# Patient Record
Sex: FEMALE | Race: WHITE | NOT HISPANIC OR LATINO | Employment: OTHER | URBAN - METROPOLITAN AREA
[De-identification: names, ages, dates, MRNs, and addresses within clinical notes are randomized per-mention and may not be internally consistent; named-entity substitution may affect disease eponyms.]

---

## 2017-01-10 ENCOUNTER — HOSPITAL ENCOUNTER (OUTPATIENT)
Dept: RADIOLOGY | Facility: HOSPITAL | Age: 57
Discharge: HOME | End: 2017-01-10
Attending: FAMILY MEDICINE
Payer: COMMERCIAL

## 2017-01-10 ENCOUNTER — OFFICE VISIT (OUTPATIENT)
Dept: FAMILY MEDICINE | Facility: CLINIC | Age: 57
End: 2017-01-10
Payer: COMMERCIAL

## 2017-01-10 VITALS
OXYGEN SATURATION: 97 % | HEART RATE: 79 BPM | RESPIRATION RATE: 18 BRPM | WEIGHT: 247.81 LBS | SYSTOLIC BLOOD PRESSURE: 126 MMHG | DIASTOLIC BLOOD PRESSURE: 78 MMHG | BODY MASS INDEX: 35.48 KG/M2 | HEIGHT: 70 IN

## 2017-01-10 DIAGNOSIS — F32.0 MILD MAJOR DEPRESSION: ICD-10-CM

## 2017-01-10 DIAGNOSIS — Z12.39 SCREENING FOR BREAST CANCER: ICD-10-CM

## 2017-01-10 DIAGNOSIS — M15.9 GENERALIZED OSTEOARTHRITIS: ICD-10-CM

## 2017-01-10 DIAGNOSIS — I10 ESSENTIAL HYPERTENSION: Primary | ICD-10-CM

## 2017-01-10 DIAGNOSIS — E03.9 HYPOTHYROIDISM, UNSPECIFIED TYPE: ICD-10-CM

## 2017-01-10 DIAGNOSIS — Z12.31 VISIT FOR SCREENING MAMMOGRAM: ICD-10-CM

## 2017-01-10 PROCEDURE — 99214 OFFICE O/P EST MOD 30 MIN: CPT | Mod: S$GLB,,, | Performed by: FAMILY MEDICINE

## 2017-01-10 PROCEDURE — 3074F SYST BP LT 130 MM HG: CPT | Mod: S$GLB,,, | Performed by: FAMILY MEDICINE

## 2017-01-10 PROCEDURE — 1159F MED LIST DOCD IN RCRD: CPT | Mod: S$GLB,,, | Performed by: FAMILY MEDICINE

## 2017-01-10 PROCEDURE — 99999 PR PBB SHADOW E&M-EST. PATIENT-LVL III: CPT | Mod: PBBFAC,,, | Performed by: FAMILY MEDICINE

## 2017-01-10 PROCEDURE — 77067 SCR MAMMO BI INCL CAD: CPT | Mod: TC

## 2017-01-10 PROCEDURE — 77067 SCR MAMMO BI INCL CAD: CPT | Mod: 26,,, | Performed by: RADIOLOGY

## 2017-01-10 PROCEDURE — 77063 BREAST TOMOSYNTHESIS BI: CPT | Mod: 26,,, | Performed by: RADIOLOGY

## 2017-01-10 PROCEDURE — 3078F DIAST BP <80 MM HG: CPT | Mod: S$GLB,,, | Performed by: FAMILY MEDICINE

## 2017-01-10 RX ORDER — CELECOXIB 100 MG/1
100 CAPSULE ORAL 2 TIMES DAILY PRN
Qty: 180 CAPSULE | Refills: 1 | Status: SHIPPED | OUTPATIENT
Start: 2017-01-10 | End: 2017-10-23

## 2017-01-10 RX ORDER — ESCITALOPRAM OXALATE 10 MG/1
10 TABLET ORAL DAILY
Qty: 90 TABLET | Refills: 1 | Status: SHIPPED | OUTPATIENT
Start: 2017-01-10 | End: 2017-07-05 | Stop reason: SDUPTHER

## 2017-01-10 NOTE — PROGRESS NOTES
Subjective:       Patient ID: Christin Caruso is a 56 y.o. female.    Chief Complaint: Follow-up (6 month f/u)    HPI Comments: Here for f/u chronic medical issues.  Did not get labs over 6 months ago as planned but would like to get updated.  Would like to go back on lexapro as cybalta made her cry.  Feeling better on old lexapro.    Hypertension   This is a chronic problem. The current episode started more than 1 year ago. The problem is controlled. Pertinent negatives include no chest pain, palpitations or shortness of breath. Past treatments include lifestyle changes. The current treatment provides moderate improvement. There are no compliance problems.      Review of Systems   Constitutional: Negative for chills and fever.   Respiratory: Negative for cough, chest tightness and shortness of breath.    Cardiovascular: Negative for chest pain, palpitations and leg swelling.   Gastrointestinal: Negative for abdominal distention and abdominal pain.   Endocrine: Negative for cold intolerance and heat intolerance.   Skin: Negative for rash and wound.   Psychiatric/Behavioral: Negative for dysphoric mood. The patient is not nervous/anxious.        Objective:      Physical Exam   Constitutional: She is oriented to person, place, and time. She appears well-developed and well-nourished.   HENT:   Head: Normocephalic and atraumatic.   Cardiovascular: Normal rate, regular rhythm and normal heart sounds.    Pulmonary/Chest: Effort normal and breath sounds normal.   Abdominal: Soft. Bowel sounds are normal.   Neurological: She is alert and oriented to person, place, and time.   Skin: Skin is warm and dry.   Psychiatric: She has a normal mood and affect.   Nursing note and vitals reviewed.      Assessment:       1. Essential hypertension    2. Hypothyroidism, unspecified type    3. Mild major depression    4. Generalized osteoarthritis        Plan:       Essential hypertension  -     CBC auto differential; Future; Expected date:  1/10/17  -     Comprehensive metabolic panel; Future; Expected date: 1/10/17  -     Lipid panel; Future; Expected date: 1/10/17  -     TSH; Future; Expected date: 1/10/17  -     Vitamin D; Future; Expected date: 1/10/17    Hypothyroidism, unspecified type  -     CBC auto differential; Future; Expected date: 1/10/17  -     Comprehensive metabolic panel; Future; Expected date: 1/10/17  -     Lipid panel; Future; Expected date: 1/10/17  -     TSH; Future; Expected date: 1/10/17  -     Vitamin D; Future; Expected date: 1/10/17    Mild major depression  -     escitalopram oxalate (LEXAPRO) 10 MG tablet; Take 1 tablet (10 mg total) by mouth once daily.  Dispense: 90 tablet; Refill: 1  -     CBC auto differential; Future; Expected date: 1/10/17  -     Comprehensive metabolic panel; Future; Expected date: 1/10/17  -     Lipid panel; Future; Expected date: 1/10/17  -     TSH; Future; Expected date: 1/10/17  -     Vitamin D; Future; Expected date: 1/10/17    Generalized osteoarthritis  -     CBC auto differential; Future; Expected date: 1/10/17  -     Comprehensive metabolic panel; Future; Expected date: 1/10/17  -     Lipid panel; Future; Expected date: 1/10/17  -     TSH; Future; Expected date: 1/10/17  -     Vitamin D; Future; Expected date: 1/10/17    Other orders  -     celecoxib (CELEBREX) 100 MG capsule; Take 1 capsule (100 mg total) by mouth 2 (two) times daily as needed for Pain.  Dispense: 180 capsule; Refill: 1      Home bp log.  Update labs.    Return in about 6 months (around 7/10/2017), or if symptoms worsen or fail to improve.

## 2017-04-13 RX ORDER — PROGESTERONE 200 MG/1
CAPSULE ORAL
Qty: 45 CAPSULE | Refills: 0 | Status: SHIPPED | OUTPATIENT
Start: 2017-04-13 | End: 2018-09-25

## 2017-05-26 RX ORDER — LEVOTHYROXINE SODIUM 137 UG/1
TABLET ORAL
Qty: 90 TABLET | Refills: 0 | Status: SHIPPED | OUTPATIENT
Start: 2017-05-26 | End: 2017-08-26 | Stop reason: SDUPTHER

## 2017-07-05 DIAGNOSIS — F32.0 MILD MAJOR DEPRESSION: ICD-10-CM

## 2017-07-05 RX ORDER — ESCITALOPRAM OXALATE 10 MG/1
10 TABLET ORAL DAILY
Qty: 90 TABLET | Refills: 0 | Status: SHIPPED | OUTPATIENT
Start: 2017-07-05 | End: 2017-10-06 | Stop reason: SDUPTHER

## 2017-08-29 RX ORDER — LEVOTHYROXINE SODIUM 137 UG/1
TABLET ORAL
Qty: 90 TABLET | Refills: 0 | Status: SHIPPED | OUTPATIENT
Start: 2017-08-29 | End: 2017-12-14 | Stop reason: SDUPTHER

## 2017-09-11 ENCOUNTER — TELEPHONE (OUTPATIENT)
Dept: FAMILY MEDICINE | Facility: CLINIC | Age: 57
End: 2017-09-11

## 2017-10-06 DIAGNOSIS — F32.0 MILD MAJOR DEPRESSION: ICD-10-CM

## 2017-10-06 RX ORDER — ESCITALOPRAM OXALATE 10 MG/1
10 TABLET ORAL DAILY
Qty: 90 TABLET | Refills: 0 | Status: SHIPPED | OUTPATIENT
Start: 2017-10-06 | End: 2017-11-17

## 2017-10-06 NOTE — TELEPHONE ENCOUNTER
Refill Authorization Note     is requesting a refill authorization.    Brief assessment and rational for refill: APPROVE: prr  Name of medication: ESCITALOPRAM 10 MG TABLET  How patient will take medication: t1t po daily   Amount/Quantity of medication ordered: 90d   Medication reconciliation completed: No        Refills Authorized: Yes  If authorized number of refills: 0        Medication Therapy Plan: Not commented on recently.  Will approve for 90d   Comments:

## 2017-10-23 ENCOUNTER — TELEPHONE (OUTPATIENT)
Dept: FAMILY MEDICINE | Facility: CLINIC | Age: 57
End: 2017-10-23

## 2017-10-23 ENCOUNTER — OFFICE VISIT (OUTPATIENT)
Dept: FAMILY MEDICINE | Facility: CLINIC | Age: 57
End: 2017-10-23
Payer: COMMERCIAL

## 2017-10-23 VITALS
HEIGHT: 70 IN | TEMPERATURE: 98 F | OXYGEN SATURATION: 97 % | WEIGHT: 249.31 LBS | DIASTOLIC BLOOD PRESSURE: 82 MMHG | SYSTOLIC BLOOD PRESSURE: 126 MMHG | RESPIRATION RATE: 18 BRPM | HEART RATE: 75 BPM | BODY MASS INDEX: 35.69 KG/M2

## 2017-10-23 DIAGNOSIS — M54.50 LUMBAR PAIN WITH RADIATION DOWN RIGHT LEG: ICD-10-CM

## 2017-10-23 DIAGNOSIS — M47.896 OTHER OSTEOARTHRITIS OF SPINE, LUMBAR REGION: Primary | ICD-10-CM

## 2017-10-23 DIAGNOSIS — M19.041 OSTEOARTHRITIS OF FINGER OF RIGHT HAND: ICD-10-CM

## 2017-10-23 DIAGNOSIS — M79.604 LUMBAR PAIN WITH RADIATION DOWN RIGHT LEG: ICD-10-CM

## 2017-10-23 PROBLEM — M19.042 OSTEOARTHRITIS OF FINGERS OF HANDS, BILATERAL: Status: ACTIVE | Noted: 2017-10-23

## 2017-10-23 PROCEDURE — 99999 PR PBB SHADOW E&M-EST. PATIENT-LVL IV: CPT | Mod: PBBFAC,,, | Performed by: NURSE PRACTITIONER

## 2017-10-23 PROCEDURE — 99213 OFFICE O/P EST LOW 20 MIN: CPT | Mod: S$GLB,,, | Performed by: NURSE PRACTITIONER

## 2017-10-23 RX ORDER — CELECOXIB 100 MG/1
100 CAPSULE ORAL 2 TIMES DAILY
Qty: 60 CAPSULE | Refills: 3 | Status: SHIPPED | OUTPATIENT
Start: 2017-10-23 | End: 2017-10-27

## 2017-10-23 RX ORDER — DOXYCYCLINE 100 MG/1
1 CAPSULE ORAL DAILY
COMMUNITY
Start: 2017-10-16 | End: 2017-12-07 | Stop reason: SDUPTHER

## 2017-10-23 NOTE — TELEPHONE ENCOUNTER
----- Message from Shelli Dennis sent at 10/23/2017 10:21 AM CDT -----  Patient stated [facility] on [address] needs prior authorization for medication: Celebrex/also stated they need to know patient has taken medication:Naproxen/please call to authorize and call patient back at [phone] to confirm.

## 2017-10-23 NOTE — PROGRESS NOTES
"Subjective:       Patient ID: Christin Caruso is a 57 y.o. female.    Chief Complaint: Back Pain  She was last seen in primary care by Dr. Betancourt on 01/10/2017. This is her first time seeing me in the clinic.  HPI   She is here today with complaint of chronic back pain greater than 1 year. Low back pain radiates down right leg to her knee. She is taking Naproxen about once weekly and it "use to give relief but no longer does over the last week". Denies history of back injury or fall.  Vitals:    10/23/17 0935   BP: 126/82   Pulse: 75   Resp: 18   Temp: 98.1 °F (36.7 °C)     Lab Results   Component Value Date    TSH 1.066 01/10/2017     CMP  Sodium   Date Value Ref Range Status   01/10/2017 141 136 - 145 mmol/L Final     Potassium   Date Value Ref Range Status   01/10/2017 4.8 3.5 - 5.1 mmol/L Final     Chloride   Date Value Ref Range Status   01/10/2017 104 95 - 110 mmol/L Final     CO2   Date Value Ref Range Status   01/10/2017 30 (H) 23 - 29 mmol/L Final     Glucose   Date Value Ref Range Status   01/10/2017 102 70 - 110 mg/dL Final     BUN, Bld   Date Value Ref Range Status   01/10/2017 18 6 - 20 mg/dL Final     Creatinine   Date Value Ref Range Status   01/10/2017 0.8 0.5 - 1.4 mg/dL Final     Calcium   Date Value Ref Range Status   01/10/2017 9.4 8.7 - 10.5 mg/dL Final     Total Protein   Date Value Ref Range Status   01/10/2017 7.1 6.0 - 8.4 g/dL Final     Albumin   Date Value Ref Range Status   01/10/2017 4.0 3.5 - 5.2 g/dL Final     Total Bilirubin   Date Value Ref Range Status   01/10/2017 1.0 0.1 - 1.0 mg/dL Final     Comment:     For infants and newborns, interpretation of results should be based  on gestational age, weight and in agreement with clinical  observations.  Premature Infant recommended reference ranges:  Up to 24 hours.............<8.0 mg/dL  Up to 48 hours............<12.0 mg/dL  3-5 days..................<15.0 mg/dL  6-29 days.................<15.0 mg/dL       Alkaline Phosphatase   Date " Value Ref Range Status   01/10/2017 85 55 - 135 U/L Final     AST   Date Value Ref Range Status   01/10/2017 26 10 - 40 U/L Final     ALT   Date Value Ref Range Status   01/10/2017 27 10 - 44 U/L Final     Anion Gap   Date Value Ref Range Status   01/10/2017 7 (L) 8 - 16 mmol/L Final     eGFR if    Date Value Ref Range Status   01/10/2017 >60.0 >60 mL/min/1.73 m^2 Final     eGFR if non    Date Value Ref Range Status   01/10/2017 >60.0 >60 mL/min/1.73 m^2 Final     Comment:     Calculation used to obtain the estimated glomerular filtration  rate (eGFR) is the CKD-EPI equation. Since race is unknown   in our information system, the eGFR values for   -American and Non--American patients are given   for each creatinine result.       Review of Systems    Her last lumbar xray was on 1/18/2016 which revealed facet arthropathy.  She states she had physical therapy on her back about 2 years ago with some significant improvement.   She states she has gain 15-20 pounds since that time. Lying down in bed worsens the pain. She has had a rheumatology work up with Dr. LYNNE Brown prior.  Objective:      Physical Exam   Constitutional: She is oriented to person, place, and time. Vital signs are normal. She appears well-developed and well-nourished. She is active and cooperative.   HENT:   Head: Normocephalic and atraumatic.   Right Ear: Hearing, tympanic membrane, external ear and ear canal normal.   Left Ear: Hearing, tympanic membrane, external ear and ear canal normal.   Nose: Nose normal.   Mouth/Throat: Uvula is midline, oropharynx is clear and moist and mucous membranes are normal.   Eyes: Lids are normal.   Neck: Trachea normal, normal range of motion, full passive range of motion without pain and phonation normal. Neck supple.   Cardiovascular: Normal rate, regular rhythm and normal heart sounds.    Pulmonary/Chest: Effort normal and breath sounds normal.   Abdominal: Soft. There  is no splenomegaly or hepatomegaly. There is no tenderness.   Musculoskeletal: She exhibits tenderness and deformity.        Hands:  Lymphadenopathy:        Head (right side): No submental, no submandibular, no tonsillar, no preauricular, no posterior auricular and no occipital adenopathy present.        Head (left side): No submental, no submandibular, no tonsillar, no preauricular, no posterior auricular and no occipital adenopathy present.     She has no cervical adenopathy.   Neurological: She is alert and oriented to person, place, and time.   Skin: Skin is warm, dry and intact.   Psychiatric: She has a normal mood and affect. Her speech is normal and behavior is normal. Judgment and thought content normal. Cognition and memory are normal.   Nursing note and vitals reviewed.      Assessment & Plan:       Other osteoarthritis of spine, lumbar region  -     celecoxib (CELEBREX) 100 MG capsule; Take 1 capsule (100 mg total) by mouth 2 (two) times daily. Take with food  Dispense: 60 capsule; Refill: 3  -     Ambulatory Referral to Physical/Occupational Therapy    Osteoarthritis of finger of right hand  -     celecoxib (CELEBREX) 100 MG capsule; Take 1 capsule (100 mg total) by mouth 2 (two) times daily. Take with food  Dispense: 60 capsule; Refill: 3  -     Ambulatory Referral to Physical/Occupational Therapy    Lumbar pain with radiation down right leg        Will consider further imagine studies as needed if not improving after physical therapy consult  Return in about 4 weeks (around 11/20/2017), or if symptoms worsen or fail to improve.

## 2017-10-24 NOTE — TELEPHONE ENCOUNTER
----- Message from Gregoria Deshpande sent at 10/24/2017  9:58 AM CDT -----  Contact: pt  Pt states sent a message on yesterday,urgent,a prescription was sent to pharmacy needs a prior authorization,needs to be handled today,in severe pain can't walk,needs prescription filled,please. Rx-(CELEBREX)...[phone]         .  [facility]/pharmacy #[id number] - [place] - [address]  [address] [place] [address]  Phone: [phone] Fax: [phone]

## 2017-10-27 ENCOUNTER — TELEPHONE (OUTPATIENT)
Dept: FAMILY MEDICINE | Facility: CLINIC | Age: 57
End: 2017-10-27

## 2017-10-27 RX ORDER — MELOXICAM 7.5 MG/1
7.5 TABLET ORAL DAILY PRN
Qty: 30 TABLET | Refills: 1 | Status: SHIPPED | OUTPATIENT
Start: 2017-10-27 | End: 2018-04-05

## 2017-10-27 NOTE — TELEPHONE ENCOUNTER
Left message notifying patient that Celebrex had been replaced my Meloxicam as Celebrex is not covered by Insurance. Mobic was sent to [facility], and patient should call pharmacy to .

## 2017-10-27 NOTE — TELEPHONE ENCOUNTER
----- Message from Mirna Luong sent at 10/27/2017  9:44 AM CDT -----  Contact: patient  Patient calling in regards to following up on a prescription for Celebrex that she needs to get approved. She is wanting to speak with the Nurse. Please advise.  Call back   Thanks!    [facility]/pharmacy #[id number] - [place] - [address]  [address]  [place] [address]  Phone: [phone] Fax: [phone]

## 2017-11-03 ENCOUNTER — TELEPHONE (OUTPATIENT)
Dept: FAMILY MEDICINE | Facility: CLINIC | Age: 57
End: 2017-11-03

## 2017-11-03 NOTE — TELEPHONE ENCOUNTER
----- Message from Jonathan Solitario sent at 11/1/2017  2:30 PM CDT -----  Contact: same  Patient called in and requested a message be sent regarding a recommendation that was made for patient to go to physical therapy.   Patient stated that the company [employer] [facility] is out of business and will need another referral.  Patient call back number is [phone]

## 2017-11-03 NOTE — TELEPHONE ENCOUNTER
Spoke with pt advised her to contact insurance and find out who they cover and let us know and we will be glad to send referral

## 2017-11-06 ENCOUNTER — TELEPHONE (OUTPATIENT)
Dept: FAMILY MEDICINE | Facility: CLINIC | Age: 57
End: 2017-11-06

## 2017-11-06 DIAGNOSIS — M54.5 LOW BACK PAIN, UNSPECIFIED BACK PAIN LATERALITY, UNSPECIFIED CHRONICITY, WITH SCIATICA PRESENCE UNSPECIFIED: Primary | ICD-10-CM

## 2017-11-06 NOTE — TELEPHONE ENCOUNTER
----- Message from Sofi Duarte sent at 11/6/2017  9:39 AM CST -----  Contact: self  Patient [phone] is calling/the physical therapist - [facility] - is out of business and patient/patient is wanting to go to [facility] - [phone] - for her hands and back/please call patient

## 2017-11-07 ENCOUNTER — TELEPHONE (OUTPATIENT)
Dept: FAMILY MEDICINE | Facility: CLINIC | Age: 57
End: 2017-11-07

## 2017-11-07 NOTE — TELEPHONE ENCOUNTER
----- Message from Tasha Whitman sent at 11/7/2017  7:58 AM CST -----  Contact: self  Patient is calling back regarding her physical therapy. She is upset due to not getting a return call. Patient states the Physical Therapy [facility], [facility] will not make her an appointment until the prescription for her hands and back is faxed to them. Please call [employer] for the fax number at  [phone]. Please call patient at [phone] when prescription is sent. Thanks!

## 2017-11-07 NOTE — TELEPHONE ENCOUNTER
----- Message from RT Yanique sent at 11/7/2017  8:37 AM CST -----  Contact: pt    pt , requesting to provide fax number to [facility] which is , please re fax PT order soon to the number provided, thanks.

## 2017-11-16 ENCOUNTER — TELEPHONE (OUTPATIENT)
Dept: INTERNAL MEDICINE | Facility: CLINIC | Age: 57
End: 2017-11-16

## 2017-11-16 DIAGNOSIS — M62.08 RECTUS DIASTASIS: Primary | ICD-10-CM

## 2017-11-16 NOTE — TELEPHONE ENCOUNTER
Cancelled apt with Dr Hahn for today ,we will not be able to take care of the patient for this she needs to see a specialist for hernia repair and pended referral  Please advise ty

## 2017-11-17 ENCOUNTER — TELEPHONE (OUTPATIENT)
Dept: FAMILY MEDICINE | Facility: CLINIC | Age: 57
End: 2017-11-17

## 2017-11-17 ENCOUNTER — OFFICE VISIT (OUTPATIENT)
Dept: FAMILY MEDICINE | Facility: CLINIC | Age: 57
End: 2017-11-17
Payer: COMMERCIAL

## 2017-11-17 VITALS
DIASTOLIC BLOOD PRESSURE: 86 MMHG | BODY MASS INDEX: 36.23 KG/M2 | SYSTOLIC BLOOD PRESSURE: 128 MMHG | HEIGHT: 70 IN | WEIGHT: 253.06 LBS | RESPIRATION RATE: 16 BRPM | HEART RATE: 88 BPM

## 2017-11-17 DIAGNOSIS — K43.9 VENTRAL HERNIA WITHOUT OBSTRUCTION OR GANGRENE: Primary | ICD-10-CM

## 2017-11-17 PROCEDURE — 99213 OFFICE O/P EST LOW 20 MIN: CPT | Mod: S$GLB,,, | Performed by: FAMILY MEDICINE

## 2017-11-17 PROCEDURE — 99999 PR PBB SHADOW E&M-EST. PATIENT-LVL III: CPT | Mod: PBBFAC,,, | Performed by: FAMILY MEDICINE

## 2017-11-17 NOTE — TELEPHONE ENCOUNTER
----- Message from Gregoria Deshpande sent at 11/17/2017  8:02 AM CST -----  Contact: pt  Pt would like nurse Read to know she is looking for the referral for a Hernia Specialist.....[phone]

## 2017-11-17 NOTE — TELEPHONE ENCOUNTER
----- Message from Jonathan Solitario sent at 11/16/2017  4:21 PM CST -----  Contact: same  Patient called in and wanted to check the status of the specialist name for her hernia surgery.  Patient call back number is [phone]

## 2017-11-17 NOTE — PROGRESS NOTES
Subjective:       Patient ID: Christin Caruso is a 57 y.o. female.    Chief Complaint: Abdominal Pain (Patient here due to abdominal pain. ) and Hernia (Patient here due to abdomina hernia)    Here requesting surgery referral for abd hernia.      Review of Systems   Constitutional: Negative for chills, fatigue and fever.   Respiratory: Negative for cough, chest tightness and shortness of breath.    Cardiovascular: Negative for chest pain, palpitations and leg swelling.   Gastrointestinal: Negative for abdominal distention and abdominal pain.   Endocrine: Negative for cold intolerance and heat intolerance.   Skin: Negative for rash.       Objective:      Physical Exam   Constitutional: She appears well-developed and well-nourished.   HENT:   Head: Normocephalic and atraumatic.   Cardiovascular: Normal rate, regular rhythm and normal heart sounds.    Pulmonary/Chest: Effort normal and breath sounds normal.   Abdominal: Soft. Bowel sounds are normal. She exhibits no distension and no mass. There is no tenderness. There is no rebound and no guarding. A hernia is present.   Psychiatric: She has a normal mood and affect.   Nursing note and vitals reviewed.      Assessment:       1. Ventral hernia without obstruction or gangrene        Plan:       Ventral hernia without obstruction or gangrene  -     Ambulatory referral to General Surgery      Refer to surgery per pt request.  Vs diastasis recti  Return if symptoms worsen or fail to improve.

## 2017-11-17 NOTE — TELEPHONE ENCOUNTER
Left message for patient to call clinic, Please let patient know Dr Betancourt wants her to see Vera on Tuesday and she can do referral if necessary.

## 2017-11-17 NOTE — TELEPHONE ENCOUNTER
----- Message from Evette Fournier sent at 11/17/2017  3:34 PM CST -----  Contact: self  Patient called regarding visit today regarding hernia. What can she do to relieve pain before next appointment? Wanted to know about a girdle she can possibly wear to assist. Please contact [phone]

## 2017-11-17 NOTE — TELEPHONE ENCOUNTER
Spoke with pt and advised her to go to a medical supply store and request an abdominal binder to support her hernia. Pt verbalized understanding

## 2017-11-20 ENCOUNTER — TELEPHONE (OUTPATIENT)
Dept: SURGERY | Facility: CLINIC | Age: 57
End: 2017-11-20

## 2017-11-20 NOTE — TELEPHONE ENCOUNTER
----- Message from Ashlyn Youngkay sent at 11/20/2017  8:16 AM CST -----  Contact: Self  Patient has an hernia and would like to have the surgery before the end of the year because of her insurance.  She has an appointment on 11/30 and is on the wait list.  She was referred by Dr Betancourt.  Thanks

## 2017-11-20 NOTE — TELEPHONE ENCOUNTER
I called patient to inform her that Dr. Castellon is out of town this week.  She has an appointment on Thursday, 11/30/17 at 2:45pm for a ventral hernia.  She wants to have her surgery before the end of the year, which I informed her that she can discuss it with Dr. Castellon at her appointment.  Ross

## 2017-11-30 ENCOUNTER — OFFICE VISIT (OUTPATIENT)
Dept: SURGERY | Facility: CLINIC | Age: 57
End: 2017-11-30
Payer: COMMERCIAL

## 2017-11-30 VITALS
DIASTOLIC BLOOD PRESSURE: 85 MMHG | WEIGHT: 250.19 LBS | SYSTOLIC BLOOD PRESSURE: 135 MMHG | BODY MASS INDEX: 35.82 KG/M2 | HEART RATE: 84 BPM | HEIGHT: 70 IN | TEMPERATURE: 98 F

## 2017-11-30 DIAGNOSIS — R10.9 ABDOMINAL PAIN, UNSPECIFIED ABDOMINAL LOCATION: Primary | ICD-10-CM

## 2017-11-30 PROCEDURE — 99999 PR PBB SHADOW E&M-EST. PATIENT-LVL III: CPT | Mod: PBBFAC,,, | Performed by: SURGERY

## 2017-11-30 PROCEDURE — 99243 OFF/OP CNSLTJ NEW/EST LOW 30: CPT | Mod: S$GLB,,, | Performed by: SURGERY

## 2017-11-30 RX ORDER — CHOLECALCIFEROL (VITAMIN D3) 25 MCG
2000 TABLET ORAL DAILY
COMMUNITY

## 2017-11-30 NOTE — LETTER
November 30, 2017      NINOSKA Betancourt MD  [address] [facility] [address] [place] [address]           [facility]  [address] [facility] [address] [place] [address]  Phone: [phone]          Patient: Christin Caruso   MR Number: [id number]   YOB: 1960   Date of Visit: 11/30/2017       Dear Dr. NINOSKA Betancourt:    Thank you for referring Christin Caruso to me for evaluation. Attached you will find relevant portions of my assessment and plan of care.    If you have questions, please do not hesitate to call me. I look forward to following Christin Caruso along with you.    Sincerely,    Silver Emerson LPN    Enclosure  CC:  No Recipients    If you would like to receive this communication electronically, please contact [email or web address] or [phone] to request more information on [employer] Link access.    For providers and/or their staff who would like to refer a patient to [facility], please contact us through our one-stop-shop provider referral line, [facility], at [phone].    If you feel you have received this communication in error or would no longer like to receive these types of communications, please e-mail [email or web address]

## 2017-11-30 NOTE — PROGRESS NOTES
Subjective:       Patient ID: Christin Caruso is a 57 y.o. female.    Chief Complaint: Consult (Ventral Hernia)    HPI Pleasant 56 yo F referred to me for evaluation of suspected hernia.  Pt notes that she has noticed a bulge in her upper abdomen everytime she leans forward or strains her abdominal wall.  She denies any pain in the area of the bulge.  She has had some occasional R LQ pain.  Pt did have a normal cscope earlier this year.  No n/v.  No feverchills.    Review of Systems   Constitutional: Negative for activity change, appetite change, fever and unexpected weight change.   Respiratory: Negative for chest tightness, shortness of breath and wheezing.    Cardiovascular: Negative for chest pain.   Gastrointestinal: Negative for abdominal distention, abdominal pain, anal bleeding, blood in stool, constipation, diarrhea, nausea, rectal pain and vomiting.   Genitourinary: Negative for difficulty urinating, dysuria and frequency.   Skin: Negative for wound.   Neurological: Negative for dizziness.   Hematological: Negative for adenopathy.   Psychiatric/Behavioral: Negative for agitation.       Objective:      Physical Exam   Constitutional: She is oriented to person, place, and time. She appears well-developed and well-nourished.   HENT:   Head: Normocephalic and atraumatic.   Eyes: Pupils are equal, round, and reactive to light.   Neck: Normal range of motion. Neck supple. No tracheal deviation present. No thyromegaly present.   Cardiovascular: Normal rate, regular rhythm and normal heart sounds.    No murmur heard.  Pulmonary/Chest: Effort normal and breath sounds normal. She exhibits no tenderness.   Abdominal: Soft. Bowel sounds are normal. She exhibits no distension, no abdominal bruit, no pulsatile midline mass and no mass. There is no hepatosplenomegaly. There is no tenderness. There is no rigidity, no rebound, no guarding, no tenderness at McBurney's point and negative Montelongo's sign. No hernia. Hernia  confirmed negative in the ventral area.       Genitourinary: Rectum normal.   Musculoskeletal: Normal range of motion.   Neurological: She is alert and oriented to person, place, and time.   Skin: Skin is warm. No rash noted. No erythema.   Psychiatric: She has a normal mood and affect.   Vitals reviewed.      Assessment:       1. Abdominal pain, unspecified abdominal location        Plan:       D/w pt.  I have informed pt that she has rectus diastasis.  I have informed pt that there is no definite surgery needed for this procedure.  If desired could consider plication with plastics surgery.  Pt expressed understanding and will f/u with me prn

## 2017-12-01 ENCOUNTER — TELEPHONE (OUTPATIENT)
Dept: SURGERY | Facility: CLINIC | Age: 57
End: 2017-12-01

## 2017-12-01 NOTE — TELEPHONE ENCOUNTER
----- Message from Jonna Aranda sent at 12/1/2017  2:17 PM CST -----  Contact: [phone]  Patient is requesting a call back from the nurse, stated she have history of kidney issues, her mother had kidney stones and kidney cancer.  Please call the patient upon request at phone number [phone].

## 2017-12-04 ENCOUNTER — HOSPITAL ENCOUNTER (OUTPATIENT)
Dept: RADIOLOGY | Facility: HOSPITAL | Age: 57
Discharge: HOME | End: 2017-12-04
Attending: SURGERY
Payer: COMMERCIAL

## 2017-12-04 DIAGNOSIS — R10.9 ABDOMINAL PAIN, UNSPECIFIED ABDOMINAL LOCATION: ICD-10-CM

## 2017-12-04 PROCEDURE — 25500020 PHARM REV CODE 255: Mod: PO | Performed by: SURGERY

## 2017-12-04 PROCEDURE — 74178 CT ABD&PLV WO CNTR FLWD CNTR: CPT | Mod: TC,PO

## 2017-12-04 PROCEDURE — 74178 CT ABD&PLV WO CNTR FLWD CNTR: CPT | Mod: 26,,, | Performed by: RADIOLOGY

## 2017-12-04 RX ADMIN — IOHEXOL 30 ML: 350 INJECTION, SOLUTION INTRAVENOUS at 10:12

## 2017-12-04 RX ADMIN — IOHEXOL 100 ML: 350 INJECTION, SOLUTION INTRAVENOUS at 10:12

## 2017-12-05 ENCOUNTER — TELEPHONE (OUTPATIENT)
Dept: SURGERY | Facility: CLINIC | Age: 57
End: 2017-12-05

## 2017-12-05 NOTE — TELEPHONE ENCOUNTER
Called to review results with pt.  Findings of renal stone in distal ureter with mild hydronephrosis.  Recommend f/u with urologist and me prn

## 2017-12-06 ENCOUNTER — TELEPHONE (OUTPATIENT)
Dept: SURGERY | Facility: CLINIC | Age: 57
End: 2017-12-06

## 2017-12-06 NOTE — TELEPHONE ENCOUNTER
Patient informed of her appointment with Dr. Jeffers on Thursday, 12/7/17 at 2:15pm in [place].  Ross

## 2017-12-06 NOTE — TELEPHONE ENCOUNTER
----- Message from Sofi Da Silva sent at 12/6/2017 12:33 PM CST -----  Contact: Christin  Patient is calling to check on referral and name of urologist. Please call [phone]. Thanks!

## 2017-12-07 ENCOUNTER — INITIAL CONSULT (OUTPATIENT)
Dept: UROLOGY | Facility: CLINIC | Age: 57
End: 2017-12-07
Payer: COMMERCIAL

## 2017-12-07 VITALS
HEART RATE: 76 BPM | WEIGHT: 251.31 LBS | BODY MASS INDEX: 35.98 KG/M2 | SYSTOLIC BLOOD PRESSURE: 114 MMHG | HEIGHT: 70 IN | DIASTOLIC BLOOD PRESSURE: 70 MMHG

## 2017-12-07 DIAGNOSIS — N13.30 HYDRONEPHROSIS, UNSPECIFIED HYDRONEPHROSIS TYPE: ICD-10-CM

## 2017-12-07 DIAGNOSIS — R10.9 FLANK PAIN: ICD-10-CM

## 2017-12-07 DIAGNOSIS — N20.1 CALCULUS OF URETER: Primary | ICD-10-CM

## 2017-12-07 DIAGNOSIS — N13.2 HYDRONEPHROSIS WITH OBSTRUCTING CALCULUS: ICD-10-CM

## 2017-12-07 DIAGNOSIS — N20.1 URETERAL STONE: Primary | ICD-10-CM

## 2017-12-07 LAB
BILIRUB SERPL-MCNC: NORMAL MG/DL
BLOOD URINE, POC: NORMAL
COLOR, POC UA: YELLOW
GLUCOSE UR QL STRIP: NORMAL
KETONES UR QL STRIP: NORMAL
LEUKOCYTE ESTERASE URINE, POC: NORMAL
NITRITE, POC UA: NORMAL
PH, POC UA: 5
PROTEIN, POC: NORMAL
SPECIFIC GRAVITY, POC UA: 1
UROBILINOGEN, POC UA: NORMAL

## 2017-12-07 PROCEDURE — 81002 URINALYSIS NONAUTO W/O SCOPE: CPT | Mod: S$GLB,,, | Performed by: UROLOGY

## 2017-12-07 PROCEDURE — 99999 PR PBB SHADOW E&M-EST. PATIENT-LVL III: CPT | Mod: PBBFAC,,, | Performed by: UROLOGY

## 2017-12-07 PROCEDURE — 99204 OFFICE O/P NEW MOD 45 MIN: CPT | Mod: 25,S$GLB,, | Performed by: UROLOGY

## 2017-12-07 RX ORDER — HYDROCODONE BITARTRATE AND ACETAMINOPHEN 5; 325 MG/1; MG/1
1 TABLET ORAL EVERY 6 HOURS PRN
Qty: 20 TABLET | Refills: 0 | Status: SHIPPED | OUTPATIENT
Start: 2017-12-07 | End: 2018-06-26

## 2017-12-07 RX ORDER — DOXYCYCLINE 100 MG/1
100 CAPSULE ORAL DAILY
Qty: 30 CAPSULE | Refills: 0 | Status: SHIPPED | OUTPATIENT
Start: 2017-12-07 | End: 2018-01-16 | Stop reason: SDUPTHER

## 2017-12-07 NOTE — LETTER
December 7, 2017      Emmanuel Castellon MD  [address] [facility] [address] [place] [address]           [facility]  [address] [facility] [place] [address]  Phone: [phone]          Patient: Christin Caruso   MR Number: [id number]   YOB: 1960   Date of Visit: 12/7/2017       Dear Dr. Emmanuel Castellon:    Thank you for referring Christin Caruso to me for evaluation. Attached you will find relevant portions of my assessment and plan of care.    If you have questions, please do not hesitate to call me. I look forward to following Christin Caruso along with you.    Sincerely,    NINOSKA Jeffers MD    Enclosure  CC:  No Recipients    If you would like to receive this communication electronically, please contact [email or web address] or [phone] to request more information on [employer] Link access.    For providers and/or their staff who would like to refer a patient to [facility], please contact us through our one-stop-shop provider referral line, [facility] , at [phone].    If you feel you have received this communication in error or would no longer like to receive these types of communications, please e-mail [email or web address]

## 2017-12-07 NOTE — PROGRESS NOTES
Subjective:       Patient ID: Christin Caruso is a 57 y.o. female.    Chief Complaint: Advice Only    HPI     57 year old with right sided back/flank pain beginning 6-8 weeks ago.  She was being evaluated for ventral hernia and underwent underwent CT scan which demonstrated a small distal right ureteral stone.  She has no history of kidney stone.  I reviewed the images with her.   There is a small 4 mm stone near the right UVJ.  Minimal ureteral dilated an no other stones.  She denies associated nausea.  No gross hematuria and UA is clear today.    Urine dipstick shows negative for all components.    Past Medical History:   Diagnosis Date    Arthritis     Depression     Hypertension     Following with Dr. Craig    MONIKA (obstructive sleep apnea)     Thyroid disease     goiter     Past Surgical History:   Procedure Laterality Date    BUNIONECTOMY      right foot     SECTION      x2    COLONOSCOPY N/A 2016    Procedure: COLONOSCOPY;  Surgeon: Aj Pruitt Jr., MD;  Location: [facility];  Service: Endoscopy;  Laterality: N/A;    TONSILLECTOMY         Current Outpatient Prescriptions:     doxycycline (VIBRAMYCIN) 100 MG Cap, Take 1 capsule (100 mg total) by mouth once daily., Disp: 30 capsule, Rfl: 0    multivitamin (DAILY MULTI-VITAMIN) per tablet, Every day, Disp: , Rfl:     progesterone (PROMETRIUM) 200 MG capsule, TAKE 1 CAPSULE BY MOUTH EVERY FIRST 2 WEEKS OF MONTH., Disp: 45 capsule, Rfl: 0    SYNTHROID 137 mcg Tab tablet, TAKE 1 TABLET BY MOUTH BEFORE BREAKFAST, Disp: 90 tablet, Rfl: 0    vitamin D 1000 units Tab, Take 1,000 Units by mouth once daily., Disp: , Rfl:     epinephrine (EPIPEN) 0.3 mg/0.3 mL (1:1,000) AtIn, Inject 0.3 mLs (0.3 mg total) into the muscle once., Disp: 0.3 mL, Rfl: 3    hydrocodone-acetaminophen 5-325mg (NORCO) 5-325 mg per tablet, Take 1 tablet by mouth every 6 (six) hours as needed for Pain., Disp: 20 tablet, Rfl: 0    meloxicam (MOBIC) 7.5 MG tablet, Take  1 tablet (7.5 mg total) by mouth daily as needed for Pain., Disp: 30 tablet, Rfl: 1    Review of Systems   Constitutional: Negative for fever.   Eyes: Negative for visual disturbance.   Respiratory: Negative for shortness of breath.    Cardiovascular: Negative for chest pain.   Gastrointestinal: Negative for nausea.   Genitourinary: Positive for flank pain. Negative for dysuria and hematuria.   Musculoskeletal: Negative for gait problem.   Skin: Negative for rash.   Neurological: Negative for seizures.   Psychiatric/Behavioral: Negative for confusion.       Objective:      Physical Exam   Constitutional: She is oriented to person, place, and time. She appears well-developed and well-nourished.   HENT:   Head: Normocephalic.   Eyes: Conjunctivae and EOM are normal.   Neck: Normal range of motion.   Cardiovascular: Normal rate.    Pulmonary/Chest: Effort normal.   Abdominal: Soft. She exhibits no distension and no mass. There is no tenderness. There is no CVA tenderness.   Genitourinary:   Genitourinary Comments: Bladder non-tender and nondistended  No CVA tenderness   Musculoskeletal: She exhibits no edema.   Neurological: She is alert and oriented to person, place, and time.   Skin: Skin is warm and dry. No rash noted. No erythema.   Psychiatric: She has a normal mood and affect. Her behavior is normal.   Vitals reviewed.      Assessment:       1. Calculus of ureter    2. Hydronephrosis with obstructing calculus    3. Flank pain        Plan:       Calculus of ureter  -     POCT URINE DIPSTICK WITHOUT MICROSCOPE    Hydronephrosis with obstructing calculus    Flank pain    Other orders  -     doxycycline (VIBRAMYCIN) 100 MG Cap; Take 1 capsule (100 mg total) by mouth once daily.  Dispense: 30 capsule; Refill: 0  -     hydrocodone-acetaminophen 5-325mg (NORCO) 5-325 mg per tablet; Take 1 tablet by mouth every 6 (six) hours as needed for Pain.  Dispense: 20 tablet; Refill: 0      We discussed treatment options.  Longer  trial of passage vs ureteroscopy.  She has elected ureteroscopy.  We discussed procedure including risks.    Recommendations:   Increase hydration 2 liters fluid per day.      Strain Urine     Take pain medications as needed     Call office for severe pain or fever >101

## 2017-12-08 DIAGNOSIS — N20.0 KIDNEY STONE: Primary | ICD-10-CM

## 2017-12-08 DIAGNOSIS — N13.30 HYDRONEPHROSIS, UNSPECIFIED HYDRONEPHROSIS TYPE: ICD-10-CM

## 2017-12-08 NOTE — TELEPHONE ENCOUNTER
----- Message from Tasha Whitman sent at 12/8/2017  2:04 PM CST -----  Contact: self  Patient called asking if she would be a good candidate for Flomax. Patient prefers not to have surgery.  Please call patient at [phone]. Thanks!

## 2017-12-08 NOTE — TELEPHONE ENCOUNTER
Pt would like to know if she can be prescribed flomax to possibly pass this kidney stone on her own. Please advise.

## 2017-12-11 ENCOUNTER — ANESTHESIA EVENT (OUTPATIENT)
Dept: SURGERY | Facility: HOSPITAL | Age: 57
End: 2017-12-11
Payer: COMMERCIAL

## 2017-12-11 ENCOUNTER — PATIENT MESSAGE (OUTPATIENT)
Dept: SURGERY | Facility: HOSPITAL | Age: 57
End: 2017-12-11

## 2017-12-11 ENCOUNTER — TELEPHONE (OUTPATIENT)
Dept: UROLOGY | Facility: CLINIC | Age: 57
End: 2017-12-11

## 2017-12-11 RX ORDER — TAMSULOSIN HYDROCHLORIDE 0.4 MG/1
0.4 CAPSULE ORAL DAILY
Qty: 30 CAPSULE | Refills: 0 | Status: SHIPPED | OUTPATIENT
Start: 2017-12-11 | End: 2018-01-13 | Stop reason: SDUPTHER

## 2017-12-11 NOTE — TELEPHONE ENCOUNTER
----- Message from Malinda Jones sent at 12/11/2017  3:14 PM CST -----  Contact: self [phone]  She wants to know if she can take flomax even though she is having the cysto tomorrow.  Please call her.  Thank you!

## 2017-12-12 ENCOUNTER — HOSPITAL ENCOUNTER (OUTPATIENT)
Dept: RADIOLOGY | Facility: HOSPITAL | Age: 57
Discharge: HOME | End: 2017-12-12
Attending: UROLOGY | Admitting: UROLOGY
Payer: COMMERCIAL

## 2017-12-12 ENCOUNTER — ANESTHESIA (OUTPATIENT)
Dept: SURGERY | Facility: HOSPITAL | Age: 57
End: 2017-12-12
Payer: COMMERCIAL

## 2017-12-12 ENCOUNTER — HOSPITAL ENCOUNTER (OUTPATIENT)
Facility: HOSPITAL | Age: 57
Discharge: HOME | End: 2017-12-12
Attending: UROLOGY | Admitting: UROLOGY
Payer: COMMERCIAL

## 2017-12-12 VITALS
BODY MASS INDEX: 35.07 KG/M2 | TEMPERATURE: 98 F | DIASTOLIC BLOOD PRESSURE: 78 MMHG | HEIGHT: 70 IN | WEIGHT: 245 LBS | SYSTOLIC BLOOD PRESSURE: 148 MMHG | RESPIRATION RATE: 20 BRPM | HEART RATE: 70 BPM

## 2017-12-12 DIAGNOSIS — N20.1 URETERAL STONE: Primary | ICD-10-CM

## 2017-12-12 DIAGNOSIS — N20.1 RIGHT URETERAL STONE: ICD-10-CM

## 2017-12-12 DIAGNOSIS — N20.1 URETERAL STONE: ICD-10-CM

## 2017-12-12 PROCEDURE — 27200651 HC AIRWAY, LMA: Mod: PO | Performed by: NURSE ANESTHETIST, CERTIFIED REGISTERED

## 2017-12-12 PROCEDURE — 76000 FLUOROSCOPY <1 HR PHYS/QHP: CPT | Mod: TC,PO

## 2017-12-12 PROCEDURE — 37000009 HC ANESTHESIA EA ADD 15 MINS: Mod: PO | Performed by: UROLOGY

## 2017-12-12 PROCEDURE — C1758 CATHETER, URETERAL: HCPCS | Mod: PO | Performed by: UROLOGY

## 2017-12-12 PROCEDURE — 71000039 HC RECOVERY, EACH ADD'L HOUR: Mod: PO | Performed by: UROLOGY

## 2017-12-12 PROCEDURE — 36000707: Mod: PO | Performed by: UROLOGY

## 2017-12-12 PROCEDURE — 71000033 HC RECOVERY, INTIAL HOUR: Mod: PO | Performed by: UROLOGY

## 2017-12-12 PROCEDURE — 63600175 PHARM REV CODE 636 W HCPCS: Mod: PO | Performed by: NURSE ANESTHETIST, CERTIFIED REGISTERED

## 2017-12-12 PROCEDURE — 37000008 HC ANESTHESIA 1ST 15 MINUTES: Mod: PO | Performed by: UROLOGY

## 2017-12-12 PROCEDURE — D9220A PRA ANESTHESIA: Mod: ANES,,, | Performed by: ANESTHESIOLOGY

## 2017-12-12 PROCEDURE — D9220A PRA ANESTHESIA: Mod: CRNA,,, | Performed by: NURSE ANESTHETIST, CERTIFIED REGISTERED

## 2017-12-12 PROCEDURE — C1769 GUIDE WIRE: HCPCS | Mod: PO | Performed by: UROLOGY

## 2017-12-12 PROCEDURE — 52351 CYSTOURETERO & OR PYELOSCOPE: CPT | Mod: RT,,, | Performed by: UROLOGY

## 2017-12-12 PROCEDURE — 63600175 PHARM REV CODE 636 W HCPCS: Mod: PO | Performed by: ANESTHESIOLOGY

## 2017-12-12 PROCEDURE — 74420 UROGRAPHY RTRGR +-KUB: CPT | Mod: 26,,, | Performed by: UROLOGY

## 2017-12-12 PROCEDURE — 36000706: Mod: PO | Performed by: UROLOGY

## 2017-12-12 PROCEDURE — 25500020 PHARM REV CODE 255: Mod: PO | Performed by: UROLOGY

## 2017-12-12 RX ORDER — LIDOCAINE HCL/PF 100 MG/5ML
SYRINGE (ML) INTRAVENOUS
Status: DISCONTINUED | OUTPATIENT
Start: 2017-12-12 | End: 2017-12-12

## 2017-12-12 RX ORDER — SODIUM CHLORIDE, SODIUM LACTATE, POTASSIUM CHLORIDE, CALCIUM CHLORIDE 600; 310; 30; 20 MG/100ML; MG/100ML; MG/100ML; MG/100ML
INJECTION, SOLUTION INTRAVENOUS CONTINUOUS
Status: DISCONTINUED | OUTPATIENT
Start: 2017-12-12 | End: 2017-12-12 | Stop reason: HOSPADM

## 2017-12-12 RX ORDER — ONDANSETRON 2 MG/ML
4 INJECTION INTRAMUSCULAR; INTRAVENOUS DAILY PRN
Status: DISCONTINUED | OUTPATIENT
Start: 2017-12-12 | End: 2017-12-12 | Stop reason: HOSPADM

## 2017-12-12 RX ORDER — SODIUM CHLORIDE 9 MG/ML
INJECTION, SOLUTION INTRAVENOUS CONTINUOUS
Status: DISCONTINUED | OUTPATIENT
Start: 2017-12-12 | End: 2017-12-12 | Stop reason: HOSPADM

## 2017-12-12 RX ORDER — LIDOCAINE HYDROCHLORIDE 10 MG/ML
1 INJECTION, SOLUTION EPIDURAL; INFILTRATION; INTRACAUDAL; PERINEURAL ONCE
Status: DISCONTINUED | OUTPATIENT
Start: 2017-12-12 | End: 2017-12-12 | Stop reason: HOSPADM

## 2017-12-12 RX ORDER — FENTANYL CITRATE 50 UG/ML
INJECTION, SOLUTION INTRAMUSCULAR; INTRAVENOUS
Status: DISCONTINUED | OUTPATIENT
Start: 2017-12-12 | End: 2017-12-12

## 2017-12-12 RX ORDER — PROPOFOL 10 MG/ML
VIAL (ML) INTRAVENOUS
Status: DISCONTINUED | OUTPATIENT
Start: 2017-12-12 | End: 2017-12-12

## 2017-12-12 RX ORDER — LEVOFLOXACIN 5 MG/ML
500 INJECTION, SOLUTION INTRAVENOUS
Status: DISCONTINUED | OUTPATIENT
Start: 2017-12-12 | End: 2017-12-12

## 2017-12-12 RX ORDER — OXYCODONE HYDROCHLORIDE 5 MG/1
5 TABLET ORAL
Status: DISCONTINUED | OUTPATIENT
Start: 2017-12-12 | End: 2017-12-12 | Stop reason: HOSPADM

## 2017-12-12 RX ORDER — MIDAZOLAM HYDROCHLORIDE 1 MG/ML
INJECTION, SOLUTION INTRAMUSCULAR; INTRAVENOUS
Status: DISCONTINUED | OUTPATIENT
Start: 2017-12-12 | End: 2017-12-12

## 2017-12-12 RX ADMIN — PROPOFOL 200 MG: 10 INJECTION, EMULSION INTRAVENOUS at 09:12

## 2017-12-12 RX ADMIN — LIDOCAINE HYDROCHLORIDE 100 MG: 20 INJECTION PARENTERAL at 09:12

## 2017-12-12 RX ADMIN — MIDAZOLAM HYDROCHLORIDE 2 MG: 1 INJECTION, SOLUTION INTRAMUSCULAR; INTRAVENOUS at 09:12

## 2017-12-12 RX ADMIN — ONDANSETRON 4 MG: 2 INJECTION, SOLUTION INTRAMUSCULAR; INTRAVENOUS at 09:12

## 2017-12-12 RX ADMIN — FENTANYL CITRATE 50 MCG: 50 INJECTION, SOLUTION INTRAMUSCULAR; INTRAVENOUS at 09:12

## 2017-12-12 RX ADMIN — SODIUM CHLORIDE, SODIUM LACTATE, POTASSIUM CHLORIDE, CALCIUM CHLORIDE: 600; 310; 30; 20 INJECTION, SOLUTION INTRAVENOUS at 08:12

## 2017-12-12 NOTE — ANESTHESIA POSTPROCEDURE EVALUATION
"Anesthesia Post Evaluation    Patient: Christin Caruso    Procedure(s) Performed: Procedure(s) (LRB):  CYSTOSCOPY WITH RETROGRADE PYELOGRAM (Right)  URETEROSCOPY (Right)  CYSTOSCOPY WITH STENT PLACEMENT (Right)    Final Anesthesia Type: general  Patient location during evaluation: PACU  Patient participation: Yes- Able to Participate  Level of consciousness: awake and alert and oriented  Post-procedure vital signs: reviewed and stable  Pain management: adequate  Airway patency: patent  PONV status at discharge: No PONV  Anesthetic complications: no      Cardiovascular status: hemodynamically stable  Respiratory status: unassisted, spontaneous ventilation and room air  Hydration status: euvolemic  Follow-up not needed.        Visit Vitals  BP (!) 148/78 (BP Location: Left arm)   Pulse 70   Temp 36.4 °C (97.5 °F) (Skin)   Resp 20   Ht 5' 10" (1.778 m)   Wt 111.1 kg (245 lb)   BMI 35.15 kg/m²       Pain/Nimisha Score: Pain Assessment Performed: Yes (12/12/2017 10:17 AM)  Presence of Pain: denies (12/12/2017 10:17 AM)  Nimisha Score: 9 (12/12/2017  9:48 AM)      "

## 2017-12-12 NOTE — TRANSFER OF CARE
"Anesthesia Transfer of Care Note    Patient: Christin Caruso    Procedure(s) Performed: Procedure(s) (LRB):  CYSTOSCOPY WITH RETROGRADE PYELOGRAM (Right)  URETEROSCOPY (Right)  CYSTOSCOPY WITH STENT PLACEMENT (Right)    Patient location: PACU    Anesthesia Type: general    Transport from OR: Transported from OR on room air with adequate spontaneous ventilation    Post pain: adequate analgesia    Post assessment: no apparent anesthetic complications    Post vital signs: stable    Level of consciousness: awake, alert and oriented    Nausea/Vomiting: no nausea/vomiting    Complications: none    Transfer of care protocol was followed      Last vitals:   Visit Vitals  /74   Pulse 67   Temp 36.4 °C (97.5 °F) (Skin)   Resp 20   Ht 5' 10" (1.778 m)   Wt 111.1 kg (245 lb)   BMI 35.15 kg/m²     "

## 2017-12-12 NOTE — PROGRESS NOTES
Patient c/o chest pressure 4/10, denies SOB or arm or jaw pain. Anesthesia notified, [facility] to do a 12 lead.

## 2017-12-12 NOTE — PROGRESS NOTES
Dr. MARTINEZ at bedside to read EKG, normal EKG patient denies pain or SOB and got up to bathroom and belched. Patient an family verbalized understanding.

## 2017-12-12 NOTE — ANESTHESIA PREPROCEDURE EVALUATION
12/12/2017  Christin Caruso is a 57 y.o., female.    Pre-op Assessment    I have reviewed the Patient Summary Reports.     I have reviewed the Nursing Notes.   I have reviewed the Medications.     Review of Systems  Anesthesia Hx:  No problems with previous Anesthesia  Denies Family Hx of Anesthesia complications.   Denies Personal Hx of Anesthesia complications.   Social:  Former smoker   Cardiovascular:   Exercise tolerance: good Hypertension Denies MI.  Denies CAD.     Denies Angina.    Pulmonary:   Denies Shortness of breath. Sleep Apnea, CPAP Compliant with CPAP: 13 qpm  No daytime fatigue   Hepatic/GI:  Hepatic/GI Normal    Musculoskeletal:   Arthritis     Endocrine:   Hypothyroidism    Psych:   depression          Physical Exam  General:  Obesity    Airway/Jaw/Neck:  Airway Findings: Mouth Opening: Normal Tongue: Normal  Mallampati: III  Improves to II with phonation.  TM Distance: 4 - 6 cm  Jaw/Neck Findings:  Neck ROM: Normal ROM  Neck Findings:  Girth Increased, Short Neck       Chest/Lungs:  Chest/Lungs Findings: Clear to auscultation, Normal Respiratory Rate     Heart/Vascular:  Heart Findings: Rate: Normal  Rhythm: Regular Rhythm  Sounds: Normal        Mental Status:  Mental Status Findings:  Cooperative, Alert and Oriented         Anesthesia Plan  Type of Anesthesia, risks & benefits discussed:  Anesthesia Type:  general  Patient's Preference:   Intra-op Monitoring Plan: standard ASA monitors  Intra-op Monitoring Plan Comments:   Post Op Pain Control Plan: multimodal analgesia  Post Op Pain Control Plan Comments:   Induction:   IV  Beta Blocker:  Patient is not currently on a Beta-Blocker (No further documentation required).       Informed Consent: Patient understands risks and agrees with Anesthesia plan.  Questions answered. Anesthesia consent signed with patient.  ASA Score: 3     Day of  Surgery Review of History & Physical: I have interviewed and examined the patient. I have reviewed the patient's H&P dated:  There are no significant changes.      Anesthesia Plan Notes: gen pablo lma        Ready For Surgery From Anesthesia Perspective.

## 2017-12-12 NOTE — DISCHARGE SUMMARY
[facility]  Discharge Note  Short Stay    Admit Date: 12/12/2017    Discharge Date and Time: No discharge date for patient encounter.     Attending Physician: NINOSKA Jeffers MD     Discharge Provider: ZULLY Jeffers    Diagnoses:  Active Hospital Problems    Diagnosis  POA    Right ureteral stone [N20.1]  Yes      Resolved Hospital Problems    Diagnosis Date Resolved POA   No resolved problems to display.       Discharged Condition: good    Hospital Course: Patient was admitted for an outpatient procedure and tolerated the procedure well with no complications.    Final Diagnoses: Same as principal problem.    Disposition: Home or Self Care    Follow up/Patient Instructions:    Medications:  Reconciled Home Medications:   Current Discharge Medication List      CONTINUE these medications which have NOT CHANGED    Details   meloxicam (MOBIC) 7.5 MG tablet Take 1 tablet (7.5 mg total) by mouth daily as needed for Pain.  Qty: 30 tablet, Refills: 1      multivitamin (DAILY MULTI-VITAMIN) per tablet Every day      progesterone (PROMETRIUM) 200 MG capsule TAKE 1 CAPSULE BY MOUTH EVERY FIRST 2 WEEKS OF MONTH.  Qty: 45 capsule, Refills: 0      SYNTHROID 137 mcg Tab tablet TAKE 1 TABLET BY MOUTH BEFORE BREAKFAST  Qty: 90 tablet, Refills: 0      vitamin D 1000 units Tab Take 1,000 Units by mouth once daily.      doxycycline (VIBRAMYCIN) 100 MG Cap Take 1 capsule (100 mg total) by mouth once daily.  Qty: 30 capsule, Refills: 0      epinephrine (EPIPEN) 0.3 mg/0.3 mL (1:1,000) AtIn Inject 0.3 mLs (0.3 mg total) into the muscle once.  Qty: 0.3 mL, Refills: 3    Associated Diagnoses: Allergic reaction      hydrocodone-acetaminophen 5-325mg (NORCO) 5-325 mg per tablet Take 1 tablet by mouth every 6 (six) hours as needed for Pain.  Qty: 20 tablet, Refills: 0      tamsulosin (FLOMAX) 0.4 mg Cp24 Take 1 capsule (0.4 mg total) by mouth once daily.  Qty: 30 capsule, Refills: 0    Associated Diagnoses: Kidney stone;  Hydronephrosis, unspecified hydronephrosis type             Discharge Procedure Orders  Diet general     Activity as tolerated   Scheduling Instructions: Keep hydrated  Follow up prn     Call MD for:  temperature >100.4     Call MD for:  persistent nausea and vomiting or diarrhea     Call MD for:  severe uncontrolled pain           Discharge Procedure Orders (must include Diet, Follow-up, Activity):    Discharge Procedure Orders (must include Diet, Follow-up, Activity)  Diet general     Activity as tolerated   Scheduling Instructions: Keep hydrated  Follow up prn     Call MD for:  temperature >100.4     Call MD for:  persistent nausea and vomiting or diarrhea     Call MD for:  severe uncontrolled pain

## 2017-12-12 NOTE — PLAN OF CARE
Vital signs stable. All questions answered. Denies recent fever or illness. Patient states ready for planned procedure. to bedside.

## 2017-12-12 NOTE — OP NOTE
[facility]  Surgery Department  Operative Note    SUMMARY     Date of Procedure: 12/12/2017     Procedure: Procedure(s) (LRB):  CYSTOSCOPY WITH RETROGRADE PYELOGRAM (Right)  URETEROSCOPY (Right)  CYSTOSCOPY WITH STENT PLACEMENT (Right)     Surgeon(s) and Role:     * NINOSKA Jeffers MD - Primary    Assisting Surgeon: None    Pre-Operative Diagnosis: Ureteral stone [N20.1]  Hydronephrosis, unspecified hydronephrosis type [N13.30]    Post-Operative Diagnosis: Post-Op Diagnosis Codes:     * Ureteral stone [N20.1]     * Hydronephrosis, unspecified hydronephrosis type [N13.30]    Anesthesia: General    Technical Procedures Used: endoscopy    Description of the Findings of the Procedure: No stone seen.    Significant Surgical Tasks Conducted by the Assistant(s), if Applicable: n/a    Complications: No    Estimated Blood Loss (EBL): * No values recorded between 12/12/2017  9:28 AM and 12/12/2017  9:38 AM *           Implants: * No implants in log *    Specimens:   Specimen (12h ago through future)    None                  Condition: Stable    Disposition: PACU - hemodynamically stable.    Attestation: I performed the procedure.

## 2017-12-12 NOTE — INTERVAL H&P NOTE
The patient has been examined and the H&P has been reviewed:    I concur with the findings and no changes have occurred since H&P was written.    Anesthesia/Surgery risks, benefits and alternative options discussed and understood by patient/family.          Active Hospital Problems    Diagnosis  POA    Right ureteral stone [N20.1]  Yes      Resolved Hospital Problems    Diagnosis Date Resolved POA   No resolved problems to display.

## 2017-12-12 NOTE — OP NOTE
DATE OF PROCEDURE:  12/12/2017    PREOPERATIVE DIAGNOSIS:  Right ureteral stone.    POSTOPERATIVE DIAGNOSIS:  Passed stone.    OPERATIVE PROCEDURE:  Cystoscopy, right retrograde pyelogram, right   ureteroscopy.    ANESTHESIA:  General.    COMPLICATIONS:  None.    SPECIMEN:  None.    BLOOD LOSS:  None.    SURGEON:  Lenny Jeffers M.D.    ASSISTANT:  None.    INDICATIONS:  Ms. Caruso is a 57-year-old female with right-sided flank pain,   microscopic hematuria.  She was evaluated with a CAT scan, which demonstrated a   small 4 mm stone in the distal right UVJ.  Pain is persistent, we discussed   treatment options and I recommended right ureteroscopy.    PROCEDURE IN DETAIL:  After informed consent was obtained, she was brought to   the Operating Room.  She was given preoperative antibiotics.  After adequate   general anesthesia was achieved, she was placed in lithotomy position.  The   genitals were prepped and draped sterilely.  Rigid cystoscopy was then   performed.  The bladder mucosa was unremarkable.  There were no lesions, masses,   foreign bodies or stones.  Both UOs appeared to have clear efflux of urine.  I   turned my attention to the right UO.  On  images, I could see no   calcifications.  I intubated the right UO and injected contrast.  The ureter   filled and appeared to be of normal course and caliber.  I was able to follow   the course of the ureter into the proximal ureter; however, due to limitations   of table, but was unable to get adequate visualization of the right renal   pelvis.  I then advanced a wire into the ureter and alongside the wire advanced   a semirigid ureteroscope.  I was able to examine the entire length of the ureter   nearly to the UPJ and I was unable to find any stones.  The ureter appeared to   be normal.  There were no areas of narrowing or mucosal abnormalities.  I then   again withdrew the scope and examined the entire length of the ureter once more   which was  completely normal.  At that point, I terminated the procedure.  The   wire was removed.  The bladder was drained.  She was repositioned supine.  She   was awakened and transported to the PACU in stable condition.      TORI  dd: 12/12/2017 09:43:02 (CST)  td: 12/12/2017 16:19:12 (CST)  Doc ID   #[id number]  Job ID #[id number]    CC:

## 2017-12-12 NOTE — H&P (VIEW-ONLY)
Subjective:       Patient ID: Christin Caruso is a 57 y.o. female.    Chief Complaint: Advice Only    HPI     57 year old with right sided back/flank pain beginning 6-8 weeks ago.  She was being evaluated for ventral hernia and underwent underwent CT scan which demonstrated a small distal right ureteral stone.  She has no history of kidney stone.  I reviewed the images with her.   There is a small 4 mm stone near the right UVJ.  Minimal ureteral dilated an no other stones.  She denies associated nausea.  No gross hematuria and UA is clear today.    Urine dipstick shows negative for all components.    Past Medical History:   Diagnosis Date    Arthritis     Depression     Hypertension     Following with Dr. Craig    MONIKA (obstructive sleep apnea)     Thyroid disease     goiter     Past Surgical History:   Procedure Laterality Date    BUNIONECTOMY      right foot     SECTION      x2    COLONOSCOPY N/A 2016    Procedure: COLONOSCOPY;  Surgeon: Aj Pruitt Jr., MD;  [facility];  Service: Endoscopy;  Laterality: N/A;    TONSILLECTOMY         Current Outpatient Prescriptions:     doxycycline (VIBRAMYCIN) 100 MG Cap, Take 1 capsule (100 mg total) by mouth once daily., Disp: 30 capsule, Rfl: 0    multivitamin (DAILY MULTI-VITAMIN) per tablet, Every day, Disp: , Rfl:     progesterone (PROMETRIUM) 200 MG capsule, TAKE 1 CAPSULE BY MOUTH EVERY FIRST 2 WEEKS OF MONTH., Disp: 45 capsule, Rfl: 0    SYNTHROID 137 mcg Tab tablet, TAKE 1 TABLET BY MOUTH BEFORE BREAKFAST, Disp: 90 tablet, Rfl: 0    vitamin D 1000 units Tab, Take 1,000 Units by mouth once daily., Disp: , Rfl:     epinephrine (EPIPEN) 0.3 mg/0.3 mL (1:1,000) AtIn, Inject 0.3 mLs (0.3 mg total) into the muscle once., Disp: 0.3 mL, Rfl: 3    hydrocodone-acetaminophen 5-325mg (NORCO) 5-325 mg per tablet, Take 1 tablet by mouth every 6 (six) hours as needed for Pain., Disp: 20 tablet, Rfl: 0    meloxicam (MOBIC) 7.5 MG tablet, Take  1 tablet (7.5 mg total) by mouth daily as needed for Pain., Disp: 30 tablet, Rfl: 1    Review of Systems   Constitutional: Negative for fever.   Eyes: Negative for visual disturbance.   Respiratory: Negative for shortness of breath.    Cardiovascular: Negative for chest pain.   Gastrointestinal: Negative for nausea.   Genitourinary: Positive for flank pain. Negative for dysuria and hematuria.   Musculoskeletal: Negative for gait problem.   Skin: Negative for rash.   Neurological: Negative for seizures.   Psychiatric/Behavioral: Negative for confusion.       Objective:      Physical Exam   Constitutional: She is oriented to person, place, and time. She appears well-developed and well-nourished.   HENT:   Head: Normocephalic.   Eyes: Conjunctivae and EOM are normal.   Neck: Normal range of motion.   Cardiovascular: Normal rate.    Pulmonary/Chest: Effort normal.   Abdominal: Soft. She exhibits no distension and no mass. There is no tenderness. There is no CVA tenderness.   Genitourinary:   Genitourinary Comments: Bladder non-tender and nondistended  No CVA tenderness   Musculoskeletal: She exhibits no edema.   Neurological: She is alert and oriented to person, place, and time.   Skin: Skin is warm and dry. No rash noted. No erythema.   Psychiatric: She has a normal mood and affect. Her behavior is normal.   Vitals reviewed.      Assessment:       1. Calculus of ureter    2. Hydronephrosis with obstructing calculus    3. Flank pain        Plan:       Calculus of ureter  -     POCT URINE DIPSTICK WITHOUT MICROSCOPE    Hydronephrosis with obstructing calculus    Flank pain    Other orders  -     doxycycline (VIBRAMYCIN) 100 MG Cap; Take 1 capsule (100 mg total) by mouth once daily.  Dispense: 30 capsule; Refill: 0  -     hydrocodone-acetaminophen 5-325mg (NORCO) 5-325 mg per tablet; Take 1 tablet by mouth every 6 (six) hours as needed for Pain.  Dispense: 20 tablet; Refill: 0      We discussed treatment options.  Longer  trial of passage vs ureteroscopy.  She has elected ureteroscopy.  We discussed procedure including risks.    Recommendations:   Increase hydration 2 liters fluid per day.      Strain Urine     Take pain medications as needed     Call office for severe pain or fever >101

## 2017-12-14 DIAGNOSIS — E03.9 HYPOTHYROIDISM, UNSPECIFIED TYPE: Primary | ICD-10-CM

## 2017-12-15 NOTE — PROGRESS NOTES
Refill Authorization Note     is requesting a refill authorization.    Brief assessment and rationale for refill: APPROVE: pt needs labs  Amount/Quantity of medication ordered: 90d         Refills Authorized: Yes  If authorized number of refills: 0        Medication-related problems identified: Requires labs  Medication Therapy Plan: Needs new TSH; will order; NTBS  Name and strength of medication: SYNTHROID 137 MCG TABLET  How patient will take medication: t1t po daily   Medication reconciliation completed: No  Comments:   Lab Results   Component Value Date    TSH 1.066 01/10/2017    FREET4 0.94 11/12/2015

## 2017-12-17 RX ORDER — LEVOTHYROXINE SODIUM 137 UG/1
TABLET ORAL
Qty: 90 TABLET | Refills: 0 | Status: SHIPPED | OUTPATIENT
Start: 2017-12-17 | End: 2018-01-16 | Stop reason: SDUPTHER

## 2017-12-22 ENCOUNTER — HOSPITAL ENCOUNTER (OUTPATIENT)
Dept: RADIOLOGY | Facility: HOSPITAL | Age: 57
Discharge: HOME | End: 2017-12-22
Attending: SPECIALIST
Payer: COMMERCIAL

## 2017-12-22 DIAGNOSIS — Z82.62 FH: OSTEOPOROSIS: ICD-10-CM

## 2017-12-22 DIAGNOSIS — Z13.820 SCREENING FOR OSTEOPOROSIS: ICD-10-CM

## 2017-12-22 DIAGNOSIS — Z78.0 MENOPAUSE: ICD-10-CM

## 2017-12-22 PROCEDURE — 77080 DXA BONE DENSITY AXIAL: CPT | Mod: 26,,, | Performed by: RADIOLOGY

## 2017-12-22 PROCEDURE — 77080 DXA BONE DENSITY AXIAL: CPT | Mod: TC,PO

## 2018-01-13 DIAGNOSIS — N13.30 HYDRONEPHROSIS, UNSPECIFIED HYDRONEPHROSIS TYPE: ICD-10-CM

## 2018-01-13 DIAGNOSIS — N20.0 KIDNEY STONE: ICD-10-CM

## 2018-01-15 RX ORDER — TAMSULOSIN HYDROCHLORIDE 0.4 MG/1
0.4 CAPSULE ORAL DAILY
Qty: 30 CAPSULE | Refills: 0 | Status: SHIPPED | OUTPATIENT
Start: 2018-01-15 | End: 2018-04-05

## 2018-01-16 ENCOUNTER — OFFICE VISIT (OUTPATIENT)
Dept: FAMILY MEDICINE | Facility: CLINIC | Age: 58
End: 2018-01-16
Payer: COMMERCIAL

## 2018-01-16 ENCOUNTER — TELEPHONE (OUTPATIENT)
Dept: FAMILY MEDICINE | Facility: CLINIC | Age: 58
End: 2018-01-16

## 2018-01-16 VITALS
HEART RATE: 84 BPM | OXYGEN SATURATION: 95 % | RESPIRATION RATE: 18 BRPM | DIASTOLIC BLOOD PRESSURE: 84 MMHG | HEIGHT: 70 IN | BODY MASS INDEX: 34.59 KG/M2 | TEMPERATURE: 98 F | WEIGHT: 241.63 LBS | SYSTOLIC BLOOD PRESSURE: 124 MMHG

## 2018-01-16 DIAGNOSIS — E03.9 HYPOTHYROIDISM, UNSPECIFIED TYPE: ICD-10-CM

## 2018-01-16 DIAGNOSIS — J11.1 FLU SYNDROME: ICD-10-CM

## 2018-01-16 DIAGNOSIS — J40 BRONCHITIS: Primary | ICD-10-CM

## 2018-01-16 LAB
FLUAV AG SPEC QL IA: NEGATIVE
FLUBV AG SPEC QL IA: NEGATIVE
SPECIMEN SOURCE: NORMAL

## 2018-01-16 PROCEDURE — 99999 PR PBB SHADOW E&M-EST. PATIENT-LVL IV: CPT | Mod: PBBFAC,,, | Performed by: PHYSICIAN ASSISTANT

## 2018-01-16 PROCEDURE — 87400 INFLUENZA A/B EACH AG IA: CPT | Mod: PO

## 2018-01-16 PROCEDURE — 99213 OFFICE O/P EST LOW 20 MIN: CPT | Mod: S$GLB,,, | Performed by: PHYSICIAN ASSISTANT

## 2018-01-16 RX ORDER — DOXYCYCLINE 100 MG/1
100 CAPSULE ORAL DAILY
Qty: 30 CAPSULE | Refills: 0 | Status: SHIPPED | OUTPATIENT
Start: 2018-01-16 | End: 2018-09-25

## 2018-01-16 RX ORDER — LEVOTHYROXINE SODIUM 137 UG/1
137 TABLET ORAL
Qty: 90 TABLET | Refills: 0 | Status: SHIPPED | OUTPATIENT
Start: 2018-01-16 | End: 2018-03-20 | Stop reason: SDUPTHER

## 2018-01-16 RX ORDER — AZITHROMYCIN 250 MG/1
TABLET, FILM COATED ORAL
Qty: 6 TABLET | Refills: 0 | Status: SHIPPED | OUTPATIENT
Start: 2018-01-16 | End: 2018-01-21

## 2018-01-16 NOTE — PROGRESS NOTES
Subjective:       Patient ID: Christin Caruso is a 57 y.o. female.    Chief Complaint: Cough; Nasal Congestion; and Sore Throat    HPI   Sx x 10 days  Leaving town   Review of Systems   Constitutional: Negative.  Negative for activity change, appetite change, chills, diaphoresis, fatigue, fever and unexpected weight change.   HENT: Positive for congestion and postnasal drip. Negative for sinus pain, sinus pressure and sore throat.    Eyes: Negative.    Respiratory: Positive for cough. Negative for shortness of breath and wheezing.    Cardiovascular: Negative.  Negative for chest pain and leg swelling.   Gastrointestinal: Negative.    Endocrine: Negative.    Genitourinary: Negative.    Musculoskeletal: Negative.    Skin: Negative.  Negative for rash.       Objective:      Physical Exam   Constitutional: She appears well-developed and well-nourished. No distress.   HENT:   Head: Normocephalic and atraumatic.   Right Ear: External ear normal.   Left Ear: External ear normal.   Nose: Nose normal.   Mouth/Throat: Oropharynx is clear and moist. No oropharyngeal exudate.   Sinuses non tender  Mucus clear   Eyes: Conjunctivae are normal. No scleral icterus.   Neck: Normal range of motion. Neck supple. No tracheal deviation present. No thyromegaly present.   Cardiovascular: Normal rate, regular rhythm, normal heart sounds and intact distal pulses.  Exam reveals no gallop and no friction rub.    No murmur heard.  Pulmonary/Chest: Effort normal and breath sounds normal. No respiratory distress. She has no wheezes. She has no rales.   Musculoskeletal: She exhibits no edema.   Lymphadenopathy:     She has no cervical adenopathy.   Skin: Skin is warm and dry. No rash noted.   Vitals reviewed.      Assessment:       1. Bronchitis    2. Hypothyroidism, unspecified type    3. Flu syndrome        Plan:       Christin was seen today for cough, nasal congestion and sore throat.    Diagnoses and all orders for this visit:    Bronchitis  -      doxycycline (VIBRAMYCIN) 100 MG Cap; Take 1 capsule (100 mg total) by mouth once daily.  -     azithromycin (Z-VINNY) 250 MG tablet; Take 2 tablets by mouth on day 1; Take 1 tablet by mouth on days 2-5    Hypothyroidism, unspecified type  -     SYNTHROID 137 mcg Tab tablet; Take 1 tablet (137 mcg total) by mouth before breakfast.    Flu syndrome  -     Influenza antigen Nasopharyngeal Swab; Future  -     Influenza antigen Nasopharyngeal Swab    discussed otc's  Flu swab neg

## 2018-01-24 ENCOUNTER — TELEPHONE (OUTPATIENT)
Dept: UROLOGY | Facility: CLINIC | Age: 58
End: 2018-01-24

## 2018-01-24 NOTE — TELEPHONE ENCOUNTER
----- Message from Anitha Castillo sent at 1/24/2018  8:51 AM CST -----  Regarding: EKG ORDER  Please put in EKG order, thanks. Anitha [phone]

## 2018-03-19 DIAGNOSIS — Z79.1 NSAID LONG-TERM USE: ICD-10-CM

## 2018-03-19 DIAGNOSIS — E78.5 HYPERLIPIDEMIA, UNSPECIFIED HYPERLIPIDEMIA TYPE: Primary | ICD-10-CM

## 2018-03-19 DIAGNOSIS — E03.9 HYPOTHYROIDISM, UNSPECIFIED TYPE: ICD-10-CM

## 2018-03-20 NOTE — PROGRESS NOTES
Refill Authorization Note     is requesting a refill authorization.    Brief assessment and rationale for refill: APPROVE: needs labs  Amount/Quantity of medication ordered: 90d         Refills Authorized: Yes  If authorized number of refills: 0        Medication-related problems identified: Requires labs  Medication Therapy Plan: TSH already ordered; will order CBC/lipids; NTBS; marianne 3 more   Name and strength of medication: SYNTHROID 137 MCG TABLET  How patient will take medication: t1t po daily   Medication reconciliation completed: No  Comments:   Lab Results   Component Value Date    TSH 1.066 01/10/2017    FREET4 0.94 11/12/2015

## 2018-03-21 RX ORDER — LEVOTHYROXINE SODIUM 137 UG/1
TABLET ORAL
Qty: 90 TABLET | Refills: 0 | Status: SHIPPED | OUTPATIENT
Start: 2018-03-21 | End: 2018-06-25 | Stop reason: SDUPTHER

## 2018-03-22 ENCOUNTER — TELEPHONE (OUTPATIENT)
Dept: FAMILY MEDICINE | Facility: CLINIC | Age: 58
End: 2018-03-22

## 2018-04-05 ENCOUNTER — OFFICE VISIT (OUTPATIENT)
Dept: FAMILY MEDICINE | Facility: CLINIC | Age: 58
End: 2018-04-05
Payer: COMMERCIAL

## 2018-04-05 VITALS
HEART RATE: 76 BPM | HEIGHT: 70 IN | DIASTOLIC BLOOD PRESSURE: 72 MMHG | BODY MASS INDEX: 33.33 KG/M2 | SYSTOLIC BLOOD PRESSURE: 120 MMHG | RESPIRATION RATE: 96 BRPM | WEIGHT: 232.81 LBS

## 2018-04-05 DIAGNOSIS — R10.9 ABDOMINAL PAIN, UNSPECIFIED ABDOMINAL LOCATION: Primary | ICD-10-CM

## 2018-04-05 PROCEDURE — 3074F SYST BP LT 130 MM HG: CPT | Mod: CPTII,S$GLB,, | Performed by: PHYSICIAN ASSISTANT

## 2018-04-05 PROCEDURE — 99999 PR PBB SHADOW E&M-EST. PATIENT-LVL III: CPT | Mod: PBBFAC,,, | Performed by: PHYSICIAN ASSISTANT

## 2018-04-05 PROCEDURE — 99214 OFFICE O/P EST MOD 30 MIN: CPT | Mod: SA,S$GLB,, | Performed by: PHYSICIAN ASSISTANT

## 2018-04-05 PROCEDURE — 3078F DIAST BP <80 MM HG: CPT | Mod: CPTII,S$GLB,, | Performed by: PHYSICIAN ASSISTANT

## 2018-04-05 NOTE — PROGRESS NOTES
Subjective:       Patient ID: Christin Caruso is a 58 y.o. female    Chief Complaint: Abdominal Pain (Pt. states she has increased pain in her ABD hernia  since she's started working out more.)    HPI  Mrs Caruso presents today CO midline abdominal pain and bulging that began several months ago.  She has been seen for it before and told it was rectus abdominis diastasis.  Over the past month  She began working out and the bulge has gotten larger and it is now painful.      Review of Systems   Constitutional: Negative for activity change, chills and fever.   HENT: Negative for congestion and sore throat.    Eyes: Negative for visual disturbance.   Respiratory: Negative for cough and shortness of breath.    Cardiovascular: Negative for chest pain and palpitations.   Gastrointestinal: Positive for abdominal pain. Negative for diarrhea, nausea and vomiting.   Endocrine: Negative for polydipsia and polyuria.   Musculoskeletal: Negative for myalgias.   Skin: Negative for rash.   Neurological: Negative for headaches.   Psychiatric/Behavioral: The patient is not nervous/anxious.         Objective:   Physical Exam   Constitutional: She is oriented to person, place, and time. She appears well-developed and well-nourished. No distress.   HENT:   Head: Normocephalic and atraumatic.   Eyes: EOM are normal. Pupils are equal, round, and reactive to light.   Neck: Normal range of motion. Neck supple.   Cardiovascular: Normal rate, regular rhythm, normal heart sounds and intact distal pulses.  Exam reveals no gallop and no friction rub.    No murmur heard.  Pulmonary/Chest: Effort normal and breath sounds normal. No respiratory distress. She has no wheezes. She has no rales. She exhibits no tenderness.   Abdominal: Soft. Bowel sounds are normal. She exhibits no distension and no mass. There is tenderness (mildline abdominal tenderness in epigastric area). There is no guarding. Hernia: possible hernia, there is diastasis of the rectus  abdominus muscle, with bulge present when paient doing a abdominal crunch, the bulge resolves spontaneously.   Musculoskeletal: Normal range of motion.   Neurological: She is alert and oriented to person, place, and time.   Skin: Skin is warm and dry. No rash noted. She is not diaphoretic.   Psychiatric: She has a normal mood and affect. Her behavior is normal. Judgment and thought content normal.        Assessment:       1. Abdominal pain, unspecified abdominal location  US Abdomen Limited        Plan:       Abdominal pain, unspecified abdominal location  -     US Abdomen Limited; Future; Expected date: 04/05/2018  - possible epigastric hernia VS rectus abdominis diastasis with muscle strain  - no lifting or straining

## 2018-04-09 ENCOUNTER — HOSPITAL ENCOUNTER (OUTPATIENT)
Dept: RADIOLOGY | Facility: HOSPITAL | Age: 58
Discharge: HOME | End: 2018-04-09
Attending: PHYSICIAN ASSISTANT
Payer: COMMERCIAL

## 2018-04-09 DIAGNOSIS — R10.9 ABDOMINAL PAIN, UNSPECIFIED ABDOMINAL LOCATION: ICD-10-CM

## 2018-04-09 PROCEDURE — 76705 ECHO EXAM OF ABDOMEN: CPT | Mod: TC,PO

## 2018-04-09 PROCEDURE — 76705 ECHO EXAM OF ABDOMEN: CPT | Mod: 26,,, | Performed by: RADIOLOGY

## 2018-05-20 NOTE — TELEPHONE ENCOUNTER
----- Message from Belem Obando sent at 9/5/2017 11:17 AM CDT -----  Contact: self  Patient states she is taking Lexapro and it causing her face to break out.  Please call in something else to [id number] [place] - [address] [place] [address] Phone: [phone] Fax: [phone].  Please call patient at [phone].  Thanks!         HIV (human immunodeficiency virus infection)

## 2018-06-25 DIAGNOSIS — E03.9 HYPOTHYROIDISM, UNSPECIFIED TYPE: ICD-10-CM

## 2018-06-25 NOTE — TELEPHONE ENCOUNTER
----- Message from Evette Fournier sent at 6/25/2018  8:34 AM CDT -----  Type:  RX Refill Request    Who Called: patient  Refill or New Rx: refill  RX Name and Strength:   progesterone (PROMETRIUM) 200 MG capsule   SYNTHROID 137 mcg Tab tablet (not 175mg, making her hair fall out)    How is the patient currently taking it? (ex. 1XDay):Na  Is this a 30 day or 90 day RX: 90  Preferred Pharmacy with phone number:   [id number] [place] - [address]  [place] [address]  Phone: [phone] Fax: [phone]      Local or Mail Order:  local  Ordering Provider Asia Tavares Call Back Number:[phone] (home)     Additional Information: [place]

## 2018-06-26 ENCOUNTER — LAB VISIT (OUTPATIENT)
Dept: LAB | Facility: HOSPITAL | Age: 58
End: 2018-06-26
Attending: FAMILY MEDICINE
Payer: COMMERCIAL

## 2018-06-26 ENCOUNTER — OFFICE VISIT (OUTPATIENT)
Dept: PRIMARY CARE CLINIC | Facility: CLINIC | Age: 58
End: 2018-06-26
Payer: COMMERCIAL

## 2018-06-26 ENCOUNTER — TELEPHONE (OUTPATIENT)
Dept: FAMILY MEDICINE | Facility: CLINIC | Age: 58
End: 2018-06-26

## 2018-06-26 VITALS
DIASTOLIC BLOOD PRESSURE: 92 MMHG | BODY MASS INDEX: 33.53 KG/M2 | HEIGHT: 70 IN | OXYGEN SATURATION: 97 % | SYSTOLIC BLOOD PRESSURE: 156 MMHG | TEMPERATURE: 98 F | HEART RATE: 76 BPM | WEIGHT: 234.25 LBS

## 2018-06-26 DIAGNOSIS — G47.00 INSOMNIA, UNSPECIFIED TYPE: ICD-10-CM

## 2018-06-26 DIAGNOSIS — E78.1 HYPERTRIGLYCERIDEMIA: ICD-10-CM

## 2018-06-26 DIAGNOSIS — R42 DIZZINESS: Primary | ICD-10-CM

## 2018-06-26 DIAGNOSIS — R42 DIZZINESS: ICD-10-CM

## 2018-06-26 LAB
ALBUMIN SERPL BCP-MCNC: 4.1 G/DL
ALP SERPL-CCNC: 72 U/L
ALT SERPL W/O P-5'-P-CCNC: 18 U/L
ANION GAP SERPL CALC-SCNC: 7 MMOL/L
AST SERPL-CCNC: 17 U/L
BILIRUB SERPL-MCNC: 0.6 MG/DL
BUN SERPL-MCNC: 19 MG/DL
CALCIUM SERPL-MCNC: 9.8 MG/DL
CHLORIDE SERPL-SCNC: 104 MMOL/L
CO2 SERPL-SCNC: 30 MMOL/L
CREAT SERPL-MCNC: 0.7 MG/DL
EST. GFR  (AFRICAN AMERICAN): >60 ML/MIN/1.73 M^2
EST. GFR  (NON AFRICAN AMERICAN): >60 ML/MIN/1.73 M^2
GLUCOSE SERPL-MCNC: 96 MG/DL
POTASSIUM SERPL-SCNC: 4.6 MMOL/L
PROT SERPL-MCNC: 7.1 G/DL
SODIUM SERPL-SCNC: 141 MMOL/L

## 2018-06-26 PROCEDURE — 3077F SYST BP >= 140 MM HG: CPT | Mod: CPTII,S$GLB,, | Performed by: NURSE PRACTITIONER

## 2018-06-26 PROCEDURE — 80053 COMPREHEN METABOLIC PANEL: CPT

## 2018-06-26 PROCEDURE — 99999 PR PBB SHADOW E&M-EST. PATIENT-LVL IV: CPT | Mod: PBBFAC,,, | Performed by: NURSE PRACTITIONER

## 2018-06-26 PROCEDURE — 3008F BODY MASS INDEX DOCD: CPT | Mod: CPTII,S$GLB,, | Performed by: NURSE PRACTITIONER

## 2018-06-26 PROCEDURE — 99215 OFFICE O/P EST HI 40 MIN: CPT | Mod: S$GLB,,, | Performed by: NURSE PRACTITIONER

## 2018-06-26 PROCEDURE — 3080F DIAST BP >= 90 MM HG: CPT | Mod: CPTII,S$GLB,, | Performed by: NURSE PRACTITIONER

## 2018-06-26 PROCEDURE — 84443 ASSAY THYROID STIM HORMONE: CPT

## 2018-06-26 PROCEDURE — 36415 COLL VENOUS BLD VENIPUNCTURE: CPT | Mod: PO

## 2018-06-26 RX ORDER — LEVOTHYROXINE SODIUM 137 UG/1
137 TABLET ORAL
Qty: 90 TABLET | Refills: 0 | Status: SHIPPED | OUTPATIENT
Start: 2018-06-26 | End: 2019-03-14 | Stop reason: SDUPTHER

## 2018-06-26 RX ORDER — PROGESTERONE 200 MG/1
CAPSULE ORAL
Qty: 45 CAPSULE | Refills: 1 | OUTPATIENT
Start: 2018-06-26

## 2018-06-26 NOTE — PROGRESS NOTES
Refill Authorization Note     is requesting a refill authorization.    Brief assessment and rationale for refill: Defer Progesterone; Approve Synthroid; Needs labs and has appt scheduled  Amount/Quantity of medication ordered: 90        Refills Authorized: Yes  If authorized number of refills: 0        Medication-related problems identified: Requires labs  Medication Therapy Plan: TSH WNL but out of date; pt scheduled appt on 8/2; needs TSH ; approve 3 more;defer progesterone not on protocol  Name and strength of medication: SYNTHROID 137 mcg Tab tablet  How patient will take medication: t1t po daily  Medication reconciliation completed: No  Comments:   Lab Results   Component Value Date    TSH 1.066 01/10/2017    FREET4 0.94 11/12/2015

## 2018-06-26 NOTE — PATIENT INSTRUCTIONS
Get the thyroid test today.  Discuss dosage with Dr. Betancourt at your appointment.  Caution while driving.  Watch your intake of fats and sweets.    Drink plenty of fluids.  Ask about your hair loss.    If you have any questions, please call.  You can reach us at [phone] Monday through Thursday (except holidays) 10 a.m. to 6 p.m. or call Dr. Betancourt/DELONTE Kumar     Evenings and weekends [facility] is available if symptoms worsen or fail to improve.  When you call the number, a registered nurse answers and takes your information.  The nurse can look at your medical record and make recommendations or call the on call physician, if warranted.      [facility] Address: [address] [place] [address] [place] [address] Phone: [phone]    [facility] Address:  Address: [address] [place] [address] [place] [address] Phone: [phone]    Thank you for using the [facility]!    VINITA Olmos, CNP, FNP-BC  [facility]    To rate your experience with CURTIS Olmos, click on the link below:        [email or web address]

## 2018-06-26 NOTE — PROGRESS NOTES
Christin Caruso is a 58 y.o. female patient of ZULLY Betancourt MD who presents to the clinic today for   Chief Complaint   Patient presents with    Dizziness     right after she eats she gets dizzy and feels like she may pass out     Hair Loss     drain stopped up when she gets out    .    HPI    Pt, who is not known to me, reports a new problem to me:  When she eats she gets dizzy--feels like her head is very heavy, wt of 30 lbs on her head, room not spinning, not drowsy, more of a feeling of being light headed..  2 days ago she had a beer and felt immediately like she was having 10 beers.  This happens with any food at any time.  Feels like she has to concentrate when she's talking, like her words are slurring.  Having trouble getting the words out, talking very slowly.  Hair loss that is noticeable over the past months.  She thought it may be r/t a change in her thyroid medication.  Had recently increased the dosage. (>1 yr, in the record).  Always feels achey--has arthritis, feels it could be her weight.    These symptoms began 2 weeks ago and status is worse, doesn't feel comfortable driving.  In between the meals she feels fine.  Hair loss for months.     Pt denies the following symptoms:  Passing out, other activity or action that causes the dizziness,     Aggravating factors include eating.    Relieving factors include time.   Today she ate at 11:30 and now it's 4--still feeling the sxs.  Yesterday she felt better after a nap. .    OTC Medications tried are none.    Prescription medications taken for symptoms are none.    Pertinent history:  No h/o similar sxs prior to 2 weeks ago.  No change in her diet, doesn't eat a healthy diet.    FH:  Mom had dementia in her 70's.    ROS    Constitutional: No fever, + fatigue, no change in appetite.  Doesn't sleep well--has problems fall and staying asleep.  Has a CPAP and does use it.  Her  travels.    GI:  no stomach ache, no nausea, no vomiting, no  "diarrhea, no constipation, no heartburn.    Urinary:  no dysuria, no frequency, no urgency, no flank pain.     HEENT:  No sxs.  Denies change in vision.  Feels like the vision in the periphery is "foggy".  Heart:  No heart palpitations or CP  Lung:  No difficulty breathing or coughing  MS:  Feels very achy at night when she goes to lie down at night.  Skin:  Has a rash on her scalp and her face.  The rash on her face--has rosecea.  Has called for a derm appt.        PAST MEDICAL HISTORY:  Past Medical History:   Diagnosis Date    Arthritis     Depression     Hypertension     Following with Dr. Craig    MONIKA (obstructive sleep apnea)     uses cpap    Thyroid disease     goiter       PAST SURGICAL HISTORY:  Past Surgical History:   Procedure Laterality Date    BUNIONECTOMY      right foot     SECTION      x2    COLONOSCOPY N/A 2016    Procedure: COLONOSCOPY;  Surgeon: Aj Pruitt Jr., MD;  Location: [facility];  Service: Endoscopy;  Laterality: N/A;    TONSILLECTOMY         SOCIAL HISTORY:  Social History     Social History    Marital status:      Spouse name: N/A    Number of children: N/A    Years of education: N/A     Occupational History    Not on file.     Social History Main Topics    Smoking status: Former Smoker     Quit date: 10/1/2012    Smokeless tobacco: Never Used    Alcohol use 0.6 oz/week     1 Glasses of wine per week    Drug use: No    Sexual activity: Yes     Partners: Male     Other Topics Concern    Not on file     Social History Narrative    No narrative on file       FAMILY HISTORY:  Family History   Problem Relation Age of Onset    Heart disease Mother     Arthritis Mother     Diabetes Father     Heart disease Father     Diabetes Sister     Diabetes Brother     Heart disease Brother     Diabetes Paternal Aunt     Heart disease Maternal Grandfather     Cancer Neg Hx        ALLERGIES AND MEDICATIONS: updated and reviewed.  Review of patient's " allergies indicates:   Allergen Reactions    [place] Other (See Comments)     Tongue swells, angiodema     Current Outpatient Prescriptions   Medication Sig Dispense Refill    doxycycline (VIBRAMYCIN) 100 MG Cap Take 1 capsule (100 mg total) by mouth once daily. 30 capsule 0    multivitamin (DAILY MULTI-VITAMIN) per tablet Every day      progesterone (PROMETRIUM) 200 MG capsule TAKE 1 CAPSULE BY MOUTH EVERY FIRST 2 WEEKS OF MONTH. 45 capsule 0    SYNTHROID 137 mcg Tab tablet Take 1 tablet (137 mcg total) by mouth before breakfast. 90 tablet 0    vitamin D 1000 units Tab Take 1,000 Units by mouth once daily.      epinephrine (EPIPEN) 0.3 mg/0.3 mL (1:1,000) AtIn Inject 0.3 mLs (0.3 mg total) into the muscle once. 0.3 mL 3     No current facility-administered medications for this visit.          PHYSICAL EXAM    Alert, coop 58 y.o. female patient in no acute distress, is not ill-appearing.    Vitals:    06/26/18 1530   BP: (!) 156/92   Pulse: 76   Temp: 98.2 °F (36.8 °C)     VS reviewed.  VS elevated BP.  CC, nursing note, medications & PMH all reviewed today.    Head:  Normocephalic, atraumatic.    EENT:  Ext nose/ears normal.               Eye lids normal, no discharge present.                       Resp:  Respirations even, unlabored             Lungs CTA bilat.    NEURO:  Alert and oriented x 4.  Responds appropriately during interaction.                  Gait steady, balance good.   Moves all extremities evenly, symmetrically.                  CN II-XII intact bilat.  No focal findings.                  Sensation to light touch intact over all extremities except decreased over the ant upper arms and chest. .                  Able to do point-to-point, rapid alternating hand movements.                  Patellar reflexes 2+, biceps reflexes 3+.     Heart:  Heart rate normal.  RRR, no MRG on ausculation.    ABD:  Soft, round, NT to palp.  Normal BS in all 4 quadrants.  No rebound or organomegaly.               No peritoneal signs.  No CVAT    MS:  Ambulates normally.      NEURO:  Alert and oriented x 4.  Responds appropriately during interaction.    Skin:  Warm, dry, color good..    Psych:  Responds appropriately throughout the visit.               Relaxed.  Well-groomed.    Dizziness  Comments:  after eating  Orders:  -     Comprehensive metabolic panel; Future; Expected date: 06/26/2018    Insomnia, unspecified type    Hypertriglyceridemia      Pt today presents with report of an argument that resulted in a break in a long-term relationship with a very good friend about 2 weeks ago.  After that she started having dizziness with eating and difficulty with expression of her thoughts.  No other neurological sxs.  FH of dementia in her mother in her 70s.    Normal exam except for decreased sensation in upper arms anteriorly and over the upper chest area.    I discussed the case with Dr. Betancourt.    This is a new problem to me and the following work up is planned.    Lab & Radiological Tests Ordered: TSH (ordered by Dr. Betancourt) and CMP.  The results are pending.    I have reviewed the following additional tests today: CMP, TSH, CBC      Pt advised to perform comfort measures recommended on patient instruction sheet .    Pt scheduled for f/u 7/2/18 per Dr. Betancourt's recommendation.  Explained exam findings, diagnosis and treatment plan to patient and her friend, with her at the visit..  Questions answered and patient states understanding.    45 minutes was spent in face to face evaluation of the patient.  At least 50% of the time was spent in counseling on the following:  Causes of dizziness, treatment for fatty liver, hyperlipidemia, anxiety and stress.

## 2018-06-27 LAB — TSH SERPL DL<=0.005 MIU/L-ACNC: 2.52 UIU/ML

## 2018-06-27 NOTE — TELEPHONE ENCOUNTER
Please note denial of medication. Progesterone will need an appointment for future prescribing Thanks

## 2018-06-29 ENCOUNTER — OFFICE VISIT (OUTPATIENT)
Dept: CARDIOLOGY | Facility: CLINIC | Age: 58
End: 2018-06-29
Payer: COMMERCIAL

## 2018-06-29 VITALS
SYSTOLIC BLOOD PRESSURE: 139 MMHG | DIASTOLIC BLOOD PRESSURE: 81 MMHG | HEART RATE: 63 BPM | BODY MASS INDEX: 33.17 KG/M2 | HEIGHT: 70 IN | WEIGHT: 231.69 LBS

## 2018-06-29 DIAGNOSIS — I10 ESSENTIAL HYPERTENSION: Primary | ICD-10-CM

## 2018-06-29 DIAGNOSIS — R51.9 FREQUENT HEADACHES: ICD-10-CM

## 2018-06-29 PROCEDURE — 3079F DIAST BP 80-89 MM HG: CPT | Mod: CPTII,S$GLB,, | Performed by: INTERNAL MEDICINE

## 2018-06-29 PROCEDURE — 99999 PR PBB SHADOW E&M-EST. PATIENT-LVL II: CPT | Mod: PBBFAC,,, | Performed by: INTERNAL MEDICINE

## 2018-06-29 PROCEDURE — 99204 OFFICE O/P NEW MOD 45 MIN: CPT | Mod: S$GLB,,, | Performed by: INTERNAL MEDICINE

## 2018-06-29 PROCEDURE — 3008F BODY MASS INDEX DOCD: CPT | Mod: CPTII,S$GLB,, | Performed by: INTERNAL MEDICINE

## 2018-06-29 PROCEDURE — 3075F SYST BP GE 130 - 139MM HG: CPT | Mod: CPTII,S$GLB,, | Performed by: INTERNAL MEDICINE

## 2018-06-29 RX ORDER — AMLODIPINE BESYLATE 2.5 MG/1
2.5 TABLET ORAL DAILY
Qty: 30 TABLET | Refills: 3 | Status: SHIPPED | OUTPATIENT
Start: 2018-06-29 | End: 2018-09-25

## 2018-06-29 NOTE — PROGRESS NOTES
Subjective:    Patient ID:  Christin Caruso is a 58 y.o. female who presents for evaluation of new pt (new pt); Dizziness; and Hypertension      HPI58 yo WF with no cardiac hx was seen at the ER for HA's and elevated BP. Has no hx of HTN and difficult to say if the HA is the result of her BP or because of the pain her BP was elevated. States still has HA today and BP and pulse normal. Denies chest pain, SOB, or edema Denies palpitations, weak spells, and syncope Has had some associated dizziness.    Review of Systems   Constitution: Negative for decreased appetite, fever, weakness, malaise/fatigue, weight gain and weight loss.   HENT: Negative for hearing loss and nosebleeds.    Eyes: Negative for visual disturbance.   Cardiovascular: Negative for chest pain, claudication, cyanosis, dyspnea on exertion, irregular heartbeat, leg swelling, near-syncope, orthopnea, palpitations, paroxysmal nocturnal dyspnea and syncope.   Respiratory: Negative for cough, hemoptysis, shortness of breath, sleep disturbances due to breathing, snoring and wheezing.    Endocrine: Negative for cold intolerance, heat intolerance, polydipsia and polyuria.   Hematologic/Lymphatic: Negative for adenopathy and bleeding problem. Does not bruise/bleed easily.   Skin: Negative for color change, itching, poor wound healing, rash and suspicious lesions.   Musculoskeletal: Negative for arthritis, back pain, falls, joint pain, joint swelling, muscle cramps, muscle weakness and myalgias.   Gastrointestinal: Negative for bloating, abdominal pain, change in bowel habit, constipation, flatus, heartburn, hematemesis, hematochezia, hemorrhoids, jaundice, melena, nausea and vomiting.   Genitourinary: Negative for bladder incontinence, decreased libido, frequency, hematuria, hesitancy and urgency.   Neurological: Positive for dizziness and headaches. Negative for brief paralysis, difficulty with concentration, excessive daytime sleepiness, focal weakness,  "light-headedness, loss of balance, numbness and vertigo.   Psychiatric/Behavioral: Negative for altered mental status, depression and memory loss. The patient does not have insomnia and is not nervous/anxious.    Allergic/Immunologic: Negative for environmental allergies, hives and persistent infections.        Objective:    Physical Exam   Constitutional: She is oriented to person, place, and time. She appears well-developed and well-nourished.   /81 (BP Location: Left arm, Patient Position: Sitting, BP Method: Large (Automatic))   Pulse 63   Ht 5' 10" (1.778 m)   Wt 105.1 kg (231 lb 11.3 oz)   BMI 33.25 kg/m²      HENT:   Head: Normocephalic and atraumatic.   Right Ear: External ear normal.   Left Ear: External ear normal.   Nose: Nose normal.   Mouth/Throat: Oropharynx is clear and moist.   Eyes: Conjunctivae, EOM and lids are normal. Pupils are equal, round, and reactive to light. Right eye exhibits no discharge. Left eye exhibits no discharge. Right conjunctiva has no hemorrhage. No scleral icterus.   Neck: Normal range of motion. Neck supple. No JVD present. No tracheal deviation present. No thyromegaly present.   Cardiovascular: Normal rate, regular rhythm, normal heart sounds and intact distal pulses.  Exam reveals no gallop and no friction rub.    No murmur heard.  Pulmonary/Chest: Effort normal and breath sounds normal. No respiratory distress. She has no wheezes. She has no rales. She exhibits no tenderness. Breasts are symmetrical.   Abdominal: Soft. Bowel sounds are normal. She exhibits no distension and no mass. There is no hepatosplenomegaly or hepatomegaly. There is no tenderness. There is no rebound and no guarding.   Musculoskeletal: Normal range of motion. She exhibits no edema or tenderness.   Lymphadenopathy:     She has no cervical adenopathy.   Neurological: She is alert and oriented to person, place, and time. She displays normal reflexes. No cranial nerve deficit. Coordination " normal.   Skin: Skin is warm and dry. No rash noted. No erythema. No pallor.   Psychiatric: She has a normal mood and affect. Her behavior is normal. Judgment and thought content normal.   Nursing note and vitals reviewed.        Assessment:       1. Essential hypertension    2. Frequent headaches         Plan:     Will start low dose amlodipine but no definitive cardiac issues.     Patient advised to modify risk factors such as weight, exercise, diet,  tobacco and alcohol exposure    Patient advised to limit exposure to caffeine, stimulants, and alcohol    No orders of the defined types were placed in this encounter.    Follow-up in about 6 weeks (around 8/10/2018).

## 2018-07-19 ENCOUNTER — PATIENT OUTREACH (OUTPATIENT)
Dept: ADMINISTRATIVE | Facility: HOSPITAL | Age: 58
End: 2018-07-19

## 2018-07-19 NOTE — PROGRESS NOTES
Health Maintenance Due   Topic Date Due    Mammogram  01/12/2018    Pap Smear with HPV Cotest  06/15/2018

## 2018-07-26 LAB
HPV MRNA E6/E7: NOT DETECTED
PAP SMEAR: NORMAL

## 2018-07-27 ENCOUNTER — PATIENT OUTREACH (OUTPATIENT)
Dept: ADMINISTRATIVE | Facility: HOSPITAL | Age: 58
End: 2018-07-27

## 2018-07-27 NOTE — PROGRESS NOTES
Portal outreach un-read by patient.  Outreach mailed today  Health Maintenance Due   Topic Date Due    Mammogram  01/12/2018    Pap Smear with HPV Cotest  06/15/2018

## 2018-07-27 NOTE — LETTER
July 27, 2018    Christin Caruso  [address]  [place] [address]             [facility]  [address]  [facility] [address]  Phone: [phone] Dear MsMichael Caruso:    Portal outreach un-read by patient.  Outreach mailed today      [facility] is committed to your overall health.  To help you get the most out of each of your visits, we will review your information to make sure you are up to date on all of your recommended tests and/or procedures.       Dr. Betancourt     has found that your chart shows you may be due for the following:     Pap smear   Mammogram     If you have had any of the above done at another facility, please bring the records or information with you so that your record at [facility] will be complete.  If you would like to schedule any of these, please contact me.      If you are currently taking medication , please bring it with you to your appointment for review.       If you have any questions or concerns, please don't hesitate to call.    Sincerely,    Briana Chan  Clinical Care Coordinator  [facility] [address] [facility] [address]  [place] [address]  Phone: [phone]   Fax: [phone]

## 2018-08-01 ENCOUNTER — TELEPHONE (OUTPATIENT)
Dept: FAMILY MEDICINE | Facility: CLINIC | Age: 58
End: 2018-08-01

## 2018-08-01 NOTE — TELEPHONE ENCOUNTER
Returned pt's call in regards to appt tomorrow. Pt has an appt for 9/21/18 at 11:20 A for her physical. Pt voiced understanding. CLC

## 2018-08-01 NOTE — TELEPHONE ENCOUNTER
----- Message from Savannah Veliz sent at 8/1/2018  9:34 AM CDT -----  Name of Caller:  Self   When is the first available appointment?  09/2018  Symptoms:  NA   Best Call Back Number:  [phone]  Additional Information:  Patient was advised to schedule with the doctor,  Has to reschedule tomorrows appointment / does Dr want her to schedule with nurse practioner

## 2018-09-07 ENCOUNTER — TELEPHONE (OUTPATIENT)
Dept: ADMINISTRATIVE | Facility: HOSPITAL | Age: 58
End: 2018-09-07

## 2018-09-11 ENCOUNTER — PATIENT OUTREACH (OUTPATIENT)
Dept: ADMINISTRATIVE | Facility: HOSPITAL | Age: 58
End: 2018-09-11

## 2018-09-11 NOTE — LETTER
September 20, 2018    Christin Caruso  [address] [place] [address]             [facility]  [address]  [facility] [address]  Phone: [phone] Dear Mrs. Caruso:    We have tried to reach you by mychart unsuccessfully.      [facility] is committed to your overall health.  To help you get the most out of each of your visits, we will review your information to make sure you are up to date on all of your recommended tests and or procedures.       Dr. Judi Gonsalez MD has found that you may be due for:     Pap smear   Flu vaccine     If you have had any of the above done at another facility, please bring the records or information with you so that your record at [facility] will be complete and up to date.     If you have not had any of these tests or procedures done recently and would like to complete this testing before your appointment on 9/25/18 at 10:40 am with Judi Gonsalez MD please call [phone] or send a message through your [facility] portal to your provider's office.     If you are currently taking medication, please bring it with you to your appointment for review.         Please feel free to call the number listed below if you have any questions.     Thanks,     Lucía Parikh LPN Clinical Care Coordinator   [facility]   [address] [facility] [address]   [place] [address]   [phone] (p)   [phone] (f)

## 2018-09-25 ENCOUNTER — LAB VISIT (OUTPATIENT)
Dept: LAB | Facility: HOSPITAL | Age: 58
End: 2018-09-25
Attending: INTERNAL MEDICINE
Payer: COMMERCIAL

## 2018-09-25 ENCOUNTER — OFFICE VISIT (OUTPATIENT)
Dept: FAMILY MEDICINE | Facility: CLINIC | Age: 58
End: 2018-09-25
Payer: COMMERCIAL

## 2018-09-25 VITALS
HEART RATE: 84 BPM | SYSTOLIC BLOOD PRESSURE: 116 MMHG | HEIGHT: 70 IN | DIASTOLIC BLOOD PRESSURE: 84 MMHG | BODY MASS INDEX: 34.15 KG/M2 | WEIGHT: 238.56 LBS

## 2018-09-25 DIAGNOSIS — M19.042 OSTEOARTHRITIS OF FINGERS OF HANDS, BILATERAL: ICD-10-CM

## 2018-09-25 DIAGNOSIS — M19.041 OSTEOARTHRITIS OF FINGERS OF HANDS, BILATERAL: ICD-10-CM

## 2018-09-25 DIAGNOSIS — E78.5 DYSLIPIDEMIA: ICD-10-CM

## 2018-09-25 DIAGNOSIS — E03.9 HYPOTHYROIDISM, UNSPECIFIED TYPE: ICD-10-CM

## 2018-09-25 DIAGNOSIS — Z00.00 PREVENTATIVE HEALTH CARE: Primary | ICD-10-CM

## 2018-09-25 DIAGNOSIS — E78.1 HYPERTRIGLYCERIDEMIA: ICD-10-CM

## 2018-09-25 LAB
T4 FREE SERPL-MCNC: 1 NG/DL
TSH SERPL DL<=0.005 MIU/L-ACNC: 5.98 UIU/ML

## 2018-09-25 PROCEDURE — 36415 COLL VENOUS BLD VENIPUNCTURE: CPT | Mod: PN

## 2018-09-25 PROCEDURE — 84439 ASSAY OF FREE THYROXINE: CPT

## 2018-09-25 PROCEDURE — 3074F SYST BP LT 130 MM HG: CPT | Mod: CPTII,S$GLB,, | Performed by: INTERNAL MEDICINE

## 2018-09-25 PROCEDURE — 84443 ASSAY THYROID STIM HORMONE: CPT

## 2018-09-25 PROCEDURE — 99999 PR PBB SHADOW E&M-EST. PATIENT-LVL III: CPT | Mod: PBBFAC,,, | Performed by: INTERNAL MEDICINE

## 2018-09-25 PROCEDURE — 3079F DIAST BP 80-89 MM HG: CPT | Mod: CPTII,S$GLB,, | Performed by: INTERNAL MEDICINE

## 2018-09-25 PROCEDURE — 3008F BODY MASS INDEX DOCD: CPT | Mod: CPTII,S$GLB,, | Performed by: INTERNAL MEDICINE

## 2018-09-25 PROCEDURE — 99214 OFFICE O/P EST MOD 30 MIN: CPT | Mod: S$GLB,,, | Performed by: INTERNAL MEDICINE

## 2018-09-25 RX ORDER — BIOTIN 1 MG
1000 TABLET ORAL DAILY
COMMUNITY
End: 2019-11-13

## 2018-09-25 RX ORDER — DICLOFENAC SODIUM 10 MG/G
2 GEL TOPICAL 4 TIMES DAILY PRN
Qty: 100 G | Refills: 2 | Status: SHIPPED | OUTPATIENT
Start: 2018-09-25 | End: 2018-10-05

## 2018-09-25 NOTE — PROGRESS NOTES
Assessment and Plan:    1. Hypothyroidism, unspecified type  Last TSH was WNL, but patient has gone back down to 125 mcg since then. Will recheck TSH. Her main concern is that this could be causing diffuse hair loss. She has discussed other causes of hair loss with her Dermatologist.   - TSH; Future  - T4, free; Future    2. Hypertriglyceridemia  , discussed what this means and what causes this. Patient was encouraged to work on diet and exercise. She has recently restarted weight watchers.     3. Dyslipidemia  Calculated ASCVD risk estimated to be 3.4% based on most recent labs.     4. Osteoarthritis of fingers of hands, bilateral  - diclofenac sodium (VOLTAREN) 1 % Gel; Apply 2 g topically 4 (four) times daily as needed.  Dispense: 100 g; Refill: 2    5. Preventative health care  Just had Pap and mammogram with Dr. Gabriel which she reports were both normal. JESSICA sent today for Pap.   Patient declined flu vaccine today.   ______________________________________________________________________  Subjective:    Chief Complaint:  Establish care, annual    HPI:  Christin is a 58 y.o. year old woman here to establish care and for annual exam.     She has been restarting weight watchers in the last two weeks and is starting to go back to yoga. Notes that she is putting herself back on the priority list.     MONIKA- Notes that she will need a new mask. Goes through [facility]. She has no other problems with this.     Insomnia- Notes that she is able to fall asleep but she typically wakes up at around 3 am. She has tried using several OTC teas to help with sleep. She has not tried melatonin.     Hypothyroidism- Takes levothyroxine (brand Synthroid) 125 mcg daily for the last 2 months. She notes that she has been losing hair more frequently lately. She has seen Dermatology about this multiple times. She had been prescribed some sort of topical steroid which she has been using.     OA- Notes that she has started using  some topical diclofenac that had been given to her as a sample and finds this remarkably helpful for finger arthritis. Wonders if she can get a prescription for this.       Past Medical History:  Past Medical History:   Diagnosis Date    Arthritis     Depression     Hypertension     Following with Dr. Craig    MONIKA (obstructive sleep apnea)     uses cpap    Thyroid disease     goiter       Past Surgical History:  Past Surgical History:   Procedure Laterality Date    BUNIONECTOMY      right foot     SECTION      x2    COLONOSCOPY N/A 2016    Procedure: COLONOSCOPY;  Surgeon: Aj Pruitt Jr., MD;  Location: [facility] ENDO;  Service: Endoscopy;  Laterality: N/A;    COLONOSCOPY N/A 2016    Performed by Aj [facility] Jr., MD at [facility] ENDO    CYSTOSCOPY WITH RETROGRADE PYELOGRAM Right 2017    [facility] Jeffers [facility] at [facility] OR    TONSILLECTOMY      URETEROSCOPY Right 2017    [facility]ers [facility] at [facility] OR       Family History:  Family History   Problem Relation Age of Onset    Heart disease Mother     Arthritis Mother     Diabetes Father     Heart disease Father     Diabetes Sister     Diabetes Brother     Heart disease Brother     Diabetes Paternal Aunt     Heart disease Maternal Grandfather     Cancer Neg Hx        Social History:  Social History     Socioeconomic History    Marital status:      Spouse name: None    Number of children: None    Years of education: None    Highest education level: None   Social Needs    Financial resource strain: None    Food insecurity - worry: None    Food insecurity - inability: None    Transportation needs - medical: None    Transportation needs - non-medical: None   Occupational History    None   Tobacco Use    Smoking status: Former Smoker     Last attempt to quit: 10/1/2012     Years since quittin.9    Smokeless tobacco: Never Used   Substance and Sexual Activity    Alcohol use:  Yes     Alcohol/week: 0.6 oz     Types: 1 Glasses of wine per week     Comment: socially    Drug use: No    Sexual activity: Yes     Partners: Male   Other Topics Concern    None   Social History Narrative    None       Medications:  Current Outpatient Medications on File Prior to Visit   Medication Sig Dispense Refill    AA/prot/lysine/methio/vit C/B6 (A/G PRO ORAL) Take by mouth.      biotin 1 mg tablet Take 1,000 mcg by mouth once daily.       epinephrine (EPIPEN) 0.3 mg/0.3 mL (1:1,000) AtIn Inject 0.3 mLs (0.3 mg total) into the muscle once. 0.3 mL 3    multivitamin (DAILY MULTI-VITAMIN) per tablet Every day      SYNTHROID 137 mcg Tab tablet Take 1 tablet (137 mcg total) by mouth before breakfast. (Patient taking differently: Take 125 mcg by mouth before breakfast. ) 90 tablet 0    vitamin D 1000 units Tab Take 1,000 Units by mouth once daily.      [DISCONTINUED] amLODIPine (NORVASC) 2.5 MG tablet Take 1 tablet (2.5 mg total) by mouth once daily. 30 tablet 3    [DISCONTINUED] doxycycline (VIBRAMYCIN) 100 MG Cap Take 1 capsule (100 mg total) by mouth once daily. 30 capsule 0    [DISCONTINUED] progesterone (PROMETRIUM) 200 MG capsule TAKE 1 CAPSULE BY MOUTH EVERY FIRST 2 WEEKS OF MONTH. 45 capsule 0     No current facility-administered medications on file prior to visit.        Allergies:  Clarence    Immunizations:  Immunization History   Administered Date(s) Administered    Influenza 10/01/2013    Influenza - Quadrivalent 12/08/2014    Influenza Split 10/01/2013    Tdap 10/01/2013       Review of Systems:  Review of Systems   Constitutional: Negative for activity change and fatigue.   Respiratory: Negative for shortness of breath and wheezing.    Cardiovascular: Negative for chest pain and palpitations.   Gastrointestinal: Negative for constipation and diarrhea.   Endocrine: Negative for cold intolerance and heat intolerance.   Skin: Negative for rash.   Psychiatric/Behavioral: Positive for sleep  "disturbance. The patient is not nervous/anxious.        Objective:    Vitals:  Vitals:    09/25/18 1043   BP: 116/84   Pulse: 84   Weight: 108.2 kg (238 lb 8.6 oz)   Height: 5' 10" (1.778 m)   PainSc: 0-No pain       Physical Exam   Constitutional: She is oriented to person, place, and time. She appears well-developed and well-nourished. No distress.   HENT:   Mouth/Throat: Oropharynx is clear and moist.   Eyes: No scleral icterus.   Cardiovascular: Normal rate and regular rhythm.   No murmur heard.  Pulmonary/Chest: Effort normal and breath sounds normal. No respiratory distress. She has no wheezes.   Abdominal: Soft. She exhibits no distension.   Musculoskeletal: She exhibits no edema.   Neurological: She is alert and oriented to person, place, and time.   Skin: Skin is warm and dry.   Psychiatric: She has a normal mood and affect. Her behavior is normal.   Vitals reviewed.      Data:  Previous labs reviewed and pertinent for elevated TG, low HDL in April.        Judi Gonsalez MD  Internal Medicine    "

## 2018-10-02 ENCOUNTER — TELEPHONE (OUTPATIENT)
Dept: FAMILY MEDICINE | Facility: CLINIC | Age: 58
End: 2018-10-02

## 2018-10-02 NOTE — TELEPHONE ENCOUNTER
Pt will call back when she decided which supplier to use so we can fax Rx.   Rx is on Dr Gonsalez's desk to be signed.

## 2018-10-02 NOTE — TELEPHONE ENCOUNTER
----- Message from Malinda Jones sent at 10/2/2018  9:07 AM CDT -----  Contact: self [phone]  She faxed a request for CPAP supplies.  Have you received it?  Also she wants to know how she should get the prescription or will you send it directly to the supplier?  Please call her.

## 2018-12-28 ENCOUNTER — PATIENT OUTREACH (OUTPATIENT)
Dept: ADMINISTRATIVE | Facility: HOSPITAL | Age: 58
End: 2018-12-28

## 2018-12-28 ENCOUNTER — TELEPHONE (OUTPATIENT)
Dept: ADMINISTRATIVE | Facility: HOSPITAL | Age: 58
End: 2018-12-28

## 2018-12-28 ENCOUNTER — OFFICE VISIT (OUTPATIENT)
Dept: ENDOCRINOLOGY | Facility: CLINIC | Age: 58
End: 2018-12-28
Payer: COMMERCIAL

## 2018-12-28 VITALS
BODY MASS INDEX: 31.05 KG/M2 | HEART RATE: 83 BPM | DIASTOLIC BLOOD PRESSURE: 90 MMHG | HEIGHT: 70 IN | SYSTOLIC BLOOD PRESSURE: 130 MMHG | WEIGHT: 216.88 LBS

## 2018-12-28 DIAGNOSIS — E03.9 HYPOTHYROIDISM, UNSPECIFIED TYPE: Primary | ICD-10-CM

## 2018-12-28 DIAGNOSIS — Z86.39 HISTORY OF VITAMIN D DEFICIENCY: ICD-10-CM

## 2018-12-28 DIAGNOSIS — R68.89 ABNORMAL ENDOCRINE LABORATORY TEST FINDING: ICD-10-CM

## 2018-12-28 PROCEDURE — 3080F DIAST BP >= 90 MM HG: CPT | Mod: CPTII,S$GLB,, | Performed by: INTERNAL MEDICINE

## 2018-12-28 PROCEDURE — 3075F SYST BP GE 130 - 139MM HG: CPT | Mod: CPTII,S$GLB,, | Performed by: INTERNAL MEDICINE

## 2018-12-28 PROCEDURE — 3008F BODY MASS INDEX DOCD: CPT | Mod: CPTII,S$GLB,, | Performed by: INTERNAL MEDICINE

## 2018-12-28 PROCEDURE — 99999 PR PBB SHADOW E&M-EST. PATIENT-LVL III: CPT | Mod: PBBFAC,,, | Performed by: INTERNAL MEDICINE

## 2018-12-28 PROCEDURE — 99204 OFFICE O/P NEW MOD 45 MIN: CPT | Mod: S$GLB,,, | Performed by: INTERNAL MEDICINE

## 2018-12-28 RX ORDER — DICLOFENAC SODIUM 75 MG/1
75 TABLET, DELAYED RELEASE ORAL 2 TIMES DAILY
COMMUNITY
End: 2019-05-07

## 2018-12-28 RX ORDER — COLCHICINE 0.6 MG/1
0.6 CAPSULE ORAL
COMMUNITY
End: 2019-03-07

## 2018-12-28 NOTE — PROGRESS NOTES
CHIEF COMPLAINT: Hypothyroidism  58 year old being seen as a f/u. On synthroid 125 mcg. On Brandname. Last seen by me in . States that she has joint pain. States that she is losing hair- for 1.5 years. States that after eating her BG was 67 one day. States after eating Cheetos. Misses approx 3 doses a week. Taking OTC Vit D 1000 daily             PAST MEDICAL HISTORY: Hx of HTN, hypothyroidism, MONIKA (Using CPAP).      PAST SURGICAL HISTORY:      SOCIAL HISTORY: No T/A     FAMILY HISTORY: DM2. Heart disease, hypothyroidism      MEDICATIONS/ALLERGIES: The patient's MedCard has been updated and reviewed.       ROS:   Constitutional: She has gained some weight  Eyes: No recent visual changes  ENT: No dysphagia  Cardiovascular: Denies current anginal symptoms  Respiratory: Denies current respiratory difficulty  Gastrointestinal: No N/V  GenitoUrinary - No dysuria  Skin: No new skin rash  Neurologic: No focal neurologic complaints  Remainder ROS negative        PE:   GENERAL: Well developed, well nourished.  ENT: Nares patent, oropharynx clear, mucosa pink,   NECK: Supple, trachea midline, thyroid is firm and palpable but no nodules  CHEST: Resp even and unlabored, CTA bilateral.  CARDIAC: RRR, S1, S2 heard, no murmurs, rubs, S3, or S4     18  TSH 5,3  FT3 3.0  Glu 111  Insulin 57 (Done after eating)    Results for JOELLE RICHARDS (MRN [id number]) as of 2018 14:58   Ref. Range 2015 10:15 2015 10:15 1/10/2017 09:07 2018 16:39 2018 11:51   TSH Latest Ref Range: 0.400 - 4.000 uIU/mL 3.621 3.621 1.066 2.518 5.981 (H)   Free T4 Latest Ref Range: 0.71 - 1.51 ng/dL 0.94    1.00             ASSESSMENT/PLAN:  1. Hypothyroidism- TSH increased. However states that has been missing doses. Discussed repeating labs after has been more adherent.      2. Elevated Insulin- appears that levels were done after eating. Discussed Insulin levels should be done fasting. Or could do during an episode of  hypoglycemia. Will check a fasting insuin level to assess for insulin resistance. Based on history may have reactive hypoglycemia. Advised she check her BG if symptomatic and keep diary of timing of event, BG, type of foods, etc. Discussed low carb diet and more frequent meals.     3. History of Vitamin Deficiency- check Vit D        FOLLOWUP:  Fasting CMP, Insulin, TSH, Ft4, Vit D- 4 weeks

## 2019-01-11 ENCOUNTER — TELEPHONE (OUTPATIENT)
Dept: ADMINISTRATIVE | Facility: HOSPITAL | Age: 59
End: 2019-01-11

## 2019-01-14 ENCOUNTER — TELEPHONE (OUTPATIENT)
Dept: RHEUMATOLOGY | Facility: CLINIC | Age: 59
End: 2019-01-14

## 2019-01-14 NOTE — TELEPHONE ENCOUNTER
----- Message from Sofi Appiah LPN sent at 1/14/2019  8:55 AM CST -----  There are 2 Dr Ward !! This was sent to Dr Brown in dermatology . This message should hav e been sent to Dr Abbi Brown in Rheumatology   ----- Message -----  From: Marino Hernandez  Sent: 1/14/2019   8:48 AM  To: Neil Abbott Staff, Kevin MONTANO Staff    Type:  Sooner Apoointment Request    Caller is requesting a sooner appointment.  Caller declined first available appointment listed below.  Caller will not accept being placed on the waitlist and is requesting a message be sent to doctor.    Name of Caller:  Patient  When is the first available appointment?  Na  Symptoms:  Arthritis   Best Call Back Number:  [phone]  Additional Information:  New patient needs an appointment

## 2019-01-22 ENCOUNTER — DOCUMENTATION ONLY (OUTPATIENT)
Dept: ADMINISTRATIVE | Facility: HOSPITAL | Age: 59
End: 2019-01-22

## 2019-01-29 ENCOUNTER — LAB VISIT (OUTPATIENT)
Dept: LAB | Facility: HOSPITAL | Age: 59
End: 2019-01-29
Attending: INTERNAL MEDICINE
Payer: COMMERCIAL

## 2019-01-29 DIAGNOSIS — E03.9 HYPOTHYROIDISM, UNSPECIFIED TYPE: ICD-10-CM

## 2019-01-29 DIAGNOSIS — Z86.39 HISTORY OF VITAMIN D DEFICIENCY: ICD-10-CM

## 2019-01-29 DIAGNOSIS — R68.89 ABNORMAL ENDOCRINE LABORATORY TEST FINDING: ICD-10-CM

## 2019-01-29 LAB
25(OH)D3+25(OH)D2 SERPL-MCNC: 26 NG/ML
ALBUMIN SERPL BCP-MCNC: 3.8 G/DL
ALP SERPL-CCNC: 64 U/L
ALT SERPL W/O P-5'-P-CCNC: 22 U/L
ANION GAP SERPL CALC-SCNC: 10 MMOL/L
AST SERPL-CCNC: 18 U/L
BILIRUB SERPL-MCNC: 0.5 MG/DL
BUN SERPL-MCNC: 22 MG/DL
CALCIUM SERPL-MCNC: 9.2 MG/DL
CHLORIDE SERPL-SCNC: 108 MMOL/L
CO2 SERPL-SCNC: 26 MMOL/L
CREAT SERPL-MCNC: 0.7 MG/DL
EST. GFR  (AFRICAN AMERICAN): >60 ML/MIN/1.73 M^2
EST. GFR  (NON AFRICAN AMERICAN): >60 ML/MIN/1.73 M^2
GLUCOSE SERPL-MCNC: 100 MG/DL
INSULIN COLLECTION INTERVAL: 0
INSULIN SERPL-ACNC: 11.8 UU/ML
POTASSIUM SERPL-SCNC: 4.3 MMOL/L
PROT SERPL-MCNC: 6.9 G/DL
SODIUM SERPL-SCNC: 144 MMOL/L
T4 FREE SERPL-MCNC: 1.04 NG/DL
TSH SERPL DL<=0.005 MIU/L-ACNC: 2.4 UIU/ML

## 2019-01-29 PROCEDURE — 84439 ASSAY OF FREE THYROXINE: CPT

## 2019-01-29 PROCEDURE — 80053 COMPREHEN METABOLIC PANEL: CPT

## 2019-01-29 PROCEDURE — 82306 VITAMIN D 25 HYDROXY: CPT

## 2019-01-29 PROCEDURE — 36415 COLL VENOUS BLD VENIPUNCTURE: CPT | Mod: PO

## 2019-01-29 PROCEDURE — 84443 ASSAY THYROID STIM HORMONE: CPT

## 2019-01-29 PROCEDURE — 83525 ASSAY OF INSULIN: CPT

## 2019-01-30 ENCOUNTER — PATIENT MESSAGE (OUTPATIENT)
Dept: ENDOCRINOLOGY | Facility: CLINIC | Age: 59
End: 2019-01-30

## 2019-01-30 NOTE — TELEPHONE ENCOUNTER
Let her know that her insulin level is normal. Would increase her Vit D intake by 1000 daily.   Thyroid levels are now normal.   As far as low BG episode advised her at our last meeting:    Advised she check her BG if symptomatic and keep diary of timing of event, BG, type of foods, etc. Discussed low carb diet and more frequent meals.

## 2019-02-21 ENCOUNTER — OFFICE VISIT (OUTPATIENT)
Dept: PODIATRY | Facility: CLINIC | Age: 59
End: 2019-02-21
Payer: COMMERCIAL

## 2019-02-21 ENCOUNTER — HOSPITAL ENCOUNTER (OUTPATIENT)
Dept: RADIOLOGY | Facility: HOSPITAL | Age: 59
Discharge: HOME | End: 2019-02-21
Attending: PODIATRIST
Payer: COMMERCIAL

## 2019-02-21 VITALS
BODY MASS INDEX: 34.94 KG/M2 | DIASTOLIC BLOOD PRESSURE: 79 MMHG | HEART RATE: 70 BPM | SYSTOLIC BLOOD PRESSURE: 133 MMHG | HEIGHT: 70 IN | WEIGHT: 244.06 LBS

## 2019-02-21 DIAGNOSIS — M25.871 PREDISLOCATION SYNDROME OF METATARSOPHALANGEAL JOINT OF RIGHT FOOT: Primary | ICD-10-CM

## 2019-02-21 DIAGNOSIS — M79.671 PAIN IN RIGHT FOOT: ICD-10-CM

## 2019-02-21 PROCEDURE — 3075F SYST BP GE 130 - 139MM HG: CPT | Mod: CPTII,S$GLB,, | Performed by: PODIATRIST

## 2019-02-21 PROCEDURE — 73630 X-RAY EXAM OF FOOT: CPT | Mod: 26,RT,, | Performed by: RADIOLOGY

## 2019-02-21 PROCEDURE — 99203 OFFICE O/P NEW LOW 30 MIN: CPT | Mod: S$GLB,,, | Performed by: PODIATRIST

## 2019-02-21 PROCEDURE — 73630 X-RAY EXAM OF FOOT: CPT | Mod: TC,PO,RT

## 2019-02-21 PROCEDURE — 3008F BODY MASS INDEX DOCD: CPT | Mod: CPTII,S$GLB,, | Performed by: PODIATRIST

## 2019-02-21 PROCEDURE — 3078F DIAST BP <80 MM HG: CPT | Mod: CPTII,S$GLB,, | Performed by: PODIATRIST

## 2019-02-21 PROCEDURE — 3008F PR BODY MASS INDEX (BMI) DOCUMENTED: ICD-10-PCS | Mod: CPTII,S$GLB,, | Performed by: PODIATRIST

## 2019-02-21 PROCEDURE — 3075F PR MOST RECENT SYSTOLIC BLOOD PRESS GE 130-139MM HG: ICD-10-PCS | Mod: CPTII,S$GLB,, | Performed by: PODIATRIST

## 2019-02-21 PROCEDURE — 99999 PR PBB SHADOW E&M-EST. PATIENT-LVL III: ICD-10-PCS | Mod: PBBFAC,,, | Performed by: PODIATRIST

## 2019-02-21 PROCEDURE — 99203 PR OFFICE/OUTPT VISIT, NEW, LEVL III, 30-44 MIN: ICD-10-PCS | Mod: S$GLB,,, | Performed by: PODIATRIST

## 2019-02-21 PROCEDURE — 3078F PR MOST RECENT DIASTOLIC BLOOD PRESSURE < 80 MM HG: ICD-10-PCS | Mod: CPTII,S$GLB,, | Performed by: PODIATRIST

## 2019-02-21 PROCEDURE — 73630 XR FOOT COMPLETE 3 VIEW RIGHT: ICD-10-PCS | Mod: 26,RT,, | Performed by: RADIOLOGY

## 2019-02-21 PROCEDURE — 99999 PR PBB SHADOW E&M-EST. PATIENT-LVL III: CPT | Mod: PBBFAC,,, | Performed by: PODIATRIST

## 2019-02-21 NOTE — PATIENT INSTRUCTIONS
- Wear supportive shoes such as sneakers with arch supports.    - Look for Superfeet or Powerstep arch supports.    - Rest/reduce ambulation to necessary activities of daily living.    - Ice foot for no more than 20 minutes/per hour.    - Elevate foot as much as possible throughout the day.    - Perform toe taping daily or wear Weil osteotomy strap ([email or web address]) daily.    - Take diclofenac as prescribed.    - Apply OTC topical arnica to affected area for pain relief and to reduce bruising/swelling.    - Look for 's Remedy nail polish and nail polish remover.    - Keep scheduled appointment for xrays.    - Notify clinic if pain worsens or fails to improve.    - Follow up in 2 weeks for foot pain.

## 2019-02-22 NOTE — PROGRESS NOTES
Subjective:      Patient ID: Christin Caruso is a 59 y.o. female.    Chief Complaint: Foot Problem (hammer toe bunions   PCP  Judi Gonsalez  9/25/18  ENDO  [facility]  12/28/18)      HPI:  Chritsin Caruso is a 59 y.o. female who presents to clinic with a chief complaint of right foot pain.  Patient reports pain in the ball of her right foot behind her 2nd toe on the bottom that has been ongoing for several weeks and increasing without any history of trauma.  She rates pain as 8/10 on the pain scale at its worst.  She relates colchicine hasn't helped with the pain.  She admits not using Voltaren on her foot and denies taking any oral NSAIDs as she is trying to avoid them as they cause acid reflux.  She denies any other previous treatment.  Patient denies any other pedal complaints at this time.    PCP: Judi Gonsalez MD  Date last seen: 9/25/18    Review of Systems   Constitutional: Negative for appetite change, fever, chills, fatigue and unexpected weight change.   Respiratory: Negative for cough, wheezing, and shortness of breath.   Cardiovascular: Negative for chest pain, claudication, and cyanosis.  Positive for leg swelling.  Endocrine:  Negative for intolerance to cold, intolerance to heat, polydipsia, polyphagia, and polyuria.    Gastrointestinal: Negative for nausea, vomiting, diarrhea, and constipation.   Musculoskeletal: Negative for back pain.  Positive for arthritis, joint pain, joint swelling, myalgias, and stiffness.   Skin: Negative for nail bed changes, discoloration, rash, itching, poor wound healing, and suspicious lesion.  Positive for unusual hair distribution.   Neurological: Negative for loss of balance.  Positive for sensory change, paresthesias, and numbness.   Hematological: Negative for adenopathy, bleeding, and bruising easily.   Psychiatric/Behavioral: The patient is not nervous/anxious.  Negative for altered mental status.    Hemoglobin A1C   Date Value Ref Range Status   10/10/2007 5.7 4.5 - 6.2 %  Final   2005 5.4 4.5 - 6.2 % Final       Past Medical History:   Diagnosis Date    Arthritis     Depression     Hypertension     Following with Dr. Craig    Hypothyroidism     MONIKA (obstructive sleep apnea)     uses cpap    Thyroid disease     goiter     Past Surgical History:   Procedure Laterality Date    BUNIONECTOMY      right foot     SECTION      x2    COLONOSCOPY N/A 2016    [facility]    CYSTOSCOPY WITH RETROGRADE PYELOGRAM Right 2017    [facility] at [facility]    TONSILLECTOMY      URETEROSCOPY Right 2017    [facility] at [facility]     Family History   Problem Relation Age of Onset    Heart disease Mother     Arthritis Mother     Diabetes Father     Heart disease Father     Diabetes Sister     Diabetes Brother     Heart disease Brother     Diabetes Paternal Aunt     Heart disease Maternal Grandfather     Cancer Neg Hx      Social History     Socioeconomic History    Marital status:      Spouse name: None    Number of children: None    Years of education: None    Highest education level: None   Social Needs    Financial resource strain: None    Food insecurity - worry: None    Food insecurity - inability: None    Transportation needs - medical: None    Transportation needs - non-medical: None   Occupational History    None   Tobacco Use    Smoking status: Former Smoker     Packs/day: 0.15     Years: 4.00     Pack years: 0.60     Types: Cigarettes     Last attempt to quit: 10/1/2012     Years since quittin.4    Smokeless tobacco: Never Used   Substance and Sexual Activity    Alcohol use: Yes     Alcohol/week: 0.6 oz     Types: 1 Glasses of wine per week     Comment: socially    Drug use: No    Sexual activity: Yes     Partners: Male   Other Topics Concern    None   Social History Narrative    None           Objective:        /79 (BP Location:  "Right arm, Patient Position: Sitting)   Pulse 70   Ht 5' 10" (1.778 m)   Wt 110.7 kg (244 lb 0.8 oz)   BMI 35.02 kg/m²     Physical Exam   Constitutional: Patient is oriented to person, place, and time. Patient appears well-developed and well-nourished. No acute distress.     Psychiatric: Patient has a normal mood and affect. Patient's speech is normal and behavior is normal. Judgment is normal. Cognition and memory are normal.     Right pedal exam was performed today.  Vascular: Pedal pulses palpable 2/4 DP & PT.  CFT is = 3 seconds to the hallux.  Skin temperature is warm to cool proximal tibia to distal toes without localized increase in calor noted.  No erythema, edema, or ecchymosis noted to the foot or ankle.  Hair growth decreased distally to the LE.     Musculoskeletal: Ankle joint ROM is decreased. Subtalar joint ROM is decreased.  Midtarsal joint ROM is within normal limits.  1st ray ROM is within normal limits.  1st  MTPJ ROM is decreased.  Ankle joint dorsiflexion is restricted with the knee extended and flexed per Silfverskiold exam.    Muscle strength is 5/5 for all LE muscle groups tested.    Neurological: Protective sensation is intact to 8/10 sites on the right foot via Tram-Chema monofilament.    Proprioception is Intact to the foot.  Vibratory sensation is Decreased to the foot.   Light touch sensation is Intact to the foot.   Achilles DTR and Chaddock STR are Intact to right lower extremity.   Negative Babinski sign right lower extremity.  Negative clonus sign right lower extremity.  Tenderness to palpation plantarly and ROM of the right 2nd MTPJ.    Dermatological: Toenails 1-5 right are WNL in length and thickness.  Webspaces 1-4 right are clean, dry, and intact.  Skin turgor is supple.  No dry, flaky skin noted to the LE.  No open wounds or suspicious pigmented lesions appreciable to the foot or ankle.    Nursing note and vitals reviewed.        Assessment:       Encounter Diagnoses "   Name Primary?   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    - Ordered and will notify patient of xray results.    - Educated patient on proper foot care and supportive, accommodative shoe gear.    - Educated patient on PRICE therapy.    - Instructed patient on how to perform toe taping to reduce subluxation if she chooses to not get brace online.    - Recommended patient try Weil osteotomy strap before proceeding to physical therapy and steroid injections.  Will order MRI if little or no improvement with conservative therapy.    Patient was given the following recommendations and instructions:  Patient Instructions   - Wear supportive shoes such as sneakers with arch supports.    - Look for Superfeet or Powerstep arch supports.    - Rest/reduce ambulation to necessary activities of daily living.    - Ice foot for no more than 20 minutes/per hour.    - Elevate foot as much as possible throughout the day.    - Perform toe taping daily or wear Weil osteotomy strap ([email or web address]) daily.    - Take diclofenac as prescribed.    - Apply OTC topical arnica to affected area for pain relief and to reduce bruising/swelling.    - Look for 's Remedy nail polish and nail polish remover.    - Keep scheduled appointment for xrays.    - Notify clinic if pain worsens or fails to improve.    - Follow up in 2 weeks for foot pain.          Sonja Chaves DPM        Dictation was performed using M*Modal Fluency.  Transcription errors may be present.

## 2019-02-22 NOTE — PROGRESS NOTES
RHEUMATOLOGY CLINIC INITIAL VISIT        Chief complaints:- Hand pain       HPI:-  Christin Proctor a 59 y.o. pleasant female with history of Hashimotos thyroiditis, OA, and DDD present to the clinic for evaluation of hand pain     Patient seen Dr. Calle in 2012 for evaluation of multiple joints (knees, hips, and hand) pain which started in 2009.  Labs were significant for +NIESHA (1:160 homogenous) with negative pattern.  Patient then went to Dr. Brown in Feb 2016 for multiple joint pain that was alleviated with Alleve.      Started around 2010 - where patient started to feel R 3rd DIP joint pain.  Achy pain with movement.  Went to OT previously and was helping with the ROM.  Alleve was alleviating symptoms.  Went to Dr. Calle for evaluation in 2012.    In 2016 - symptoms persist and worsen, went to Dr. Brown in 2016 for evaluation.  Alleve still alleviated the symptom.  Pain spreading to the 2nd and 4th digits. PIP and DIP along with 3rd PIP joint.      Recently, about 6 months notice spreading of symptoms to entire R hand.  Now L hand symptoms.   Am stiffness < 5 minutes (back, hips, and legs).  Heat and alleve alleviates symptoms.     Previously had R bunion and hammer toes s/p surgery.  Hammer toes failed and podiatry trying to amend it non-surgically.  On diclofenac which alleviated symptoms of the foot but not the hand.     Family history - hand OA (mother and sister)     Former smoker 1/2ppd x 5 years. Quit 5 years ago.  Social drinker and denies any recreational drugs/medication usage.     Denies rash, mouth ulcers, sicca syndrome, Raynaud's, headaches, myalgia, unintentional weight loss, N/V, fever/chills, abdominal pain, CP, SOB, urinary/bowel symptoms, dysphagia.    +alopecia that started 2 years ago.  Followed dermatologist - did not notice rash/bald spots.      Review of Systems   Constitutional: Negative for chills, diaphoresis, fever,  malaise/fatigue and weight loss.   HENT: Negative for congestion, ear discharge, ear pain, hearing loss, nosebleeds, sinus pain and tinnitus.    Eyes: Negative for photophobia, pain, discharge and redness.   Respiratory: Negative for cough, hemoptysis, sputum production, shortness of breath, wheezing and stridor.    Cardiovascular: Negative for chest pain, palpitations, orthopnea, claudication, leg swelling and PND.   Gastrointestinal: Positive for constipation. Negative for abdominal pain, diarrhea, heartburn, nausea and vomiting.   Genitourinary: Negative for dysuria, frequency, hematuria and urgency.   Musculoskeletal: Positive for back pain. Negative for joint pain, myalgias and neck pain.   Skin: Negative for rash.   Neurological: Negative for dizziness, tingling, tremors, weakness and headaches.   Endo/Heme/Allergies: Does not bruise/bleed easily.   Psychiatric/Behavioral: Negative for depression, hallucinations and suicidal ideas. The patient is not nervous/anxious and does not have insomnia.        Past Medical History:   Diagnosis Date    Arthritis     Depression     Hypertension     Following with Dr. Craig    Hypothyroidism     MONIKA (obstructive sleep apnea)     uses cpap    Thyroid disease     goiter       Past Surgical History:   Procedure Laterality Date    BUNIONECTOMY      right foot     SECTION      x2    COLONOSCOPY N/A 2016    [facility]    CYSTOSCOPY WITH RETROGRADE PYELOGRAM Right 2017    Performed by [facility] Jeffers MD at [facility] OR    TONSILLECTOMY      URETEROSCOPY Right 2017    Performed by [facility]ers [facility] at [facility] OR        Social History     Tobacco Use    Smoking status: Former Smoker     Packs/day: 0.15     Years: 4.00     Pack years: 0.60     Types: Cigarettes     Last attempt to quit: 10/1/2012     Years since quittin.4    Smokeless tobacco: Never Used   Substance Use Topics    Alcohol use: Yes      "Alcohol/week: 0.6 oz     Types: 1 Glasses of wine per week     Comment: socially    Drug use: No       Family History   Problem Relation Age of Onset    Heart disease Mother     Arthritis Mother     Diabetes Father     Heart disease Father     Diabetes Sister     Diabetes Brother     Heart disease Brother     Diabetes Paternal Aunt     Heart disease Maternal Grandfather     Cancer Neg Hx        Review of patient's allergies indicates:   Allergen Reactions    [place] Other (See Comments)     Tongue swells, angiodema           Physical examination:-    Vitals:    02/26/19 1104   BP: 136/77   Pulse: 68   Weight: 111 kg (244 lb 11.4 oz)   Height: 5' 10" (1.778 m)   PainSc: 0-No pain       Physical Exam   Constitutional: She is oriented to person, place, and time and well-developed, well-nourished, and in no distress.   HENT:   Head: Normocephalic and atraumatic.   No mouth ulcers    Eyes: EOM are normal. Pupils are equal, round, and reactive to light.   Neck: Normal range of motion. Neck supple.   Cardiovascular: Normal rate, regular rhythm and normal heart sounds.   Pulmonary/Chest: Effort normal and breath sounds normal.   Abdominal: Soft. Bowel sounds are normal.   Musculoskeletal: Normal range of motion. She exhibits edema and tenderness. She exhibits no deformity.   +CMC in bilateral 1st digit  +bony formation of R 3rd and 4th PIP and DIP  + synovitis of R 3rd MCP  Decreased  strength of R hand   ROM intact in all other joints  Schober test +3cm  +neuropathy of bilateral LE up to thigh  + SI joint pain    Neurological: She is alert and oriented to person, place, and time. Gait normal. GCS score is 15.   Skin: Skin is warm and dry. No rash noted. No erythema.   Bilateral temporal hairline rash (L>R) erythematous, scaly, and dried skin.  No other rash (including umbilicus and gluteal folds)   Psychiatric: Mood, memory, affect and judgment normal.           Labs:-  Results for SUSIE JOELLE (MRN " [id number]) as of 2/26/2019 11:36   Ref. Range 6/28/2018 23:49 7/26/2018 00:00 9/25/2018 11:51 1/29/2019 09:06 2/21/2019 10:34   Sodium Latest Ref Range: 136 - 145 mmol/L    144    Potassium Latest Ref Range: 3.5 - 5.1 mmol/L    4.3    Chloride Latest Ref Range: 95 - 110 mmol/L    108    CO2 Latest Ref Range: 23 - 29 mmol/L    26    Anion Gap Latest Ref Range: 8 - 16 mmol/L    10    BUN, Bld Latest Ref Range: 6 - 20 mg/dL    22 (H)    Creatinine Latest Ref Range: 0.5 - 1.4 mg/dL    0.7    eGFR if non African American Latest Ref Range: >60 mL/min/1.73 m^2    >60.0    eGFR if African American Latest Ref Range: >60 mL/min/1.73 m^2    >60.0    Glucose Latest Ref Range: 70 - 110 mg/dL    100    Calcium Latest Ref Range: 8.7 - 10.5 mg/dL    9.2    Alkaline Phosphatase Latest Ref Range: 55 - 135 U/L    64    Total Protein Latest Ref Range: 6.0 - 8.4 g/dL    6.9    Albumin Latest Ref Range: 3.5 - 5.2 g/dL    3.8    Total Bilirubin Latest Ref Range: 0.1 - 1.0 mg/dL    0.5    AST Latest Ref Range: 10 - 40 U/L    18    ALT Latest Ref Range: 10 - 44 U/L    22    Vit D, 25-Hydroxy Latest Ref Range: 30 - 96 ng/mL    26 (L)    Insulin Latest Ref Range: <25.0 uU/mL    11.8    Insulin Collection Interval Unknown    0    TSH Latest Ref Range: 0.400 - 4.000 uIU/mL   5.981 (H) 2.398    Free T4 Latest Ref Range: 0.71 - 1.51 ng/dL   1.00 1.04        Radiographs:-  Independent visualization of images done.   12/2014 R shoulder - R AC joint DJD. R supraspinatus and infraspinatus calcific tenonitis  1/2016  Bilateral hand - DJD of the hand. No erosions  1/2016 Lumbar spine - intervertebral disk height loss         Medication List with Changes/Refills   Current Medications    AA/PROT/LYSINE/METHIO/VIT C/B6 (A/G PRO ORAL)    Take by mouth.    BIOTIN 1 MG TABLET    Take 1,000 mcg by mouth once daily.     COLCHICINE (MITIGARE) 0.6 MG CAP    Take 0.6 mg by mouth.    DICLOFENAC (VOLTAREN) 75 MG EC TABLET    Take 75 mg by mouth 2 (two) times daily.     EPINEPHRINE (EPIPEN) 0.3 MG/0.3 ML (1:1,000) ATIN    Inject 0.3 mLs (0.3 mg total) into the muscle once.    MULTIVITAMIN (DAILY MULTI-VITAMIN) PER TABLET    Every day    SYNTHROID 137 MCG TAB TABLET    Take 1 tablet (137 mcg total) by mouth before breakfast.    TRAMADOL (ULTRAM) 50 MG TABLET    Take 1 tablet (50 mg total) by mouth every 8 (eight) hours as needed for Pain.    VITAMIN D 1000 UNITS TAB    Take 1,000 Units by mouth once daily.       Assessment/Plans:-  59 y.o. pleasant female with history of Hashimotos thyroiditis, OA, and DDD present to the clinic for evaluation of hand pain    Bilateral hand pain that started 9 years and continues to worsen.      Physical examination remarkable for decrease Schober test, bilateral hand osteophyte formation, bilateral LE neuropathy, and skin plaques of bilateral temporal hairline.     Previous blood work was +NIESHA (1:160 homogenous) with negative profile.    Concern for autoimmune/connective tissue disease (is PsA, RA, SLE, and Ankylosing spondylitis)  Will rule out with blood work and imaging.      Hand pain is most likely OA - but also concerning for other autoimmune diseases.     Plan  - CBC, CMP, ESR/CRP  - NIESHA, SSA, HLA B27  - RF/CCP  - PreDMARDS  - Arthritis survey and SI joints  - Patient educated to follow with outside dermatologist for evaluation of temporal hairline rash         # RTC in 6 weeks

## 2019-02-23 ENCOUNTER — PATIENT MESSAGE (OUTPATIENT)
Dept: PODIATRY | Facility: CLINIC | Age: 59
End: 2019-02-23

## 2019-02-23 DIAGNOSIS — M79.671 PAIN IN RIGHT FOOT: ICD-10-CM

## 2019-02-23 DIAGNOSIS — S99.921A INJURY OF PLANTAR PLATE OF RIGHT FOOT, INITIAL ENCOUNTER: ICD-10-CM

## 2019-02-23 DIAGNOSIS — S93.306A: Primary | ICD-10-CM

## 2019-02-25 RX ORDER — TRAMADOL HYDROCHLORIDE 50 MG/1
50 TABLET ORAL EVERY 8 HOURS PRN
Qty: 30 TABLET | Refills: 0 | Status: SHIPPED | OUTPATIENT
Start: 2019-02-25 | End: 2019-03-07

## 2019-02-26 ENCOUNTER — HOSPITAL ENCOUNTER (OUTPATIENT)
Dept: RADIOLOGY | Facility: HOSPITAL | Age: 59
Discharge: HOME | End: 2019-02-26
Attending: STUDENT IN AN ORGANIZED HEALTH CARE EDUCATION/TRAINING PROGRAM
Payer: COMMERCIAL

## 2019-02-26 ENCOUNTER — OFFICE VISIT (OUTPATIENT)
Dept: RHEUMATOLOGY | Facility: CLINIC | Age: 59
End: 2019-02-26
Payer: COMMERCIAL

## 2019-02-26 VITALS
BODY MASS INDEX: 35.03 KG/M2 | HEIGHT: 70 IN | HEART RATE: 68 BPM | SYSTOLIC BLOOD PRESSURE: 136 MMHG | WEIGHT: 244.69 LBS | DIASTOLIC BLOOD PRESSURE: 77 MMHG

## 2019-02-26 DIAGNOSIS — R76.8 POSITIVE ANA (ANTINUCLEAR ANTIBODY): ICD-10-CM

## 2019-02-26 DIAGNOSIS — M25.50 ARTHRALGIA, UNSPECIFIED JOINT: Primary | ICD-10-CM

## 2019-02-26 DIAGNOSIS — L65.9 ALOPECIA: ICD-10-CM

## 2019-02-26 DIAGNOSIS — M25.50 ARTHRALGIA, UNSPECIFIED JOINT: ICD-10-CM

## 2019-02-26 DIAGNOSIS — M54.9 BACK PAIN, UNSPECIFIED BACK LOCATION, UNSPECIFIED BACK PAIN LATERALITY, UNSPECIFIED CHRONICITY: ICD-10-CM

## 2019-02-26 PROCEDURE — 99999 PR PBB SHADOW E&M-EST. PATIENT-LVL IV: CPT | Mod: PBBFAC,,, | Performed by: STUDENT IN AN ORGANIZED HEALTH CARE EDUCATION/TRAINING PROGRAM

## 2019-02-26 PROCEDURE — 3075F PR MOST RECENT SYSTOLIC BLOOD PRESS GE 130-139MM HG: ICD-10-PCS | Mod: CPTII,S$GLB,, | Performed by: STUDENT IN AN ORGANIZED HEALTH CARE EDUCATION/TRAINING PROGRAM

## 2019-02-26 PROCEDURE — 77077 JOINT SURVEY SINGLE VIEW: CPT | Mod: TC

## 2019-02-26 PROCEDURE — 72200 XR SACROILIAC JOINTS 3 VIEWS: ICD-10-PCS | Mod: 26,,, | Performed by: RADIOLOGY

## 2019-02-26 PROCEDURE — 3075F SYST BP GE 130 - 139MM HG: CPT | Mod: CPTII,S$GLB,, | Performed by: STUDENT IN AN ORGANIZED HEALTH CARE EDUCATION/TRAINING PROGRAM

## 2019-02-26 PROCEDURE — 77077 JOINT SURVEY SINGLE VIEW: CPT | Mod: 26,,, | Performed by: RADIOLOGY

## 2019-02-26 PROCEDURE — 3078F DIAST BP <80 MM HG: CPT | Mod: CPTII,S$GLB,, | Performed by: STUDENT IN AN ORGANIZED HEALTH CARE EDUCATION/TRAINING PROGRAM

## 2019-02-26 PROCEDURE — 99204 PR OFFICE/OUTPT VISIT, NEW, LEVL IV, 45-59 MIN: ICD-10-PCS | Mod: S$GLB,,, | Performed by: STUDENT IN AN ORGANIZED HEALTH CARE EDUCATION/TRAINING PROGRAM

## 2019-02-26 PROCEDURE — 3008F BODY MASS INDEX DOCD: CPT | Mod: CPTII,S$GLB,, | Performed by: STUDENT IN AN ORGANIZED HEALTH CARE EDUCATION/TRAINING PROGRAM

## 2019-02-26 PROCEDURE — 99999 PR PBB SHADOW E&M-EST. PATIENT-LVL IV: ICD-10-PCS | Mod: PBBFAC,,, | Performed by: STUDENT IN AN ORGANIZED HEALTH CARE EDUCATION/TRAINING PROGRAM

## 2019-02-26 PROCEDURE — 99204 OFFICE O/P NEW MOD 45 MIN: CPT | Mod: S$GLB,,, | Performed by: STUDENT IN AN ORGANIZED HEALTH CARE EDUCATION/TRAINING PROGRAM

## 2019-02-26 PROCEDURE — 77077 XR ARTHRITIS SURVEY: ICD-10-PCS | Mod: 26,,, | Performed by: RADIOLOGY

## 2019-02-26 PROCEDURE — 72200 X-RAY EXAM SI JOINTS: CPT | Mod: 26,,, | Performed by: RADIOLOGY

## 2019-02-26 PROCEDURE — 3008F PR BODY MASS INDEX (BMI) DOCUMENTED: ICD-10-PCS | Mod: CPTII,S$GLB,, | Performed by: STUDENT IN AN ORGANIZED HEALTH CARE EDUCATION/TRAINING PROGRAM

## 2019-02-26 PROCEDURE — 3078F PR MOST RECENT DIASTOLIC BLOOD PRESSURE < 80 MM HG: ICD-10-PCS | Mod: CPTII,S$GLB,, | Performed by: STUDENT IN AN ORGANIZED HEALTH CARE EDUCATION/TRAINING PROGRAM

## 2019-02-26 PROCEDURE — 72200 X-RAY EXAM SI JOINTS: CPT | Mod: TC

## 2019-02-26 ASSESSMENT — ROUTINE ASSESSMENT OF PATIENT INDEX DATA (RAPID3)
FATIGUE SCORE: 3
PAIN SCORE: 4
PSYCHOLOGICAL DISTRESS SCORE: 0
AM STIFFNESS SCORE: 0, NO
PATIENT GLOBAL ASSESSMENT SCORE: 5
MDHAQ FUNCTION SCORE: .2
TOTAL RAPID3 SCORE: 3.22

## 2019-02-27 ENCOUNTER — TELEPHONE (OUTPATIENT)
Dept: RHEUMATOLOGY | Facility: CLINIC | Age: 59
End: 2019-02-27

## 2019-02-27 DIAGNOSIS — M19.90 ARTHRITIS: Primary | ICD-10-CM

## 2019-02-27 NOTE — PROGRESS NOTES
Patient seen and examined with fellow.  All elements of history, physical exam and medical decision making independently confirmed by me.  Patient with several years of hand pain that has been evaluated by prior rheumatologist and felt to be osteoarthritis.  Patient continues to complain of pain.  On exam there is noted to have evidence of scaly changes along the hairline.  Will evaluate further.  Concern for possible psoriasis and psoriatic arthritis is raise versus osteoarthritis as the source.  See note for details.

## 2019-03-02 PROBLEM — M79.671 PAIN IN RIGHT FOOT: Status: ACTIVE | Noted: 2019-03-02

## 2019-03-02 PROBLEM — M25.871 PREDISLOCATION SYNDROME OF METATARSOPHALANGEAL JOINT OF RIGHT FOOT: Status: ACTIVE | Noted: 2019-03-02

## 2019-03-07 ENCOUNTER — OFFICE VISIT (OUTPATIENT)
Dept: PODIATRY | Facility: CLINIC | Age: 59
End: 2019-03-07
Payer: COMMERCIAL

## 2019-03-07 VITALS
WEIGHT: 244.69 LBS | SYSTOLIC BLOOD PRESSURE: 131 MMHG | BODY MASS INDEX: 35.03 KG/M2 | HEIGHT: 70 IN | DIASTOLIC BLOOD PRESSURE: 78 MMHG | HEART RATE: 66 BPM

## 2019-03-07 DIAGNOSIS — S99.921D INJURY OF PLANTAR PLATE OF RIGHT FOOT, SUBSEQUENT ENCOUNTER: ICD-10-CM

## 2019-03-07 DIAGNOSIS — M79.671 PAIN IN RIGHT FOOT: ICD-10-CM

## 2019-03-07 DIAGNOSIS — M25.871 PREDISLOCATION SYNDROME OF METATARSOPHALANGEAL JOINT OF RIGHT FOOT: Primary | ICD-10-CM

## 2019-03-07 PROCEDURE — 3078F DIAST BP <80 MM HG: CPT | Mod: CPTII,S$GLB,, | Performed by: PODIATRIST

## 2019-03-07 PROCEDURE — 99213 PR OFFICE/OUTPT VISIT, EST, LEVL III, 20-29 MIN: ICD-10-PCS | Mod: S$GLB,,, | Performed by: PODIATRIST

## 2019-03-07 PROCEDURE — 3075F SYST BP GE 130 - 139MM HG: CPT | Mod: CPTII,S$GLB,, | Performed by: PODIATRIST

## 2019-03-07 PROCEDURE — 3008F PR BODY MASS INDEX (BMI) DOCUMENTED: ICD-10-PCS | Mod: CPTII,S$GLB,, | Performed by: PODIATRIST

## 2019-03-07 PROCEDURE — 3008F BODY MASS INDEX DOCD: CPT | Mod: CPTII,S$GLB,, | Performed by: PODIATRIST

## 2019-03-07 PROCEDURE — 3075F PR MOST RECENT SYSTOLIC BLOOD PRESS GE 130-139MM HG: ICD-10-PCS | Mod: CPTII,S$GLB,, | Performed by: PODIATRIST

## 2019-03-07 PROCEDURE — 99213 OFFICE O/P EST LOW 20 MIN: CPT | Mod: S$GLB,,, | Performed by: PODIATRIST

## 2019-03-07 PROCEDURE — 99999 PR PBB SHADOW E&M-EST. PATIENT-LVL III: CPT | Mod: PBBFAC,,, | Performed by: PODIATRIST

## 2019-03-07 PROCEDURE — 99999 PR PBB SHADOW E&M-EST. PATIENT-LVL III: ICD-10-PCS | Mod: PBBFAC,,, | Performed by: PODIATRIST

## 2019-03-07 PROCEDURE — 3078F PR MOST RECENT DIASTOLIC BLOOD PRESSURE < 80 MM HG: ICD-10-PCS | Mod: CPTII,S$GLB,, | Performed by: PODIATRIST

## 2019-03-07 NOTE — PATIENT INSTRUCTIONS
- Wear supportive shoes such as sneakers with arch supports.    - Look for medium felt metatarsal pads on [employer].    - Look for Superfeet or Powerstep arch supports.    - Rest/reduce ambulation to necessary activities of daily living.    - Ice foot for no more than 20 minutes/per hour.    - Elevate foot as much as possible throughout the day.    - Perform toe taping daily or wear Weil osteotomy strap ([email or web address]) daily.    - Take diclofenac as prescribed.    - Apply OTC topical arnica to affected area for pain relief and to reduce bruising/swelling.    - Look for 's Remedy nail polish and nail polish remover.    - Notify clinic if pain worsens or fails to improve.    - Follow up in 2-4 weeks for foot pain.

## 2019-03-11 ENCOUNTER — TELEPHONE (OUTPATIENT)
Dept: RHEUMATOLOGY | Facility: CLINIC | Age: 59
End: 2019-03-11

## 2019-03-11 DIAGNOSIS — M19.90 ARTHRITIS: Primary | ICD-10-CM

## 2019-03-13 ENCOUNTER — HOSPITAL ENCOUNTER (OUTPATIENT)
Dept: RADIOLOGY | Facility: HOSPITAL | Age: 59
Discharge: HOME | End: 2019-03-13
Attending: STUDENT IN AN ORGANIZED HEALTH CARE EDUCATION/TRAINING PROGRAM
Payer: COMMERCIAL

## 2019-03-13 DIAGNOSIS — M19.90 ARTHRITIS: ICD-10-CM

## 2019-03-13 PROCEDURE — 73220 MRI UPPR EXTREMITY W/O&W/DYE: CPT | Mod: TC,PO,LT

## 2019-03-13 PROCEDURE — 73220 MRI HAND FINGERS W WO CONTRAST LEFT: ICD-10-PCS | Mod: 26,76,LT, | Performed by: RADIOLOGY

## 2019-03-13 PROCEDURE — 73220 MRI UPPR EXTREMITY W/O&W/DYE: CPT | Mod: 26,RT,, | Performed by: RADIOLOGY

## 2019-03-13 PROCEDURE — 73220 MRI HAND FINGERS W WO CONTRAST RIGHT: ICD-10-PCS | Mod: 26,RT,, | Performed by: RADIOLOGY

## 2019-03-13 PROCEDURE — 25500020 PHARM REV CODE 255: Mod: PO | Performed by: STUDENT IN AN ORGANIZED HEALTH CARE EDUCATION/TRAINING PROGRAM

## 2019-03-13 PROCEDURE — 73220 MRI UPPR EXTREMITY W/O&W/DYE: CPT | Mod: 26,76,LT, | Performed by: RADIOLOGY

## 2019-03-13 PROCEDURE — A9585 GADOBUTROL INJECTION: HCPCS | Mod: PO | Performed by: STUDENT IN AN ORGANIZED HEALTH CARE EDUCATION/TRAINING PROGRAM

## 2019-03-13 PROCEDURE — 73220 MRI UPPR EXTREMITY W/O&W/DYE: CPT | Mod: TC,PO,RT

## 2019-03-13 RX ORDER — GADOBUTROL 604.72 MG/ML
10 INJECTION INTRAVENOUS
Status: COMPLETED | OUTPATIENT
Start: 2019-03-13 | End: 2019-03-13

## 2019-03-13 RX ADMIN — GADOBUTROL 10 ML: 604.72 INJECTION INTRAVENOUS at 04:03

## 2019-03-14 ENCOUNTER — PATIENT MESSAGE (OUTPATIENT)
Dept: FAMILY MEDICINE | Facility: CLINIC | Age: 59
End: 2019-03-14

## 2019-03-14 ENCOUNTER — TELEPHONE (OUTPATIENT)
Dept: RHEUMATOLOGY | Facility: CLINIC | Age: 59
End: 2019-03-14

## 2019-03-14 DIAGNOSIS — E03.9 HYPOTHYROIDISM, UNSPECIFIED TYPE: ICD-10-CM

## 2019-03-14 RX ORDER — LEVOTHYROXINE SODIUM 137 UG/1
125 TABLET ORAL
Qty: 90 TABLET | Refills: 0 | Status: ON HOLD | OUTPATIENT
Start: 2019-03-14 | End: 2019-05-08

## 2019-03-14 NOTE — TELEPHONE ENCOUNTER
Called patient to inform her of MRI results which show inflammatory arthritis with degenerative changes.     Patient went to dermatologist - Dr. Norma Oquendo ([place], [place]).  Was told it was psorasis and given topical cream to apply at the affected area.     Case discussed with Dr. Zuniga - will call dermatologist about concern about inflammatory arthritis - PsA (asymmetric hand involvement, absence of RF, +psorasis).   Want to start patient on plaquenil - will inquire if dermatologist if this will worsen skin lesions.  If unable to use, consideration for anti-TNFa agents vs Ustekinumab.

## 2019-03-15 ENCOUNTER — PATIENT MESSAGE (OUTPATIENT)
Dept: RHEUMATOLOGY | Facility: CLINIC | Age: 59
End: 2019-03-15

## 2019-03-16 ENCOUNTER — PATIENT MESSAGE (OUTPATIENT)
Dept: RHEUMATOLOGY | Facility: CLINIC | Age: 59
End: 2019-03-16

## 2019-03-24 PROBLEM — S99.921A INJURY OF PLANTAR PLATE OF RIGHT FOOT: Status: ACTIVE | Noted: 2019-03-24

## 2019-03-25 NOTE — PROGRESS NOTES
Subjective:      Patient ID: Christin Caruso is a 59 y.o. female.    Chief Complaint: Bunions (2 week recheck hieu bunion) and Hammer Toe (2 week recheck hieu hammer toe)      HPI:  Christin Caruso is a 59 y.o. female who presents to clinic with a chief complaint of right foot pain.  Patient reports pain in the ball of her right foot behind her 2nd toe on the bottom has reduced significantly with change in shoe gear and wearing Powerstep inserts.  She rates pain as 1/10 on the pain scale.  She relates adhering more to treatment recommendations.  Patient denies any other pedal complaints at this time.    PCP: Judi Gonsalez MD  Date last seen: 18    Review of Systems   Constitutional: Negative for appetite change, fever, chills, fatigue and unexpected weight change.   Cardiovascular: Negative for chest pain, claudication, and cyanosis.  Positive for leg swelling.  Musculoskeletal: Negative for back pain.  Positive for arthritis, joint pain, joint swelling, myalgias, and stiffness.   Skin: Negative for nail bed changes, discoloration, rash, itching, poor wound healing, and suspicious lesion.  Positive for unusual hair distribution.   Neurological: Negative for loss of balance.  Positive for sensory change, paresthesias, and numbness.   Hematological: Negative for adenopathy, bleeding, and bruising easily.   Psychiatric/Behavioral: The patient is not nervous/anxious.  Negative for altered mental status.    Hemoglobin A1C   Date Value Ref Range Status   10/10/2007 5.7 4.5 - 6.2 % Final   2005 5.4 4.5 - 6.2 % Final       Past Medical History:   Diagnosis Date    Arthritis     Depression     Hypertension     Following with Dr. Craig    Hypothyroidism     MONIKA (obstructive sleep apnea)     uses cpap    Thyroid disease     goiter     Past Surgical History:   Procedure Laterality Date    BUNIONECTOMY      right foot     SECTION      x2    COLONOSCOPY N/A 2016    Performed by [facility] at [facility] ENDO    CYSTOSCOPY WITH RETROGRADE PYELOGRAM Right 2017    [facility]formed by [facility]ers [facility] at [facility] OR    TONSILLECTOMY      URETEROSCOPY Right 2017    Performed by [facility] at [facility] OR     Family History   Problem Relation Age of Onset    Heart disease Mother     Arthritis Mother     Diabetes Father     Heart disease Father     Diabetes Sister     Diabetes Brother     Heart disease Brother     Diabetes Paternal Aunt     Heart disease Maternal Grandfather     Cancer Neg Hx      Social History     Socioeconomic History    Marital status:      Spouse name: None    Number of children: None    Years of education: None    Highest education level: None   Occupational History    None   Social Needs    Financial resource strain: None    Food insecurity:     Worry: None     Inability: None    Transportation needs:     Medical: None     Non-medical: None   Tobacco Use    Smoking status: Former Smoker     Packs/day: 0.15     Years: 4.00     Pack years: 0.60     Types: Cigarettes     Last attempt to quit: 10/1/2012     Years since quittin.4    Smokeless tobacco: Never Used   Substance and Sexual Activity    Alcohol use: Yes     Alcohol/week: 0.6 oz     Types: 1 Glasses of wine per week     Comment: socially    Drug use: No    Sexual activity: Yes     Partners: Male   Lifestyle    Physical activity:     Days per week: None     Minutes per session: None    Stress: None   Relationships    Social connections:     Talks on phone: None     Gets together: None     Attends Rastafarian service: None     Active member of club or organization: None     Attends meetings of clubs or organizations: None     Relationship status: None    Intimate partner violence:     Fear of current or ex partner: None     Emotionally abused: None     Physically abused: None     Forced sexual activity: None   Other Topics Concern    None   Social History Narrative    None          "  Objective:        /78 (BP Location: Right arm, Patient Position: Sitting)   Pulse 66   Ht 5' 10" (1.778 m)   Wt 111 kg (244 lb 11.4 oz)   BMI 35.11 kg/m²     Physical Exam   Constitutional: Patient is oriented to person, place, and time. Patient appears well-developed and well-nourished. No acute distress.     Psychiatric: Patient has a normal mood and affect. Patient's speech is normal and behavior is normal. Judgment is normal. Cognition and memory are normal.     Right pedal exam was performed today.  Vascular: Pedal pulses palpable 2/4 DP & PT.  CFT is = 3 seconds to the hallux.  Skin temperature is warm to cool proximal tibia to distal toes without localized increase in calor noted.  No erythema, edema, or ecchymosis noted to the foot or ankle.  Hair growth decreased distally to the LE.     Musculoskeletal: Ankle joint ROM is decreased. Subtalar joint ROM is decreased.  Midtarsal joint ROM is within normal limits.  1st ray ROM is within normal limits.  1st  MTPJ ROM is decreased.  Ankle joint dorsiflexion is restricted with the knee extended and flexed per Silfverskiold exam.    Muscle strength is 5/5 for all LE muscle groups tested.    Neurological:Epicritic sensation is Decreased to the foot.  Achilles DTR and Chaddock STR are Intact to right lower extremity.  Tenderness to palpation plantarly and ROM of the right 2nd MTPJ.    Dermatological: Toenails 1-5 right are WNL in length and thickness.  Webspaces 1-4 right are clean, dry, and intact.  Skin turgor is supple.  No dry, flaky skin noted to the LE.  No open wounds or suspicious pigmented lesions appreciable to the foot or ankle.    Nursing note and vitals reviewed.        Assessment:       Encounter Diagnoses   Name Primary?   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    - Reviewed with patient supportive, accommodative shoe gear, PRICE therapy, and bracing with Weil osteotomy strap.      - Applied metatarsal pad on Powerstep insert.    Patient was given the following recommendations and instructions:  Patient Instructions   - Wear supportive shoes such as sneakers with arch supports.    - Look for medium felt metatarsal pads on [employer].    - Look for Superfeet or Powerstep arch supports.    - Rest/reduce ambulation to necessary activities of daily living.    - Ice foot for no more than 20 minutes/per hour.    - Elevate foot as much as possible throughout the day.    - Perform toe taping daily or wear Weil osteotomy strap ([email or web address]) daily.    - Take diclofenac as prescribed.    - Apply OTC topical arnica to affected area for pain relief and to reduce bruising/swelling.    - Look for DrMichael's Remedy nail polish and nail polish remover.    - Notify clinic if pain worsens or fails to improve.    - Follow up in 2-4 weeks for foot pain.          Sonja Chaves DPM        Dictation was performed using M*Modal Fluency.  Transcription errors may be present.

## 2019-04-08 ENCOUNTER — TELEPHONE (OUTPATIENT)
Dept: PODIATRY | Facility: CLINIC | Age: 59
End: 2019-04-08

## 2019-04-08 RX ORDER — METHYLPREDNISOLONE 4 MG/1
TABLET ORAL
Qty: 1 PACKAGE | Refills: 0 | Status: SHIPPED | OUTPATIENT
Start: 2019-04-08 | End: 2019-04-29

## 2019-04-08 NOTE — TELEPHONE ENCOUNTER
Pt states on vacation and unable to to leave hotel due to swollen painful foot.  Pt taking votaren tablet. Pt wearing appropriate foot gear. Pt also wearing the arch strap, but having to take it off due to swelling. She is also using bio freeze. Pt wants to be able to walk around, she is in [place]. every time she walks she has to stop to to her foot./

## 2019-04-08 NOTE — TELEPHONE ENCOUNTER
----- Message from Salma Chappell sent at 4/8/2019 10:30 AM CDT -----  Contact: patient  Type: Needs Medical Advice    Who Called:  patient  Symptoms (please be specific):    How long has patient had these symptoms:    Pharmacy name and phone #:    Best Call Back Number: [phone]  Additional Information: requesting a call back,stated right foot is swollen need to know what to do?

## 2019-04-09 ENCOUNTER — TELEPHONE (OUTPATIENT)
Dept: PODIATRY | Facility: CLINIC | Age: 59
End: 2019-04-09

## 2019-04-09 NOTE — TELEPHONE ENCOUNTER
----- Message from Jason Ramon sent at 4/9/2019  3:00 PM CDT -----  Contact: self   Patient want to speak with a nurse regarding some antibiotic Dr. Sorenson call in for patient for her foot, patient see Dr. Chaves Patient said her foot is still swollen and need some advice on should she continue taking antibiotic or not please call back at [phone]

## 2019-04-09 NOTE — TELEPHONE ENCOUNTER
Patient stated that the swelling in her foot has gone down, but she is having pain and swelling in the veins. She looked on line and read that if she has a blood clot she should not take steroids.  Advised her that if she thinks she may have a blood clot she should go to the emergency room.  She stated that she read also that she can take ibuprofen and use warm compresses - which she did and it seemed to help some.  Wants to know if it is normal to have this - swelling and maybe a blood clot -  with hammertoe problems, and if she should stop taking the medrol dose-pack (she only has taken 2 at this time.) and also - what should she do?    Please advise.

## 2019-04-10 ENCOUNTER — TELEPHONE (OUTPATIENT)
Dept: RHEUMATOLOGY | Facility: CLINIC | Age: 59
End: 2019-04-10

## 2019-04-10 ENCOUNTER — PATIENT MESSAGE (OUTPATIENT)
Dept: RHEUMATOLOGY | Facility: CLINIC | Age: 59
End: 2019-04-10

## 2019-04-10 DIAGNOSIS — L40.50 PSORIATIC ARTHRITIS: Primary | ICD-10-CM

## 2019-04-10 RX ORDER — FOLIC ACID 1 MG/1
1 TABLET ORAL DAILY
Qty: 30 TABLET | Refills: 11 | Status: SHIPPED | OUTPATIENT
Start: 2019-04-10 | End: 2019-06-13 | Stop reason: SDUPTHER

## 2019-04-10 RX ORDER — METHOTREXATE 2.5 MG/1
10 TABLET ORAL
Qty: 16 TABLET | Refills: 3 | Status: SHIPPED | OUTPATIENT
Start: 2019-04-10 | End: 2019-06-13 | Stop reason: SDUPTHER

## 2019-04-10 NOTE — TELEPHONE ENCOUNTER
Received message from patient's dermatologist - would prefer that we start patient on MTX instead of plaquenil (because of concern that it can worsen the psoriasis).      Case discussed with Dr. Zuniga, will start patient on MTX 10mg Qweekly with folic acid daily.     Called patient to discuss this with patient and inform her to have labs complete prior to the next visit.  Medication send to pharmacy on file.     Patient messaged with ACR link to MTX medication information.  All questions answered.

## 2019-04-15 ENCOUNTER — OFFICE VISIT (OUTPATIENT)
Dept: PODIATRY | Facility: CLINIC | Age: 59
End: 2019-04-15
Payer: COMMERCIAL

## 2019-04-15 VITALS
SYSTOLIC BLOOD PRESSURE: 142 MMHG | DIASTOLIC BLOOD PRESSURE: 85 MMHG | WEIGHT: 244.69 LBS | HEART RATE: 83 BPM | HEIGHT: 70 IN | BODY MASS INDEX: 35.03 KG/M2

## 2019-04-15 DIAGNOSIS — M19.071 PRIMARY OSTEOARTHRITIS OF RIGHT FOOT: ICD-10-CM

## 2019-04-15 DIAGNOSIS — M25.871 PREDISLOCATION SYNDROME OF METATARSOPHALANGEAL JOINT OF RIGHT FOOT: Primary | ICD-10-CM

## 2019-04-15 DIAGNOSIS — S93.306A: ICD-10-CM

## 2019-04-15 DIAGNOSIS — M79.671 PAIN IN RIGHT FOOT: ICD-10-CM

## 2019-04-15 DIAGNOSIS — M20.41 HAMMER TOE OF RIGHT FOOT: ICD-10-CM

## 2019-04-15 DIAGNOSIS — S99.921D INJURY OF PLANTAR PLATE OF RIGHT FOOT, SUBSEQUENT ENCOUNTER: ICD-10-CM

## 2019-04-15 PROCEDURE — 99213 OFFICE O/P EST LOW 20 MIN: CPT | Mod: S$GLB,,, | Performed by: PODIATRIST

## 2019-04-15 PROCEDURE — 3077F SYST BP >= 140 MM HG: CPT | Mod: CPTII,S$GLB,, | Performed by: PODIATRIST

## 2019-04-15 PROCEDURE — 3008F PR BODY MASS INDEX (BMI) DOCUMENTED: ICD-10-PCS | Mod: CPTII,S$GLB,, | Performed by: PODIATRIST

## 2019-04-15 PROCEDURE — 99213 PR OFFICE/OUTPT VISIT, EST, LEVL III, 20-29 MIN: ICD-10-PCS | Mod: S$GLB,,, | Performed by: PODIATRIST

## 2019-04-15 PROCEDURE — 3008F BODY MASS INDEX DOCD: CPT | Mod: CPTII,S$GLB,, | Performed by: PODIATRIST

## 2019-04-15 PROCEDURE — 3077F PR MOST RECENT SYSTOLIC BLOOD PRESSURE >= 140 MM HG: ICD-10-PCS | Mod: CPTII,S$GLB,, | Performed by: PODIATRIST

## 2019-04-15 PROCEDURE — 3079F PR MOST RECENT DIASTOLIC BLOOD PRESSURE 80-89 MM HG: ICD-10-PCS | Mod: CPTII,S$GLB,, | Performed by: PODIATRIST

## 2019-04-15 PROCEDURE — 99999 PR PBB SHADOW E&M-EST. PATIENT-LVL III: ICD-10-PCS | Mod: PBBFAC,,, | Performed by: PODIATRIST

## 2019-04-15 PROCEDURE — 3079F DIAST BP 80-89 MM HG: CPT | Mod: CPTII,S$GLB,, | Performed by: PODIATRIST

## 2019-04-15 PROCEDURE — 99999 PR PBB SHADOW E&M-EST. PATIENT-LVL III: CPT | Mod: PBBFAC,,, | Performed by: PODIATRIST

## 2019-04-15 NOTE — PATIENT INSTRUCTIONS
- Wear supportive shoes such as sneakers with arch supports.    - Look for medium felt metatarsal pads on [employer].    - Look for Superfeet or Powerstep arch supports.    - Rest/reduce ambulation to necessary activities of daily living.    - Ice foot for no more than 20 minutes/per hour.    - Elevate foot as much as possible throughout the day.    - Perform toe taping daily or wear Weil osteotomy strap ([email or web address]) daily.    - Take diclofenac as prescribed up to 4x daily.    - Apply OTC topical arnica to affected area for pain relief and to reduce bruising/swelling up to 3x daily.    - Look for Jobst compression socks 15-20 mmHg on [employer].    - Notify clinic if pain worsens or fails to improve.    - Follow up in 2-4 weeks for foot pain.

## 2019-04-16 PROBLEM — S93.306A: Status: ACTIVE | Noted: 2019-04-16

## 2019-04-16 PROBLEM — M20.41 HAMMER TOE OF RIGHT FOOT: Status: ACTIVE | Noted: 2019-04-16

## 2019-04-16 PROBLEM — M19.071 PRIMARY OSTEOARTHRITIS OF RIGHT FOOT: Status: ACTIVE | Noted: 2019-04-16

## 2019-04-16 RX ORDER — DICLOFENAC SODIUM 10 MG/G
2 GEL TOPICAL 4 TIMES DAILY
Qty: 100 G | Refills: 2 | Status: SHIPPED | OUTPATIENT
Start: 2019-04-16 | End: 2019-05-07

## 2019-04-17 ENCOUNTER — TELEPHONE (OUTPATIENT)
Dept: RHEUMATOLOGY | Facility: CLINIC | Age: 59
End: 2019-04-17

## 2019-04-17 NOTE — TELEPHONE ENCOUNTER
Received message from patient regarding MTX side effects.    Patient has family history of melanoma and concern about usage with MTX (and sun exposure).  Spoke with Dr. Cortez - MTX has the least effect on cancer than other medications (imuran and TNFi).  He agrees with the usage of MTX for PsA.  Recommended patient to have regular check up with dermatologist for evaluation of melanoma and regular usage of sunprotection (sunscreen, hats, and protective wear).    Explained to patient about EtOH and MTX - increase liver toxicity.  She is a social drinker occasionally.     Patient express understanding.  Has only taken 1 dose of MTX (Monday) and tolerating it well at this time.  Patient will update me if she does not tolerate medication or have any questions.

## 2019-04-22 NOTE — PROGRESS NOTES
Subjective:      Patient ID: Christin Caruso is a 59 y.o. female.    Chief Complaint: Foot Problem (right foot swollen   PCP   Judi Gonsalez  9/25/18  ENDO  [place]  12/28/18)      HPI:  Christin Caruso is a 59 y.o. female who presents to clinic with a chief complaint of right foot pain.  Patient reports pain in the ball of her right foot flared while walking around [place] when wearing shoes without inserts or metatarsal pads.  She states that she walked around for 1 day through the airport and in [place] then had to spend the following 2 days in her hotel room with her feet propped up.  She rates pain as 4/10 on the pain scale when she was walking around this morning barefoot but rates no pain now.  She relates pain was 8/10 when in [place].  She informs improvement occurred with following previous treatment recommendations.  Patient denies any other pedal complaints at this time.    PCP: Judi Gonsalez MD  Date last seen: 9/25/18    Review of Systems   Constitutional: Negative for appetite change, fever, chills, fatigue and unexpected weight change.   Cardiovascular: Negative for chest pain, claudication, and cyanosis.  Positive for leg swelling.  Musculoskeletal: Negative for back pain.  Positive for arthritis, joint pain, joint swelling, myalgias, and stiffness.   Skin: Negative for nail bed changes, discoloration, rash, itching, poor wound healing, and suspicious lesion.  Positive for unusual hair distribution.   Neurological: Negative for loss of balance.  Positive for sensory change, paresthesias, and numbness.   Hematological: Negative for adenopathy, bleeding, and bruising easily.   Psychiatric/Behavioral: The patient is not nervous/anxious.  Negative for altered mental status.    Hemoglobin A1C   Date Value Ref Range Status   10/10/2007 5.7 4.5 - 6.2 % Final   03/01/2005 5.4 4.5 - 6.2 % Final       Past Medical History:   Diagnosis Date    Arthritis     Depression     Hypertension     Following with   Presser    Hypothyroidism     MONIKA (obstructive sleep apnea)     uses cpap    Thyroid disease     goiter     Past Surgical History:   Procedure Laterality Date    BUNIONECTOMY      right foot     SECTION      x2    COLONOSCOPY N/A 2016    [facility]    CYSTOSCOPY WITH RETROGRADE PYELOGRAM Right 2017    [facility] Jeffers [facility] at [facility] OR    TONSILLECTOMY      URETEROSCOPY Right 2017    [facility] at [facility] OR     Family History   Problem Relation Age of Onset    Heart disease Mother     Arthritis Mother     Diabetes Father     Heart disease Father     Diabetes Sister     Diabetes Brother     Heart disease Brother     Diabetes Paternal Aunt     Heart disease Maternal Grandfather     Cancer Neg Hx      Social History     Socioeconomic History    Marital status:      Spouse name: Not on file    Number of children: Not on file    Years of education: Not on file    Highest education level: Not on file   Occupational History    Not on file   Social Needs    Financial resource strain: Not on file    Food insecurity:     Worry: Not on file     Inability: Not on file    Transportation needs:     Medical: Not on file     Non-medical: Not on file   Tobacco Use    Smoking status: Former Smoker     Packs/day: 0.15     Years: 4.00     Pack years: 0.60     Types: Cigarettes     Last attempt to quit: 10/1/2012     Years since quittin.5    Smokeless tobacco: Never Used   Substance and Sexual Activity    Alcohol use: Yes     Alcohol/week: 0.6 oz     Types: 1 Glasses of wine per week     Comment: socially    Drug use: No    Sexual activity: Yes     Partners: Male   Lifestyle    Physical activity:     Days per week: Not on file     Minutes per session: Not on file    Stress: Not on file   Relationships    Social connections:     Talks on phone: Not on file     Gets together: Not on file     Attends  "Rastafarian service: Not on file     Active member of club or organization: Not on file     Attends meetings of clubs or organizations: Not on file     Relationship status: Not on file   Other Topics Concern    Not on file   Social History Narrative    Not on file           Objective:        BP (!) 142/85 (BP Location: Left arm, Patient Position: Sitting)   Pulse 83   Ht 5' 10" (1.778 m)   Wt 111 kg (244 lb 11.4 oz)   BMI 35.11 kg/m²     Physical Exam   Constitutional: Patient is oriented to person, place, and time. Patient appears well-developed and well-nourished. No acute distress.     Psychiatric: Patient has a normal mood and affect. Patient's speech is normal and behavior is normal. Judgment is normal. Cognition and memory are normal.     Right pedal exam was performed today.  Vascular: Pedal pulses palpable 2/4 DP & PT.  CFT is = 3 seconds to the hallux.  Skin temperature is warm to cool proximal tibia to distal toes without localized increase in calor noted.  Localized edema noted to the foot.  No erythema or ecchymosis noted to the foot or ankle.  Hair growth decreased distally to the LE.     Musculoskeletal: Ankle joint ROM is decreased. Subtalar joint ROM is decreased.  Midtarsal joint ROM is within normal limits.  1st ray ROM is within normal limits.  1st  MTPJ ROM is decreased.  Ankle joint dorsiflexion is restricted with the knee extended and flexed per Silf[place] exam.    Muscle strength is 5/5 for all LE muscle groups tested.    Neurological:  Epicritic sensation is Decreased to the foot.  [place] STR is negative to the lower extremity.  Tenderness to palpation plantarly and ROM of the right 2nd MTPJ.    Dermatological: Toenails 1-5 are WNL in length and thickness.  Webspaces 1-4 are clean, dry, and intact.  Skin turgor is increased.  No dry, flaky skin noted to the LE.  No open wounds or suspicious pigmented lesions appreciable to the foot or ankle.    Nursing note and vitals reviewed.      "   Assessment:       Encounter Diagnoses   Name Primary?   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    Injury of plantar plate of right foot, subsequent encounter - Right Foot  -     diclofenac sodium (VOLTAREN) 1 % Gel; Apply 2 g topically 4 (four) times daily.    Pain in right foot - Right Foot  -     diclofenac sodium (VOLTAREN) 1 % Gel; Apply 2 g topically 4 (four) times daily.    Subluxation of metatarsal - Right Foot  -     diclofenac sodium (VOLTAREN) 1 % Gel; Apply 2 g topically 4 (four) times daily.    Hammer toe of right foot - Right Foot    Primary osteoarthritis of right foot - Right Foot      I counseled the patient on her conditions, their implications and medical management.    - Reviewed with patient supportive, accommodative shoe gear, PRICE therapy, and metatarsal padding.      - Applied metatarsal pad on Powerstep insert.    Patient was given the following recommendations and instructions:  Patient Instructions   - Wear supportive shoes such as sneakers with arch supports.    - Look for medium felt metatarsal pads on [employer].    - Look for Superfeet or Powerstep arch supports.    - Rest/reduce ambulation to necessary activities of daily living.    - Ice foot for no more than 20 minutes/per hour.    - Elevate foot as much as possible throughout the day.    - Perform toe taping daily or wear Weil osteotomy strap ([employer])  daily.    - Take diclofenac as prescribed up to 4x daily.    - Apply OTC topical arnica to affected area for pain relief and to reduce bruising/swelling up to 3x daily.    - Look for Jobst compression socks 15-20 mmHg on [employer].    - Notify clinic if pain worsens or fails to improve.    - Follow up in 2-4 weeks for foot pain.          Sonja Chaves DPM        Dictation was performed using M*Modal Fluency.  Transcription errors may be present.

## 2019-04-25 ENCOUNTER — LAB VISIT (OUTPATIENT)
Dept: LAB | Facility: HOSPITAL | Age: 59
End: 2019-04-25
Attending: STUDENT IN AN ORGANIZED HEALTH CARE EDUCATION/TRAINING PROGRAM
Payer: COMMERCIAL

## 2019-04-25 DIAGNOSIS — L40.50 PSORIATIC ARTHRITIS: ICD-10-CM

## 2019-04-25 LAB
ALBUMIN SERPL BCP-MCNC: 3.8 G/DL (ref 3.5–5.2)
ALP SERPL-CCNC: 74 U/L (ref 55–135)
ALT SERPL W/O P-5'-P-CCNC: 33 U/L (ref 10–44)
ANION GAP SERPL CALC-SCNC: 9 MMOL/L (ref 8–16)
AST SERPL-CCNC: 20 U/L (ref 10–40)
BASOPHILS # BLD AUTO: 0.02 K/UL (ref 0–0.2)
BASOPHILS NFR BLD: 0.3 % (ref 0–1.9)
BILIRUB SERPL-MCNC: 0.6 MG/DL (ref 0.1–1)
BUN SERPL-MCNC: 17 MG/DL (ref 6–20)
CALCIUM SERPL-MCNC: 9.2 MG/DL (ref 8.7–10.5)
CHLORIDE SERPL-SCNC: 105 MMOL/L (ref 95–110)
CO2 SERPL-SCNC: 29 MMOL/L (ref 23–29)
CREAT SERPL-MCNC: 0.7 MG/DL (ref 0.5–1.4)
DIFFERENTIAL METHOD: ABNORMAL
EOSINOPHIL # BLD AUTO: 0.1 K/UL (ref 0–0.5)
EOSINOPHIL NFR BLD: 2 % (ref 0–8)
ERYTHROCYTE [DISTWIDTH] IN BLOOD BY AUTOMATED COUNT: 13.2 % (ref 11.5–14.5)
EST. GFR  (AFRICAN AMERICAN): >60 ML/MIN/1.73 M^2
EST. GFR  (NON AFRICAN AMERICAN): >60 ML/MIN/1.73 M^2
GLUCOSE SERPL-MCNC: 102 MG/DL (ref 70–110)
HCT VFR BLD AUTO: 41.9 % (ref 37–48.5)
HGB BLD-MCNC: 13.3 G/DL (ref 12–16)
IMM GRANULOCYTES # BLD AUTO: 0.01 K/UL (ref 0–0.04)
IMM GRANULOCYTES NFR BLD AUTO: 0.2 % (ref 0–0.5)
LYMPHOCYTES # BLD AUTO: 2.1 K/UL (ref 1–4.8)
LYMPHOCYTES NFR BLD: 32.7 % (ref 18–48)
MCH RBC QN AUTO: 29.4 PG (ref 27–31)
MCHC RBC AUTO-ENTMCNC: 31.7 G/DL (ref 32–36)
MCV RBC AUTO: 93 FL (ref 82–98)
MONOCYTES # BLD AUTO: 0.4 K/UL (ref 0.3–1)
MONOCYTES NFR BLD: 6.3 % (ref 4–15)
NEUTROPHILS # BLD AUTO: 3.8 K/UL (ref 1.8–7.7)
NEUTROPHILS NFR BLD: 58.5 % (ref 38–73)
NRBC BLD-RTO: 0 /100 WBC
PLATELET # BLD AUTO: 272 K/UL (ref 150–350)
PMV BLD AUTO: 11.1 FL (ref 9.2–12.9)
POTASSIUM SERPL-SCNC: 4.5 MMOL/L (ref 3.5–5.1)
PROT SERPL-MCNC: 6.7 G/DL (ref 6–8.4)
RBC # BLD AUTO: 4.53 M/UL (ref 4–5.4)
SODIUM SERPL-SCNC: 143 MMOL/L (ref 136–145)
WBC # BLD AUTO: 6.48 K/UL (ref 3.9–12.7)

## 2019-04-25 PROCEDURE — 85025 COMPLETE CBC W/AUTO DIFF WBC: CPT

## 2019-04-25 PROCEDURE — 36415 COLL VENOUS BLD VENIPUNCTURE: CPT | Mod: PO

## 2019-04-25 PROCEDURE — 80053 COMPREHEN METABOLIC PANEL: CPT

## 2019-05-06 ENCOUNTER — OFFICE VISIT (OUTPATIENT)
Dept: URGENT CARE | Facility: CLINIC | Age: 59
End: 2019-05-06
Payer: COMMERCIAL

## 2019-05-06 VITALS
WEIGHT: 244 LBS | TEMPERATURE: 96 F | SYSTOLIC BLOOD PRESSURE: 155 MMHG | RESPIRATION RATE: 16 BRPM | BODY MASS INDEX: 34.93 KG/M2 | HEIGHT: 70 IN | OXYGEN SATURATION: 96 % | DIASTOLIC BLOOD PRESSURE: 81 MMHG | HEART RATE: 76 BPM

## 2019-05-06 DIAGNOSIS — R06.02 SOB (SHORTNESS OF BREATH): Primary | ICD-10-CM

## 2019-05-06 PROCEDURE — 3077F SYST BP >= 140 MM HG: CPT | Mod: CPTII,S$GLB,, | Performed by: FAMILY MEDICINE

## 2019-05-06 PROCEDURE — 3008F PR BODY MASS INDEX (BMI) DOCUMENTED: ICD-10-PCS | Mod: CPTII,S$GLB,, | Performed by: FAMILY MEDICINE

## 2019-05-06 PROCEDURE — 99214 OFFICE O/P EST MOD 30 MIN: CPT | Mod: S$GLB,,, | Performed by: FAMILY MEDICINE

## 2019-05-06 PROCEDURE — 71046 XR CHEST PA AND LATERAL: ICD-10-PCS | Mod: S$GLB,,, | Performed by: RADIOLOGY

## 2019-05-06 PROCEDURE — 99214 PR OFFICE/OUTPT VISIT, EST, LEVL IV, 30-39 MIN: ICD-10-PCS | Mod: S$GLB,,, | Performed by: FAMILY MEDICINE

## 2019-05-06 PROCEDURE — 71046 X-RAY EXAM CHEST 2 VIEWS: CPT | Mod: S$GLB,,, | Performed by: RADIOLOGY

## 2019-05-06 PROCEDURE — 93010 EKG 12-LEAD: ICD-10-PCS | Mod: S$GLB,,, | Performed by: INTERNAL MEDICINE

## 2019-05-06 PROCEDURE — 3079F DIAST BP 80-89 MM HG: CPT | Mod: CPTII,S$GLB,, | Performed by: FAMILY MEDICINE

## 2019-05-06 PROCEDURE — 93005 EKG 12-LEAD: ICD-10-PCS | Mod: S$GLB,,, | Performed by: FAMILY MEDICINE

## 2019-05-06 PROCEDURE — 93010 ELECTROCARDIOGRAM REPORT: CPT | Mod: S$GLB,,, | Performed by: INTERNAL MEDICINE

## 2019-05-06 PROCEDURE — 93005 ELECTROCARDIOGRAM TRACING: CPT | Mod: S$GLB,,, | Performed by: FAMILY MEDICINE

## 2019-05-06 PROCEDURE — 3008F BODY MASS INDEX DOCD: CPT | Mod: CPTII,S$GLB,, | Performed by: FAMILY MEDICINE

## 2019-05-06 PROCEDURE — 3077F PR MOST RECENT SYSTOLIC BLOOD PRESSURE >= 140 MM HG: ICD-10-PCS | Mod: CPTII,S$GLB,, | Performed by: FAMILY MEDICINE

## 2019-05-06 PROCEDURE — 3079F PR MOST RECENT DIASTOLIC BLOOD PRESSURE 80-89 MM HG: ICD-10-PCS | Mod: CPTII,S$GLB,, | Performed by: FAMILY MEDICINE

## 2019-05-06 RX ORDER — DOXYCYCLINE 100 MG/1
100 CAPSULE ORAL 2 TIMES DAILY
Qty: 20 CAPSULE | Refills: 0 | Status: SHIPPED | OUTPATIENT
Start: 2019-05-06 | End: 2019-05-07

## 2019-05-06 NOTE — PROGRESS NOTES
RHEUMATOLOGY CLINIC INITIAL VISIT        Chief complaints:- PsA follow up       HPI:-  Christin Proctor a 59 y.o. pleasant female with history of Hashimotos thyroiditis, OA, DDD, recently diagnosed psorasis, and PsA present to the clinic for follow up      Patient seen Dr. Calle in 2012 for evaluation of multiple joints (knees, hips, and hand) pain which started in 2009.  Labs were significant for +NIESHA (1:160 homogenous) with negative pattern.  Patient then went to Dr. Brown in Feb 2016 for multiple joint pain that was alleviated with Alleve.      Started around 2010 - where patient started to feel R 3rd DIP joint pain.  Achy pain with movement.  Went to OT previously and was helping with the ROM.  Alleve was alleviating symptoms.  Went to Dr. Calle for evaluation in 2012.    In 2016 - symptoms persist and worsen, went to Dr. Brown in 2016 for evaluation.  Alleve still alleviated the symptom.  Pain spreading to the 2nd and 4th digits. PIP and DIP along with 3rd PIP joint.      Sept 2018, patient notice spreading of symptoms to entire R hand.  Now L hand symptoms.   Am stiffness < 5 minutes (back, hips, and legs).  Heat and alleve alleviates symptoms.     Previously had R bunion and hammer toes s/p surgery.  Hammer toes failed and podiatry trying to amend it non-surgically.  On diclofenac which alleviated symptoms of the foot but not the hand.     Family history - hand OA (mother and sister)     Former smoker 1/2ppd x 5 years. Quit 5 years ago.  Social drinker and denies any recreational drugs/medication usage.     Interval history    At the last visit, patient was found to rash consistent with psoriasis around hairline and scalp.  Patient evaluated by dermatologist and diagnosed with psoriasis - treated with topical treatment.  There was complete resolution of psoriasis.  Started to take MTX 10mg ~1 month ago (April 2019).  Tolerated medication without any  "complications.  Feels significant improvement of all joint pain/swelling.     Currently complaining of chest discomfort in the substernal region that started two days prior.  Went to urgent care for evaluation, CXR was performed and was negative.  No blood work.  Dx with anxiety.  Pain is localized to the substernal region, constant pressure like ("feels like an elephant is sitting on her").  Does not radiate.  Nothing makes it better or worse.  Associated with mild SOB.  No similar symptoms.  Not associated with diaphoresis, N/V.  + stressors in her life (son in rehab).  Family history of cardiac disease - father (dx with cardiomyopathy- in the 80s).      Denies rash, mouth ulcers, sicca syndrome, Raynaud's, headaches, myalgia, unintentional weight loss, N/V, fever/chills, abdominal pain, CP, SOB, urinary/bowel symptoms, dysphagia.      Review of Systems   Constitutional: Negative for chills, diaphoresis, fever, malaise/fatigue and weight loss.   HENT: Negative for congestion, ear discharge, ear pain, hearing loss, nosebleeds, sinus pain and tinnitus.    Eyes: Negative for photophobia, pain, discharge and redness.   Respiratory: Positive for shortness of breath. Negative for cough, hemoptysis, sputum production, wheezing and stridor.    Cardiovascular: Positive for chest pain. Negative for palpitations, orthopnea, claudication, leg swelling and PND.   Gastrointestinal: Negative for abdominal pain, constipation, diarrhea, heartburn, nausea and vomiting.   Genitourinary: Negative for dysuria, frequency, hematuria and urgency.   Musculoskeletal: Negative for back pain, joint pain, myalgias and neck pain.   Skin: Negative for rash.   Neurological: Negative for dizziness, tingling, tremors, weakness and headaches.   Endo/Heme/Allergies: Does not bruise/bleed easily.   Psychiatric/Behavioral: Negative for depression, hallucinations and suicidal ideas. The patient is not nervous/anxious and does not have insomnia.  " "      Past Medical History:   Diagnosis Date    Arthritis     Depression     Hypertension     Following with Dr. Craig    Hypothyroidism     MONIKA (obstructive sleep apnea)     uses cpap    Thyroid disease     goiter       Past Surgical History:   Procedure Laterality Date    BUNIONECTOMY      right foot     SECTION      x2    COLONOSCOPY N/A 2016    Performed [facility] Aj [facility]itt Jr[facility] MD at [facility] ENDO    CYSTOSCOPY WITH RETROGRADE PYELOGRAM Right 2017    [facility] by [facility]KA Jeffers MD at [facility] OR    TONSILLECTOMY      URETEROSCOPY Right 2017    Performed by [facility]KA Jeffers MD at [facility] OR        Social History     Tobacco Use    Smoking status: Former Smoker     Packs/day: 0.15     Years: 4.00     Pack years: 0.60     Types: Cigarettes     Last attempt to quit: 10/1/2012     Years since quittin.6    Smokeless tobacco: Never Used   Substance Use Topics    Alcohol use: Yes     Alcohol/week: 0.6 oz     Types: 1 Glasses of wine per week     Comment: socially    Drug use: No       Family History   Problem Relation Age of Onset    Heart disease Mother     Arthritis Mother     Diabetes Father     Heart disease Father     Diabetes Sister     Diabetes Brother     Heart disease Brother     Diabetes Paternal Aunt     Heart disease Maternal Grandfather     Cancer Neg Hx        Review of patient's allergies indicates:   Allergen Reactions    [place] Other (See Comments)     Tongue swells, angiodema           Physical examination:-    Vitals:    19 0900   BP: (!) 153/82   Pulse: 61   Weight: 112.9 kg (248 lb 14.4 oz)   Height: 5' 10" (1.778 m)   PainSc:   7       Physical Exam   Constitutional: She is oriented to person, place, and time and well-developed, well-nourished, and in no distress.   HENT:   Head: Normocephalic and atraumatic.   No mouth ulcers    Eyes: Pupils are equal, round, and reactive to light. EOM are normal.   Neck: Normal range of motion. Neck " supple.   Cardiovascular: Normal rate, regular rhythm and normal heart sounds.   Pulmonary/Chest: Effort normal and breath sounds normal.   Non reproducible chest pain with   Abdominal: Soft. Bowel sounds are normal.   Musculoskeletal: Normal range of motion. She exhibits edema and tenderness. She exhibits no deformity.   +CMC in bilateral 1st digit  +bony formation of R 3rd and 4th PIP and DIP  + synovitis of R 3rd MCP  Bilateral  strength intact   ROM intact in all other joints  +neuropathy of bilateral LE up to thigh   Neurological: She is alert and oriented to person, place, and time. Gait normal. GCS score is 15.   Skin: Skin is warm and dry. No rash noted. No erythema.   Bilateral temporal erythematous hairline rash resolved.     Psychiatric: Mood, memory, affect and judgment normal.       Labs:-  Results for JOELLE RICHARDS (MRN [id number]) as of 2/26/2019 11:36   Ref. Range 6/28/2018 23:49 7/26/2018 00:00 9/25/2018 11:51 1/29/2019 09:06 2/21/2019 10:34   Sodium Latest Ref Range: 136 - 145 mmol/L    144    Potassium Latest Ref Range: 3.5 - 5.1 mmol/L    4.3    Chloride Latest Ref Range: 95 - 110 mmol/L    108    CO2 Latest Ref Range: 23 - 29 mmol/L    26    Anion Gap Latest Ref Range: 8 - 16 mmol/L    10    BUN, Bld Latest Ref Range: 6 - 20 mg/dL    22 (H)    Creatinine Latest Ref Range: 0.5 - 1.4 mg/dL    0.7    eGFR if non African American Latest Ref Range: >60 mL/min/1.73 m^2    >60.0    eGFR if African American Latest Ref Range: >60 mL/min/1.73 m^2    >60.0    Glucose Latest Ref Range: 70 - 110 mg/dL    100    Calcium Latest Ref Range: 8.7 - 10.5 mg/dL    9.2    Alkaline Phosphatase Latest Ref Range: 55 - 135 U/L    64    Total Protein Latest Ref Range: 6.0 - 8.4 g/dL    6.9    Albumin Latest Ref Range: 3.5 - 5.2 g/dL    3.8    Total Bilirubin Latest Ref Range: 0.1 - 1.0 mg/dL    0.5    AST Latest Ref Range: 10 - 40 U/L    18    ALT Latest Ref Range: 10 - 44 U/L    22    Vit D, 25-Hydroxy Latest Ref Range:  30 - 96 ng/mL    26 (L)    Insulin Latest Ref Range: <25.0 uU/mL    11.8    Insulin Collection Interval Unknown    0    TSH Latest Ref Range: 0.400 - 4.000 uIU/mL   5.981 (H) 2.398    Free T4 Latest Ref Range: 0.71 - 1.51 ng/dL   1.00 1.04        Radiographs:-  Independent visualization of images done.   12/2014 R shoulder - R AC joint DJD. R supraspinatus and infraspinatus calcific tenonitis  1/2016  Bilateral hand - DJD of the hand. No erosions  1/2016 Lumbar spine - intervertebral disk height loss   2/2019 Arthritis survey - no erosions  2/2019 - SI joint xray - no fraction/dislocation/erosions  2/2019 - MRI hand - cortical erosion    Medication List with Changes/Refills   Current Medications    AA/PROT/LYSINE/METHIO/VIT C/B6 (A/G PRO ORAL)    Take by mouth.    BIOTIN 1 MG TABLET    Take 1,000 mcg by mouth once daily.     DICLOFENAC (VOLTAREN) 75 MG EC TABLET    Take 75 mg by mouth 2 (two) times daily.    DICLOFENAC SODIUM (VOLTAREN) 1 % GEL    Apply 2 g topically 4 (four) times daily.    DOXYCYCLINE (VIBRAMYCIN) 100 MG CAP    Take 1 capsule (100 mg total) by mouth 2 (two) times daily. for 10 days    EPINEPHRINE (EPIPEN) 0.3 MG/0.3 ML (1:1,000) ATIN    Inject 0.3 mLs (0.3 mg total) into the muscle once.    FOLIC ACID (FOLVITE) 1 MG TABLET    Take 1 tablet (1 mg total) by mouth once daily.    METHOTREXATE 2.5 MG TAB    Take 4 tablets (10 mg total) by mouth every 7 days.    MULTIVITAMIN (DAILY MULTI-VITAMIN) PER TABLET    Every day    SYNTHROID 137 MCG TAB TABLET    Take 1 tablet (137 mcg total) by mouth before breakfast.    VITAMIN D 1000 UNITS TAB    Take 1,000 Units by mouth once daily.       Assessment/Plans:-  59 y.o. pleasant female with history of Hashimotos thyroiditis, OA, and DDD, psoriasis, and PsA present to the clinic for follow up.      Resolution of psoriasis.  MTX 10mg weekly and daily folic acid alleviated all joint pain.     Previous blood work was +NIESHA (1:160 homogenous) with negative  profile.    MRI showed cortical erosion - concern for inflammatory arthritis.  Given +psorasis (dx and managed by dermatology, patient most likely have PsA).    Assessment  - PsA - stable.  On MTX 10mg Qweekly and daily folic acid  - Psoarasis - resolved.  Following dermatology  - New onset of chest discomfort - anxiety vs acs?  Will need PCP follow up   - Anxiety/depression - a lot of stressors right now  - Multiple joints OA - stable.    - Bunion/hammer toes - following up with podiatry.  - Chronic usage of immunosuppression medication - Monitoring labs Q3 months       Plan  - CBC, CMP, ESR/CRP  - Continue MTX 10mg Qweekly and folic acid daily  - Continue to follow up with dermatology as previously schedule  - Informed patient to notify PCP about chest discomfort immediately. PCP messaged via Epic about chest discomfort  - Education provided to patient about notifying us if joint pain worsen - will do labs and potentially increase MTX to 6 tablets.   - Follow up with podiatry as scheduled        # RTC in 3 months

## 2019-05-07 ENCOUNTER — OFFICE VISIT (OUTPATIENT)
Dept: RHEUMATOLOGY | Facility: CLINIC | Age: 59
End: 2019-05-07
Payer: COMMERCIAL

## 2019-05-07 VITALS
WEIGHT: 248.88 LBS | HEIGHT: 70 IN | SYSTOLIC BLOOD PRESSURE: 153 MMHG | DIASTOLIC BLOOD PRESSURE: 82 MMHG | BODY MASS INDEX: 35.63 KG/M2 | HEART RATE: 61 BPM

## 2019-05-07 DIAGNOSIS — M15.9 GENERALIZED OSTEOARTHRITIS: Primary | ICD-10-CM

## 2019-05-07 DIAGNOSIS — L40.9 PSORIASIS: ICD-10-CM

## 2019-05-07 DIAGNOSIS — R07.9 CHEST PAIN, UNSPECIFIED TYPE: ICD-10-CM

## 2019-05-07 DIAGNOSIS — L40.50 PSA (PSORIATIC ARTHRITIS): ICD-10-CM

## 2019-05-07 DIAGNOSIS — M19.042 OSTEOARTHRITIS OF FINGERS OF HANDS, BILATERAL: ICD-10-CM

## 2019-05-07 DIAGNOSIS — M19.041 OSTEOARTHRITIS OF FINGERS OF HANDS, BILATERAL: ICD-10-CM

## 2019-05-07 PROBLEM — I21.4 NSTEMI (NON-ST ELEVATED MYOCARDIAL INFARCTION): Status: ACTIVE | Noted: 2019-05-07

## 2019-05-07 PROCEDURE — 99214 OFFICE O/P EST MOD 30 MIN: CPT | Mod: S$GLB,,, | Performed by: STUDENT IN AN ORGANIZED HEALTH CARE EDUCATION/TRAINING PROGRAM

## 2019-05-07 PROCEDURE — 99214 PR OFFICE/OUTPT VISIT, EST, LEVL IV, 30-39 MIN: ICD-10-PCS | Mod: S$GLB,,, | Performed by: STUDENT IN AN ORGANIZED HEALTH CARE EDUCATION/TRAINING PROGRAM

## 2019-05-07 PROCEDURE — 3074F SYST BP LT 130 MM HG: CPT | Mod: CPTII,S$GLB,, | Performed by: STUDENT IN AN ORGANIZED HEALTH CARE EDUCATION/TRAINING PROGRAM

## 2019-05-07 PROCEDURE — 3008F PR BODY MASS INDEX (BMI) DOCUMENTED: ICD-10-PCS | Mod: CPTII,S$GLB,, | Performed by: STUDENT IN AN ORGANIZED HEALTH CARE EDUCATION/TRAINING PROGRAM

## 2019-05-07 PROCEDURE — 99999 PR PBB SHADOW E&M-EST. PATIENT-LVL IV: ICD-10-PCS | Mod: PBBFAC,,, | Performed by: STUDENT IN AN ORGANIZED HEALTH CARE EDUCATION/TRAINING PROGRAM

## 2019-05-07 PROCEDURE — 3074F PR MOST RECENT SYSTOLIC BLOOD PRESSURE < 130 MM HG: ICD-10-PCS | Mod: CPTII,S$GLB,, | Performed by: STUDENT IN AN ORGANIZED HEALTH CARE EDUCATION/TRAINING PROGRAM

## 2019-05-07 PROCEDURE — 99999 PR PBB SHADOW E&M-EST. PATIENT-LVL IV: CPT | Mod: PBBFAC,,, | Performed by: STUDENT IN AN ORGANIZED HEALTH CARE EDUCATION/TRAINING PROGRAM

## 2019-05-07 PROCEDURE — 3079F DIAST BP 80-89 MM HG: CPT | Mod: CPTII,S$GLB,, | Performed by: STUDENT IN AN ORGANIZED HEALTH CARE EDUCATION/TRAINING PROGRAM

## 2019-05-07 PROCEDURE — 3079F PR MOST RECENT DIASTOLIC BLOOD PRESSURE 80-89 MM HG: ICD-10-PCS | Mod: CPTII,S$GLB,, | Performed by: STUDENT IN AN ORGANIZED HEALTH CARE EDUCATION/TRAINING PROGRAM

## 2019-05-07 PROCEDURE — 3008F BODY MASS INDEX DOCD: CPT | Mod: CPTII,S$GLB,, | Performed by: STUDENT IN AN ORGANIZED HEALTH CARE EDUCATION/TRAINING PROGRAM

## 2019-05-07 ASSESSMENT — ROUTINE ASSESSMENT OF PATIENT INDEX DATA (RAPID3)
FATIGUE SCORE: 0
PATIENT GLOBAL ASSESSMENT SCORE: 8.5
MDHAQ FUNCTION SCORE: .3
AM STIFFNESS SCORE: 0, NO
TOTAL RAPID3 SCORE: 5.83
PAIN SCORE: 8
PSYCHOLOGICAL DISTRESS SCORE: 3.3

## 2019-05-08 NOTE — PROGRESS NOTES
Patient seen and examined with fellow.  All elements of history, physical exam and medical decision making independently confirmed by me.  Patient with PsA significantly improved on MTX.  Check labs.  Patient instructed to follow up with primary care doctor regarding CP evaluated in urgent care.  See note for details.

## 2019-05-09 ENCOUNTER — TELEPHONE (OUTPATIENT)
Dept: URGENT CARE | Facility: CLINIC | Age: 59
End: 2019-05-09

## 2019-05-10 ENCOUNTER — TELEPHONE (OUTPATIENT)
Dept: RHEUMATOLOGY | Facility: CLINIC | Age: 59
End: 2019-05-10

## 2019-05-10 NOTE — TELEPHONE ENCOUNTER
Received fax from [facility] regarding MTX rx.  Called .  Spoke with pharmacist and medication verified.  All questions answered.     They will be processing the rx.    Clinic number provided if they have any questions.

## 2019-06-03 ENCOUNTER — OFFICE VISIT (OUTPATIENT)
Dept: FAMILY MEDICINE | Facility: CLINIC | Age: 59
End: 2019-06-03
Payer: COMMERCIAL

## 2019-06-03 VITALS
DIASTOLIC BLOOD PRESSURE: 80 MMHG | WEIGHT: 240 LBS | BODY MASS INDEX: 34.36 KG/M2 | SYSTOLIC BLOOD PRESSURE: 122 MMHG | HEIGHT: 70 IN | HEART RATE: 68 BPM

## 2019-06-03 DIAGNOSIS — F32.0 MILD MAJOR DEPRESSION: ICD-10-CM

## 2019-06-03 DIAGNOSIS — I25.10 CORONARY ARTERY DISEASE INVOLVING NATIVE CORONARY ARTERY OF NATIVE HEART WITHOUT ANGINA PECTORIS: Primary | ICD-10-CM

## 2019-06-03 DIAGNOSIS — E03.9 ACQUIRED HYPOTHYROIDISM: ICD-10-CM

## 2019-06-03 DIAGNOSIS — F41.9 ANXIETY: ICD-10-CM

## 2019-06-03 PROCEDURE — 3008F BODY MASS INDEX DOCD: CPT | Mod: CPTII,S$GLB,, | Performed by: INTERNAL MEDICINE

## 2019-06-03 PROCEDURE — 99214 PR OFFICE/OUTPT VISIT, EST, LEVL IV, 30-39 MIN: ICD-10-PCS | Mod: S$GLB,,, | Performed by: INTERNAL MEDICINE

## 2019-06-03 PROCEDURE — 3074F SYST BP LT 130 MM HG: CPT | Mod: CPTII,S$GLB,, | Performed by: INTERNAL MEDICINE

## 2019-06-03 PROCEDURE — 3008F PR BODY MASS INDEX (BMI) DOCUMENTED: ICD-10-PCS | Mod: CPTII,S$GLB,, | Performed by: INTERNAL MEDICINE

## 2019-06-03 PROCEDURE — 3074F PR MOST RECENT SYSTOLIC BLOOD PRESSURE < 130 MM HG: ICD-10-PCS | Mod: CPTII,S$GLB,, | Performed by: INTERNAL MEDICINE

## 2019-06-03 PROCEDURE — 99999 PR PBB SHADOW E&M-EST. PATIENT-LVL III: CPT | Mod: PBBFAC,,, | Performed by: INTERNAL MEDICINE

## 2019-06-03 PROCEDURE — 3079F DIAST BP 80-89 MM HG: CPT | Mod: CPTII,S$GLB,, | Performed by: INTERNAL MEDICINE

## 2019-06-03 PROCEDURE — 99214 OFFICE O/P EST MOD 30 MIN: CPT | Mod: S$GLB,,, | Performed by: INTERNAL MEDICINE

## 2019-06-03 PROCEDURE — 3079F PR MOST RECENT DIASTOLIC BLOOD PRESSURE 80-89 MM HG: ICD-10-PCS | Mod: CPTII,S$GLB,, | Performed by: INTERNAL MEDICINE

## 2019-06-03 PROCEDURE — 99999 PR PBB SHADOW E&M-EST. PATIENT-LVL III: ICD-10-PCS | Mod: PBBFAC,,, | Performed by: INTERNAL MEDICINE

## 2019-06-03 RX ORDER — ESCITALOPRAM OXALATE 5 MG/1
5 TABLET ORAL DAILY
Qty: 30 TABLET | Refills: 1 | Status: SHIPPED | OUTPATIENT
Start: 2019-06-03 | End: 2019-06-25 | Stop reason: SDUPTHER

## 2019-06-03 NOTE — PROGRESS NOTES
Assessment and Plan:    1. Coronary artery disease involving native coronary artery of native heart without angina pectoris  Recent NSTEMI as below with 2 GERI placed. No symptoms since discharge from the hospital. On appropriate medical therapy. She is planning to start cardiac rehab. Follow up with Cardiology as planned.  - Comprehensive metabolic panel; Future    2. Mild major depression  - escitalopram oxalate (LEXAPRO) 5 MG Tab; Take 1 tablet (5 mg total) by mouth once daily.  Dispense: 30 tablet; Refill: 1  3. Anxiety  - escitalopram oxalate (LEXAPRO) 5 MG Tab; Take 1 tablet (5 mg total) by mouth once daily.  Dispense: 30 tablet; Refill: 1  Reviewed slightly increased risk of bleeding with lexapro in addition to DAPT, but benefit thought to outweigh the risk given how bothersome the mood symptoms have been. Will restart lexapro and follow up in ~1 month.    4. Acquired hypothyroidism  TSH slightly elevated while hospitalized, but had not been taking levothyroxine consistently. She has been taking it now. Repeat labs in 6 weeks.  - TSH; Future    ______________________________________________________________________  Subjective:    Chief Complaint:  Hospital follow up    HPI:  Christin is a 59 y.o. year old woman here for hospital follow up.     She was admitted from 5/7-5/8 for substernal chest discomfort, troponin was elevated, she underwent LHC with placement of 2 GERI (focal lesion in mid RCA 95%, long lesion in LCx 70-75%, other non-obstructing disease). Discharged on DAPT with ASA and brilenta, also on BB and statin.    She reports that she feels remarkably better since the stents are placed. Reports that she is breathing better and no longer having chest pain. She is starting cardiac rehab and has this planned.     Reports that she continues to have a lot of trouble with depression and anxiety. She had been on lexapro in the past and felt like she was doing well on this. Had been on wellbutrin at one time  and can't remember when this was stopped. Notes a lot of mood issues related to her son's history with drug abuse, he has just gone back into rehab today.     Hypothyroidism- TSH noted to be elevated at 4.89 during hospitalization. Notes she has not always been taking the medications consistently but has been taking this since she was in the hospital.     Psoriatic arthritis- Had seen Rheumatology the day of hospital admission and continued methotrexate at current dose.    Medications:  Current Outpatient Medications on File Prior to Visit   Medication Sig Dispense Refill    AA/prot/lysine/methio/vit C/B6 (A/G PRO ORAL) Take 2 tablets by mouth once daily.       aspirin (ECOTRIN) 81 MG EC tablet Take 1 tablet (81 mg total) by mouth once daily. 30 tablet 0    atorvastatin (LIPITOR) 40 MG tablet Take 1 tablet (40 mg total) by mouth once daily. 30 tablet 0    biotin 1 mg tablet Take 1,000 mcg by mouth once daily.       folic acid (FOLVITE) 1 MG tablet Take 1 tablet (1 mg total) by mouth once daily. 30 tablet 11    levothyroxine (SYNTHROID) 125 MCG tablet Take 125 mcg by mouth every evening.       methotrexate 2.5 MG Tab Take 4 tablets (10 mg total) by mouth every 7 days. (Patient taking differently: Take 10 mg by mouth every 7 days. Pt takes on Mondays) 16 tablet 3    metoprolol succinate (TOPROL-XL) 50 MG 24 hr tablet Take 1 tablet (50 mg total) by mouth once daily. 30 tablet 0    multivitamin (DAILY MULTI-VITAMIN) per tablet Take 1 tablet by mouth once daily.       ticagrelor (BRILINTA) 90 mg tablet Take 1 tablet (90 mg total) by mouth 2 (two) times daily. 60 tablet 0    vitamin D 1000 units Tab Take 2,000 Units by mouth once daily.        No current facility-administered medications on file prior to visit.        Review of Systems:  Review of Systems   Constitutional: Negative for chills and fever.   Respiratory: Negative for chest tightness and shortness of breath.    Cardiovascular: Negative for chest  "pain, palpitations and leg swelling.   Gastrointestinal: Negative for nausea and vomiting.   Neurological: Negative for dizziness and light-headedness.   Psychiatric/Behavioral: Positive for dysphoric mood. Negative for self-injury and suicidal ideas. The patient is nervous/anxious.        Past Medical History:  Past Medical History:   Diagnosis Date    Arthritis     Depression     Hypertension     Following with Dr. Craig    Hypothyroidism     MONIKA (obstructive sleep apnea)     uses cpap    Thyroid disease     goiter       Objective:    Vitals:  Vitals:    06/03/19 0942   BP: 122/80   Pulse: 68   Weight: 108.8 kg (239 lb 15.5 oz)   Height: 5' 10" (1.778 m)   PainSc: 0-No pain       Physical Exam   Constitutional: She is oriented to person, place, and time. She appears well-developed and well-nourished. No distress.   HENT:   Mouth/Throat: Oropharynx is clear and moist.   Eyes: Conjunctivae are normal. Right eye exhibits no discharge. Left eye exhibits no discharge.   Cardiovascular: Normal rate and regular rhythm.   No murmur heard.  Pulmonary/Chest: Effort normal. No respiratory distress.   Musculoskeletal: She exhibits no edema.   Neurological: She is alert and oriented to person, place, and time.   Skin: Skin is warm and dry.   Psychiatric: She has a normal mood and affect. Her behavior is normal. Judgment and thought content normal.   Vitals reviewed.      Data:  Previous labs reviewed and pertinent for elevated cholesterol and TG, A1c 5.2.      Judi Gonsalez MD  Internal Medicine  "

## 2019-06-05 ENCOUNTER — PATIENT MESSAGE (OUTPATIENT)
Dept: FAMILY MEDICINE | Facility: CLINIC | Age: 59
End: 2019-06-05

## 2019-06-06 PROBLEM — E78.5 DYSLIPIDEMIA: Status: ACTIVE | Noted: 2019-06-06

## 2019-06-06 PROBLEM — I25.84 CORONARY ARTERY DISEASE DUE TO CALCIFIED CORONARY LESION: Status: ACTIVE | Noted: 2019-06-06

## 2019-06-06 PROBLEM — E66.01 MORBID OBESITY: Status: ACTIVE | Noted: 2019-06-06

## 2019-06-06 PROBLEM — I25.10 CORONARY ARTERY DISEASE DUE TO CALCIFIED CORONARY LESION: Status: ACTIVE | Noted: 2019-06-06

## 2019-06-06 PROBLEM — E78.00 HYPERCHOLESTEREMIA: Status: ACTIVE | Noted: 2019-06-06

## 2019-06-06 PROBLEM — G47.33 OBSTRUCTIVE SLEEP APNEA: Status: ACTIVE | Noted: 2019-06-06

## 2019-06-09 NOTE — TELEPHONE ENCOUNTER
----- Message from Hallie Callaway sent at 1/16/2018 11:14 AM CST -----  Contact: Patient  Christin, patient [phone] calling because she received a call stating that Anand Garsia does not see annual physicals, however patient now has the flu and wants to be seen for the flu, not for the physical. Patient will be coming in at 1 pm. Thanks.   Ambulatory

## 2019-06-12 ENCOUNTER — TELEPHONE (OUTPATIENT)
Dept: FAMILY MEDICINE | Facility: CLINIC | Age: 59
End: 2019-06-12

## 2019-06-12 ENCOUNTER — OFFICE VISIT (OUTPATIENT)
Dept: FAMILY MEDICINE | Facility: CLINIC | Age: 59
End: 2019-06-12
Payer: COMMERCIAL

## 2019-06-12 VITALS
BODY MASS INDEX: 34.09 KG/M2 | HEART RATE: 61 BPM | SYSTOLIC BLOOD PRESSURE: 112 MMHG | DIASTOLIC BLOOD PRESSURE: 64 MMHG | HEIGHT: 70 IN | OXYGEN SATURATION: 95 % | WEIGHT: 238.13 LBS

## 2019-06-12 DIAGNOSIS — F41.9 ANXIETY: ICD-10-CM

## 2019-06-12 DIAGNOSIS — Z00.00 PREVENTATIVE HEALTH CARE: ICD-10-CM

## 2019-06-12 DIAGNOSIS — Z23 NEED FOR HEPATITIS A AND B VACCINATION: ICD-10-CM

## 2019-06-12 DIAGNOSIS — F32.0 MILD MAJOR DEPRESSION: ICD-10-CM

## 2019-06-12 DIAGNOSIS — G47.33 OSA (OBSTRUCTIVE SLEEP APNEA): ICD-10-CM

## 2019-06-12 DIAGNOSIS — Z23 NEED FOR MMR VACCINE: ICD-10-CM

## 2019-06-12 DIAGNOSIS — Z71.84 ENCOUNTER FOR COUNSELING FOR TRAVEL: Primary | ICD-10-CM

## 2019-06-12 PROCEDURE — 3078F DIAST BP <80 MM HG: CPT | Mod: CPTII,S$GLB,, | Performed by: INTERNAL MEDICINE

## 2019-06-12 PROCEDURE — 99999 PR PBB SHADOW E&M-EST. PATIENT-LVL III: CPT | Mod: PBBFAC,,, | Performed by: INTERNAL MEDICINE

## 2019-06-12 PROCEDURE — 99214 OFFICE O/P EST MOD 30 MIN: CPT | Mod: S$GLB,,, | Performed by: INTERNAL MEDICINE

## 2019-06-12 PROCEDURE — 3008F PR BODY MASS INDEX (BMI) DOCUMENTED: ICD-10-PCS | Mod: CPTII,S$GLB,, | Performed by: INTERNAL MEDICINE

## 2019-06-12 PROCEDURE — 99999 PR PBB SHADOW E&M-EST. PATIENT-LVL III: ICD-10-PCS | Mod: PBBFAC,,, | Performed by: INTERNAL MEDICINE

## 2019-06-12 PROCEDURE — 3074F SYST BP LT 130 MM HG: CPT | Mod: CPTII,S$GLB,, | Performed by: INTERNAL MEDICINE

## 2019-06-12 PROCEDURE — 99214 PR OFFICE/OUTPT VISIT, EST, LEVL IV, 30-39 MIN: ICD-10-PCS | Mod: S$GLB,,, | Performed by: INTERNAL MEDICINE

## 2019-06-12 PROCEDURE — 3074F PR MOST RECENT SYSTOLIC BLOOD PRESSURE < 130 MM HG: ICD-10-PCS | Mod: CPTII,S$GLB,, | Performed by: INTERNAL MEDICINE

## 2019-06-12 PROCEDURE — 3078F PR MOST RECENT DIASTOLIC BLOOD PRESSURE < 80 MM HG: ICD-10-PCS | Mod: CPTII,S$GLB,, | Performed by: INTERNAL MEDICINE

## 2019-06-12 PROCEDURE — 3008F BODY MASS INDEX DOCD: CPT | Mod: CPTII,S$GLB,, | Performed by: INTERNAL MEDICINE

## 2019-06-12 RX ORDER — ATOVAQUONE AND PROGUANIL HYDROCHLORIDE 250; 100 MG/1; MG/1
1 TABLET, FILM COATED ORAL DAILY
Qty: 18 TABLET | Refills: 0 | Status: SHIPPED | OUTPATIENT
Start: 2019-06-12 | End: 2019-11-13

## 2019-06-12 NOTE — TELEPHONE ENCOUNTER
----- Message from Sofi Da Silva sent at 6/12/2019  2:09 PM CDT -----  Contact: Feng  Type:  Pharmacy Calling to Clarify an RX    Name of Caller:  Feng  Pharmacy Name:    [id number] - [place] - [address]  [address]  [place] [address]  Phone: [phone] Fax: [phone]  Prescription Name:    1. typhoid (VIVOTIF) DR capsule  2. methotrexate 2.5 MG Tab  What do they need to clarify?:   Asking on whether to dispense or not as new Rx Typhoid is noted to have a reaction with Methotrexate. Thanks!  Best Call Back Number:  [phone]  Additional Information:  na

## 2019-06-12 NOTE — PROGRESS NOTES
Assessment and Plan:    1. Encounter for counseling for travel  Reviewed vaccines needed for travel to [place], [place], [place], and [place] per [facility]. Malaria prophylaxis and typhoid vaccine prescribed today. Will come back for Hep A, Hep B, and MMR vaccines.   - atovaquone-proguanil (MALARONE) 250-100 mg Tab; Take 1 tablet by mouth once daily.  Dispense: 18 tablet; Refill: 0  - (In Office Administered) Hepatitis A / Hepatitis B Combined Vaccine (IM)  - (In Office Administered) MMR Vaccine (SQ)    2. Need for MMR vaccine  - (In Office Administered) MMR Vaccine (SQ)    3. Need for hepatitis A and B vaccination  - (In Office Administered) Hepatitis A / Hepatitis B Combined Vaccine (IM)    4. MONIKA (obstructive sleep apnea)  Rx sent to [facility].    5. Mild major depression  6. Anxiety  Symptoms starting to show early improvement with lexapro.     7. Preventative health care  - atovaquone-proguanil (MALARONE) 250-100 mg Tab; Take 1 tablet by mouth once daily.  Dispense: 18 tablet; Refill: 0  - (In Office Administered) Hepatitis A / Hepatitis B Combined Vaccine (IM)  - (In Office Administered) MMR Vaccine (SQ)      Hep A  Hep B  Measles  Typhoid- 4 dose pill vaccine that you complete before you leave  Malaria- Pill that you take every day while on the trip and for a week after you get back  ______________________________________________________________________  Subjective:    Chief Complaint:  Travel consultation and discuss sleep apnea    HPI:  Christin is a 59 y.o. year old woman here for annual exam and to discuss vaccines for travel.     She is planning a trip to [place], [place], [place], and [place]. [place]. She will be gone for 9 days total. Not planning on any caving. Mostly staying in resorts.    She feels like the lexapro is going well, had some nausea with this initially but now improving. Feels like her mood has improved as well. She reports that her son is now in a  better rehab which she is happier about.     She reports that she needs new equipment for her CPAP. She was going to [facility] but this is no longer covered by her insurance. She has been compliant with CPAP and finds this helpful.     Past Medical History:  Past Medical History:   Diagnosis Date    Arthritis     Depression     Hypertension     Following with Dr. Craig    Hypothyroidism     MONIKA (obstructive sleep apnea)     uses cpap    Thyroid disease     goiter       Past Surgical History:  Past Surgical History:   Procedure Laterality Date    ANGIOGRAM, CORONARY ARTERY N/A 2019    [facility]formed [facility] CATH    BUNIONECTOMY      right foot     SECTION      x2    COLONOSCOPY N/A 2016    [facility] Aj [facility]itt Jr[facility] ENDO    CYSTOSCOPY WITH RETROGRADE PYELOGRAM Right 2017    [facility]ers [facility] OR    Left heart cath N/A 2019    [facility] CATH    Stent, Drug Eluting, Multi Vessel, Coronary  2019    [facility] CATH    TONSILLECTOMY      Ultrasound-coronary N/A 2019    [facility] CATH    URETEROSCOPY Right 2017    [facility] OR       Family History:  Family History   Problem Relation Age of Onset    Heart disease Mother     Arthritis Mother     Diabetes Father     Heart disease Father     Diabetes Sister     Diabetes Brother     Heart disease Brother     Diabetes Paternal Aunt     Heart disease Maternal Grandfather     Cancer Neg Hx        Social History:  Social History     Socioeconomic History    Marital status:      Spouse name: Not on file    Number of children: Not on file    Years of education: Not on file    Highest education level: Not on file   Occupational History    Not on file   Social Needs    Financial resource strain: Not on file    Food insecurity:      Worry: Not on file     Inability: Not on file    Transportation needs:     Medical: Not on file     Non-medical: Not on file   Tobacco Use    Smoking status: Former Smoker     Packs/day: 0.15     Years: 4.00     Pack years: 0.60     Types: Cigarettes     Last attempt to quit: 10/1/2012     Years since quittin.6    Smokeless tobacco: Never Used   Substance and Sexual Activity    Alcohol use: Yes     Alcohol/week: 0.6 oz     Types: 1 Glasses of wine per week     Comment: socially    Drug use: No    Sexual activity: Yes     Partners: Male   Lifestyle    Physical activity:     Days per week: Not on file     Minutes per session: Not on file    Stress: Not on file   Relationships    Social connections:     Talks on phone: Not on file     Gets together: Not on file     Attends Gnosticist service: Not on file     Active member of club or organization: Not on file     Attends meetings of clubs or organizations: Not on file     Relationship status: Not on file   Other Topics Concern    Not on file   Social History Narrative    Not on file       Medications:  Current Outpatient Medications on File Prior to Visit   Medication Sig Dispense Refill    AA/prot/lysine/methio/vit C/B6 (A/G PRO ORAL) Take 2 tablets by mouth once daily.       aspirin (ECOTRIN) 81 MG EC tablet Take 1 tablet (81 mg total) by mouth once daily. 30 tablet 0    atorvastatin (LIPITOR) 40 MG tablet Take 1 tablet (40 mg total) by mouth once daily. 90 tablet 1    biotin 1 mg tablet Take 1,000 mcg by mouth once daily.       escitalopram oxalate (LEXAPRO) 5 MG Tab Take 1 tablet (5 mg total) by mouth once daily. 30 tablet 1    folic acid (FOLVITE) 1 MG tablet Take 1 tablet (1 mg total) by mouth once daily. 30 tablet 11    levothyroxine (SYNTHROID) 125 MCG tablet Take 125 mcg by mouth every evening.       methotrexate 2.5 MG Tab Take 4 tablets (10 mg total) by mouth every 7 days. 16 tablet 3    metoprolol succinate (TOPROL-XL) 50 MG 24 hr  "tablet Take 1 tablet (50 mg total) by mouth once daily. 90 tablet 1    multivitamin (DAILY MULTI-VITAMIN) per tablet Take 1 tablet by mouth once daily.       ticagrelor (BRILINTA) 90 mg tablet Take 1 tablet (90 mg total) by mouth 2 (two) times daily. 180 tablet 1    vitamin D 1000 units Tab Take 2,000 Units by mouth once daily.        No current facility-administered medications on file prior to visit.        Allergies:  [place]    Immunizations:  Immunization History   Administered Date(s) Administered    Influenza 10/01/2013    Influenza - Quadrivalent 12/08/2014    Influenza Split 10/01/2013    Tdap 10/01/2013       Review of Systems:  Review of Systems   Constitutional: Negative for activity change and appetite change.   Gastrointestinal: Positive for nausea. Negative for abdominal pain and vomiting.   Psychiatric/Behavioral: Positive for dysphoric mood (improving). The patient is not nervous/anxious.        Objective:    Vitals:  Vitals:    06/12/19 1118   BP: 112/64   Pulse: 61   SpO2: 95%   Weight: 108 kg (238 lb 1.6 oz)   Height: 5' 10" (1.778 m)   PainSc: 0-No pain       Physical Exam   Constitutional: She is oriented to person, place, and time. She appears well-developed and well-nourished. No distress.   HENT:   Mouth/Throat: Oropharynx is clear and moist.   Eyes: Conjunctivae are normal. Right eye exhibits no discharge. Left eye exhibits no discharge.   Cardiovascular: Normal rate and regular rhythm.   Pulmonary/Chest: Effort normal. No respiratory distress.   Neurological: She is alert and oriented to person, place, and time.   Skin: Skin is warm and dry.   Psychiatric: She has a normal mood and affect. Her behavior is normal. Judgment and thought content normal.   Vitals reviewed.      Data:  Sleep study from 2009 reviewed, severe sleep apnea with hypersomnia noted    Judi Gonsalze MD  Internal Medicine    "

## 2019-06-13 ENCOUNTER — INITIAL CONSULT (OUTPATIENT)
Dept: RHEUMATOLOGY | Facility: CLINIC | Age: 59
End: 2019-06-13
Payer: COMMERCIAL

## 2019-06-13 VITALS
WEIGHT: 236.75 LBS | HEART RATE: 61 BPM | BODY MASS INDEX: 33.89 KG/M2 | HEIGHT: 70 IN | SYSTOLIC BLOOD PRESSURE: 128 MMHG | RESPIRATION RATE: 13 BRPM | DIASTOLIC BLOOD PRESSURE: 79 MMHG

## 2019-06-13 DIAGNOSIS — I21.4 NSTEMI (NON-ST ELEVATED MYOCARDIAL INFARCTION): ICD-10-CM

## 2019-06-13 DIAGNOSIS — L40.50 PSA (PSORIATIC ARTHRITIS): Primary | ICD-10-CM

## 2019-06-13 DIAGNOSIS — L40.50 PSORIATIC ARTHRITIS: ICD-10-CM

## 2019-06-13 DIAGNOSIS — Z79.899 HIGH RISK MEDICATION USE: ICD-10-CM

## 2019-06-13 DIAGNOSIS — S93.306A: ICD-10-CM

## 2019-06-13 PROCEDURE — 99999 PR PBB SHADOW E&M-EST. PATIENT-LVL III: ICD-10-PCS | Mod: PBBFAC,,, | Performed by: INTERNAL MEDICINE

## 2019-06-13 PROCEDURE — 3078F PR MOST RECENT DIASTOLIC BLOOD PRESSURE < 80 MM HG: ICD-10-PCS | Mod: CPTII,S$GLB,, | Performed by: INTERNAL MEDICINE

## 2019-06-13 PROCEDURE — 3008F BODY MASS INDEX DOCD: CPT | Mod: CPTII,S$GLB,, | Performed by: INTERNAL MEDICINE

## 2019-06-13 PROCEDURE — 3008F PR BODY MASS INDEX (BMI) DOCUMENTED: ICD-10-PCS | Mod: CPTII,S$GLB,, | Performed by: INTERNAL MEDICINE

## 2019-06-13 PROCEDURE — 99215 PR OFFICE/OUTPT VISIT, EST, LEVL V, 40-54 MIN: ICD-10-PCS | Mod: S$GLB,,, | Performed by: INTERNAL MEDICINE

## 2019-06-13 PROCEDURE — 3074F SYST BP LT 130 MM HG: CPT | Mod: CPTII,S$GLB,, | Performed by: INTERNAL MEDICINE

## 2019-06-13 PROCEDURE — 3078F DIAST BP <80 MM HG: CPT | Mod: CPTII,S$GLB,, | Performed by: INTERNAL MEDICINE

## 2019-06-13 PROCEDURE — 3074F PR MOST RECENT SYSTOLIC BLOOD PRESSURE < 130 MM HG: ICD-10-PCS | Mod: CPTII,S$GLB,, | Performed by: INTERNAL MEDICINE

## 2019-06-13 PROCEDURE — 99999 PR PBB SHADOW E&M-EST. PATIENT-LVL III: CPT | Mod: PBBFAC,,, | Performed by: INTERNAL MEDICINE

## 2019-06-13 PROCEDURE — 99215 OFFICE O/P EST HI 40 MIN: CPT | Mod: S$GLB,,, | Performed by: INTERNAL MEDICINE

## 2019-06-13 RX ORDER — FOLIC ACID 1 MG/1
1 TABLET ORAL DAILY
Qty: 90 TABLET | Refills: 3 | Status: SHIPPED | OUTPATIENT
Start: 2019-06-13 | End: 2019-08-19

## 2019-06-13 RX ORDER — METHOTREXATE 2.5 MG/1
12.5 TABLET ORAL
Qty: 60 TABLET | Refills: 3 | Status: SHIPPED | OUTPATIENT
Start: 2019-06-13 | End: 2019-08-19

## 2019-06-13 ASSESSMENT — ROUTINE ASSESSMENT OF PATIENT INDEX DATA (RAPID3)
MDHAQ FUNCTION SCORE: .2
PAIN SCORE: 0
PSYCHOLOGICAL DISTRESS SCORE: 2.2
TOTAL RAPID3 SCORE: .72
PATIENT GLOBAL ASSESSMENT SCORE: 1.5

## 2019-06-13 NOTE — PROGRESS NOTES
Subjective:          Chief Complaint: Christin Caruso is a 59 y.o. female who had concerns including Psoriatic Arthritis.    HPI:    Patient is a 59-year-old female self-referred for joint aches and pains has been seen by me in 2016 all attributed to osteoarthritis but she did have a low titer positive NIESHA 1:160 homogeneous negative NIESHA profile.  That time last seen she was doing well with aleve for joint aches and pains.  As of 2019 she was seen Dr. Zuniga on the [place] noticing that the last 6 months of 2018 she was having a increased and progressive arthralgia now involving the entire right hand left hand was now painful still having stiffness in the morning less than 5 min for back hip and legs.    She has more recently been diagnosed with by temporal scalp psoriasis with Dr. Norma Waldrop given topical solution.  Dr. Zuniga did have an MRI done which did show some inflammatory arthritis.  There was a question of whether starting Plaquenil would work but given the risk for flaring of the psoriasis methotrexate was preferred she start MTX.     She has been on methotrexate 10 mg weekly with marked improvement in her joints        MRI 03/13/2019 shows enhancement at the 1st metacarpal with synovial articulation joint effusion increased T2 signal and a sub subluxation versus ligamentous laxity she had some cortical erosions questioned but none overt found mostly degenerative changes noted at the 1st metacarpal and carpal articulations.  She did have fluid signal intensity seen in the flexor digitorum suggestive of a tenosynovitis and there is widening within the scapholunate ligament also suggestive of an inflammatory arthritis.      Previous serologies NIESHA + 1:160 homogenous, RF negative, ESR/CRP wnl, +TPO Ab.    REVIEW OF SYSTEMS:    Review of Systems   Constitutional: Negative for fever, malaise/fatigue and weight loss.   HENT: Negative for sore throat.    Eyes: Negative for double vision, photophobia and  redness.   Respiratory: Negative for cough, shortness of breath and wheezing.    Cardiovascular: Negative for chest pain, palpitations and orthopnea.   Gastrointestinal: Negative for abdominal pain, constipation and diarrhea.   Genitourinary: Negative for dysuria, hematuria and urgency.   Musculoskeletal: Positive for joint pain. Negative for back pain and myalgias.   Skin: Negative for rash.   Neurological: Negative for dizziness, tingling, focal weakness and headaches.   Endo/Heme/Allergies: Does not bruise/bleed easily.   Psychiatric/Behavioral: Negative for depression, hallucinations and suicidal ideas.               Objective:            Past Medical History:   Diagnosis Date    Arthritis     Depression     Hypertension     Following with Dr. Craig    Hypothyroidism     MONIKA (obstructive sleep apnea)     uses cpap    Thyroid disease     goiter     Family History   Problem Relation Age of Onset    Heart disease Mother     Arthritis Mother     Diabetes Father     Heart disease Father     Diabetes Sister     Diabetes Brother     Heart disease Brother     Diabetes Paternal Aunt     Heart disease Maternal Grandfather     Cancer Neg Hx      Social History     Tobacco Use    Smoking status: Former Smoker     Packs/day: 0.15     Years: 4.00     Pack years: 0.60     Types: Cigarettes     Last attempt to quit: 10/1/2012     Years since quittin.7    Smokeless tobacco: Never Used   Substance Use Topics    Alcohol use: Yes     Alcohol/week: 0.6 oz     Types: 1 Glasses of wine per week     Comment: socially    Drug use: No         Current Outpatient Medications on File Prior to Visit   Medication Sig Dispense Refill    AA/prot/lysine/methio/vit C/B6 (A/G PRO ORAL) Take 2 tablets by mouth once daily.       aspirin (ECOTRIN) 81 MG EC tablet Take 1 tablet (81 mg total) by mouth once daily. 30 tablet 0    atorvastatin (LIPITOR) 40 MG tablet Take 1 tablet (40 mg total) by mouth once daily. 90 tablet 1     atovaquone-proguanil (MALARONE) 250-100 mg Tab Take 1 tablet by mouth once daily. 18 tablet 0    biotin 1 mg tablet Take 1,000 mcg by mouth once daily.       escitalopram oxalate (LEXAPRO) 5 MG Tab Take 1 tablet (5 mg total) by mouth once daily. 30 tablet 1    folic acid (FOLVITE) 1 MG tablet Take 1 tablet (1 mg total) by mouth once daily. 30 tablet 11    levothyroxine (SYNTHROID) 125 MCG tablet Take 125 mcg by mouth every evening.       methotrexate 2.5 MG Tab Take 4 tablets (10 mg total) by mouth every 7 days. 16 tablet 3    metoprolol succinate (TOPROL-XL) 50 MG 24 hr tablet Take 1 tablet (50 mg total) by mouth once daily. 90 tablet 1    multivitamin (DAILY MULTI-VITAMIN) per tablet Take 1 tablet by mouth once daily.       ticagrelor (BRILINTA) 90 mg tablet Take 1 tablet (90 mg total) by mouth 2 (two) times daily. 180 tablet 1    typhoid (VIVOTIF) DR capsule Take 1 capsule by mouth every other day. 4 capsule 0    vitamin D 1000 units Tab Take 2,000 Units by mouth once daily.        No current facility-administered medications on file prior to visit.        Vitals:    06/13/19 1124   BP: 128/79   Pulse: 61   Resp: 13       Physical Exam:    Physical Exam   Constitutional: She appears well-developed and well-nourished.   HENT:   Nose: No septal deviation.   Mouth/Throat: Mucous membranes are normal. No oral lesions.   Eyes: Pupils are equal, round, and reactive to light. Right conjunctiva is not injected. Left conjunctiva is not injected.   Neck: No JVD present. No thyroid mass and no thyromegaly present.   Cardiovascular: Normal rate, regular rhythm and normal pulses.   No edema   Pulmonary/Chest: Effort normal and breath sounds normal.   Abdominal: Soft. Normal appearance. There is no hepatosplenomegaly.   Musculoskeletal:        Right shoulder: She exhibits normal range of motion, no tenderness and no swelling.        Left shoulder: She exhibits normal range of motion, no tenderness and no  swelling.        Right elbow: She exhibits normal range of motion and no swelling. No tenderness found.        Left elbow: She exhibits normal range of motion and no swelling. No tenderness found.        Right wrist: She exhibits decreased range of motion and tenderness. She exhibits no swelling.        Left wrist: She exhibits decreased range of motion, tenderness and bony tenderness. She exhibits no swelling.        Right hip: She exhibits normal range of motion, normal strength and no swelling.        Left hip: She exhibits normal range of motion, no tenderness and no swelling.        Right knee: She exhibits normal range of motion and no swelling. No tenderness found.        Left knee: She exhibits normal range of motion and no swelling. No tenderness found.        Right ankle: She exhibits normal range of motion and no swelling. No tenderness.        Left ankle: She exhibits normal range of motion and no swelling. No tenderness.   Right 3rd IP with subluxation and bony hypertrophy with loss of flexion. + large heberden nodes b/l. Right hand with more significant deformity. 1+ synovitis unalble to finger curl.  impaired.   Lymphadenopathy:     She has no cervical adenopathy.     She has no axillary adenopathy.   Neurological: She has normal strength and normal reflexes.   Skin: Skin is dry and intact.   Psychiatric: She has a normal mood and affect.             Assessment:       Encounter Diagnoses   Name Primary?    PSA (psoriatic arthritis) Yes    NSTEMI (non-ST elevated myocardial infarction)     Subluxation of metatarsal - Right Foot     Psoriatic arthritis     High risk medication use           Plan:      +NIESHA: I suspect this is secondary to thyroid disease with + TPO in 2012. No evidence of CTD.    Primary osteoarthritis involving multiple joints      Spondylosis of lumbar region without myelopathy or radiculopathy        - Imaging lumbar spine.    Impingement syndrome, shoulder, right        - Handout on ROM home exercises for rotator cuff tendinitis    Other orders  -     naproxen (NAPROSYN) 500 MG tablet; Take 1 tablet (500 mg total) by mouth 2 (two) times daily with meals.  Dispense: 60 tablet; Refill: 2    Very pleasant 54 y/o female with a prior polyarthritis, more recently dx with Ps. Diagnosis PsA.   We are going to continue with MTX  Increase in mid foot pain s/p buniectomy 5 years ago still painful.     We also reviewed the risks benefits and monitoring requirements of methotrexate therapy; not limited to weekly dosing, monitoring requirements, daily folic acid the avoidance of alcohol and the potential abortifacient and teratogenic nature.   Minor elevations in aminotransferases are common, but hepatic steatosis, fibrosis, and cirrhosis may infrequently occur. Routine use of daily folic acid supplementation, which is associated with a reduced risk of hepatic transaminase elevations. Pulmonary toxicity of MTX is seen with both high- and low-dose treatment .MTX is an immunomodulatory but not significantly immunosuppressive agent; it is not associated with opportunistic infections in the overwhelming majority of patients. Hematologic toxicity is possible, but a more serious abnormality is the development of pancytopenia. Lymphoproliferative disorders occur with increased frequency in RA, independent of specific therapies, but MTX therapy may increase such risk      No follow-ups on file.    65min consultation with greater than 50% spent in counseling, chart review and coordination of care. All questions answered.

## 2019-06-13 NOTE — TELEPHONE ENCOUNTER
Please call pharmacist to clarify concern. My understanding is that live vaccines are not specifically contraindicated if patients are taking less than 0.4 mg/kg/week of methotrexate, and it looks like she is taking ~0.1mg/kg/week. Is there a different concern than this?

## 2019-06-18 ENCOUNTER — TELEPHONE (OUTPATIENT)
Dept: FAMILY MEDICINE | Facility: CLINIC | Age: 59
End: 2019-06-18

## 2019-06-18 NOTE — TELEPHONE ENCOUNTER
----- Message from Salma Chappell sent at 6/18/2019  1:46 PM CDT -----  Contact: patient  Type: Needs Medical Advice    Who Called:  patient  Symptoms (please be specific):    How long has patient had these symptoms:    Pharmacy name and phone #:    Best Call Back Number: [phone]  Additional Information: called to advise that fax that was sent wasn't completed and request for cpap supplies are being refaxed by [facility]

## 2019-06-18 NOTE — TELEPHONE ENCOUNTER
Waiting for detailed order to be faxed over from [facility] to be signed by physician and refaxed.

## 2019-06-20 ENCOUNTER — CLINICAL SUPPORT (OUTPATIENT)
Dept: FAMILY MEDICINE | Facility: CLINIC | Age: 59
End: 2019-06-20
Payer: COMMERCIAL

## 2019-06-20 ENCOUNTER — TELEPHONE (OUTPATIENT)
Dept: FAMILY MEDICINE | Facility: CLINIC | Age: 59
End: 2019-06-20

## 2019-06-20 ENCOUNTER — LAB VISIT (OUTPATIENT)
Dept: LAB | Facility: HOSPITAL | Age: 59
End: 2019-06-20
Attending: INTERNAL MEDICINE
Payer: COMMERCIAL

## 2019-06-20 DIAGNOSIS — I25.84 CORONARY ARTERY DISEASE DUE TO CALCIFIED CORONARY LESION: ICD-10-CM

## 2019-06-20 DIAGNOSIS — E78.00 HYPERCHOLESTEREMIA: ICD-10-CM

## 2019-06-20 DIAGNOSIS — L40.50 PSORIATIC ARTHRITIS: ICD-10-CM

## 2019-06-20 DIAGNOSIS — I25.10 CORONARY ARTERY DISEASE DUE TO CALCIFIED CORONARY LESION: ICD-10-CM

## 2019-06-20 DIAGNOSIS — A09 TRAVELER'S DIARRHEA: Primary | ICD-10-CM

## 2019-06-20 DIAGNOSIS — L40.50 PSA (PSORIATIC ARTHRITIS): ICD-10-CM

## 2019-06-20 DIAGNOSIS — Z79.899 HIGH RISK MEDICATION USE: ICD-10-CM

## 2019-06-20 LAB
ALBUMIN SERPL BCP-MCNC: 3.9 G/DL (ref 3.5–5.2)
ALP SERPL-CCNC: 80 U/L (ref 55–135)
ALT SERPL W/O P-5'-P-CCNC: 28 U/L (ref 10–44)
ANION GAP SERPL CALC-SCNC: 9 MMOL/L (ref 8–16)
AST SERPL-CCNC: 25 U/L (ref 10–40)
BASOPHILS # BLD AUTO: 0.04 K/UL (ref 0–0.2)
BASOPHILS NFR BLD: 0.7 % (ref 0–1.9)
BILIRUB SERPL-MCNC: 1.3 MG/DL (ref 0.1–1)
BUN SERPL-MCNC: 16 MG/DL (ref 6–20)
CALCIUM SERPL-MCNC: 9 MG/DL (ref 8.7–10.5)
CHLORIDE SERPL-SCNC: 108 MMOL/L (ref 95–110)
CHOLEST SERPL-MCNC: 118 MG/DL (ref 120–199)
CHOLEST/HDLC SERPL: 3.8 {RATIO} (ref 2–5)
CK SERPL-CCNC: 111 U/L (ref 20–180)
CO2 SERPL-SCNC: 28 MMOL/L (ref 23–29)
CREAT SERPL-MCNC: 0.8 MG/DL (ref 0.5–1.4)
CRP SERPL-MCNC: 1.9 MG/L (ref 0–8.2)
DIFFERENTIAL METHOD: NORMAL
EOSINOPHIL # BLD AUTO: 0.2 K/UL (ref 0–0.5)
EOSINOPHIL NFR BLD: 2.5 % (ref 0–8)
ERYTHROCYTE [DISTWIDTH] IN BLOOD BY AUTOMATED COUNT: 13.6 % (ref 11.5–14.5)
ERYTHROCYTE [SEDIMENTATION RATE] IN BLOOD BY WESTERGREN METHOD: 6 MM/HR (ref 0–20)
EST. GFR  (AFRICAN AMERICAN): >60 ML/MIN/1.73 M^2
EST. GFR  (NON AFRICAN AMERICAN): >60 ML/MIN/1.73 M^2
GLUCOSE SERPL-MCNC: 99 MG/DL (ref 70–110)
HCT VFR BLD AUTO: 41.5 % (ref 37–48.5)
HDLC SERPL-MCNC: 31 MG/DL (ref 40–75)
HDLC SERPL: 26.3 % (ref 20–50)
HGB BLD-MCNC: 13.4 G/DL (ref 12–16)
IMM GRANULOCYTES # BLD AUTO: 0.01 K/UL (ref 0–0.04)
IMM GRANULOCYTES NFR BLD AUTO: 0.2 % (ref 0–0.5)
LDLC SERPL CALC-MCNC: 59.6 MG/DL (ref 63–159)
LYMPHOCYTES # BLD AUTO: 1.9 K/UL (ref 1–4.8)
LYMPHOCYTES NFR BLD: 30.6 % (ref 18–48)
MCH RBC QN AUTO: 29.8 PG (ref 27–31)
MCHC RBC AUTO-ENTMCNC: 32.3 G/DL (ref 32–36)
MCV RBC AUTO: 92 FL (ref 82–98)
MONOCYTES # BLD AUTO: 0.5 K/UL (ref 0.3–1)
MONOCYTES NFR BLD: 7.7 % (ref 4–15)
NEUTROPHILS # BLD AUTO: 3.6 K/UL (ref 1.8–7.7)
NEUTROPHILS NFR BLD: 58.3 % (ref 38–73)
NONHDLC SERPL-MCNC: 87 MG/DL
NRBC BLD-RTO: 0 /100 WBC
PLATELET # BLD AUTO: 231 K/UL (ref 150–350)
PMV BLD AUTO: 11.3 FL (ref 9.2–12.9)
POTASSIUM SERPL-SCNC: 4.2 MMOL/L (ref 3.5–5.1)
PROT SERPL-MCNC: 6.5 G/DL (ref 6–8.4)
RBC # BLD AUTO: 4.5 M/UL (ref 4–5.4)
SODIUM SERPL-SCNC: 145 MMOL/L (ref 136–145)
TRIGL SERPL-MCNC: 137 MG/DL (ref 30–150)
WBC # BLD AUTO: 6.12 K/UL (ref 3.9–12.7)

## 2019-06-20 PROCEDURE — 90472 IMMUNIZATION ADMIN EACH ADD: CPT | Mod: S$GLB,,, | Performed by: INTERNAL MEDICINE

## 2019-06-20 PROCEDURE — 85025 COMPLETE CBC W/AUTO DIFF WBC: CPT

## 2019-06-20 PROCEDURE — 90472 HEPATITIS A HEPATITIS B COMBINED VACCINE IM: ICD-10-PCS | Mod: S$GLB,,, | Performed by: INTERNAL MEDICINE

## 2019-06-20 PROCEDURE — 99999 PR PBB SHADOW E&M-EST. PATIENT-LVL I: ICD-10-PCS | Mod: PBBFAC,,,

## 2019-06-20 PROCEDURE — 90471 MMR VACCINE SQ: ICD-10-PCS | Mod: S$GLB,,, | Performed by: INTERNAL MEDICINE

## 2019-06-20 PROCEDURE — 86140 C-REACTIVE PROTEIN: CPT

## 2019-06-20 PROCEDURE — 90707 MMR VACCINE SQ: ICD-10-PCS | Mod: S$GLB,,, | Performed by: INTERNAL MEDICINE

## 2019-06-20 PROCEDURE — 85651 RBC SED RATE NONAUTOMATED: CPT | Mod: PO

## 2019-06-20 PROCEDURE — 82550 ASSAY OF CK (CPK): CPT

## 2019-06-20 PROCEDURE — 80053 COMPREHEN METABOLIC PANEL: CPT

## 2019-06-20 PROCEDURE — 80061 LIPID PANEL: CPT

## 2019-06-20 PROCEDURE — 36415 COLL VENOUS BLD VENIPUNCTURE: CPT | Mod: PO

## 2019-06-20 PROCEDURE — 99999 PR PBB SHADOW E&M-EST. PATIENT-LVL I: CPT | Mod: PBBFAC,,,

## 2019-06-20 PROCEDURE — 90707 MMR VACCINE SC: CPT | Mod: S$GLB,,, | Performed by: INTERNAL MEDICINE

## 2019-06-20 PROCEDURE — 90636 HEP A/HEP B VACC ADULT IM: CPT | Mod: S$GLB,,, | Performed by: INTERNAL MEDICINE

## 2019-06-20 PROCEDURE — 90636 HEPATITIS A HEPATITIS B COMBINED VACCINE IM: ICD-10-PCS | Mod: S$GLB,,, | Performed by: INTERNAL MEDICINE

## 2019-06-20 PROCEDURE — 90471 IMMUNIZATION ADMIN: CPT | Mod: S$GLB,,, | Performed by: INTERNAL MEDICINE

## 2019-06-20 RX ORDER — AZITHROMYCIN 500 MG/1
500 TABLET, FILM COATED ORAL DAILY
Qty: 3 TABLET | Refills: 0 | Status: SHIPPED | OUTPATIENT
Start: 2019-06-20 | End: 2019-08-19

## 2019-06-20 NOTE — TELEPHONE ENCOUNTER
----- Message from Annetta Kumar sent at 6/20/2019  1:15 PM CDT -----  Type:  Sooner Apoointment Request    Caller is requesting a sooner appointment.  Caller declined first available appointment listed below.  Caller will not accept being placed on the waitlist and is requesting a message be sent to doctor.    Name of Caller: self   When is the first available appointment?  NA   Symptoms:  NA   Best Call Back Number:  [phone]  Additional Information:  Patient asking to come in tomorrow to get [place] vaccine.

## 2019-06-25 DIAGNOSIS — F32.0 MILD MAJOR DEPRESSION: ICD-10-CM

## 2019-06-25 DIAGNOSIS — F41.9 ANXIETY: ICD-10-CM

## 2019-06-25 RX ORDER — ESCITALOPRAM OXALATE 5 MG/1
TABLET ORAL
Qty: 30 TABLET | Refills: 1 | Status: SHIPPED | OUTPATIENT
Start: 2019-06-25 | End: 2019-07-19 | Stop reason: SDUPTHER

## 2019-07-01 ENCOUNTER — PATIENT MESSAGE (OUTPATIENT)
Dept: FAMILY MEDICINE | Facility: CLINIC | Age: 59
End: 2019-07-01

## 2019-07-04 ENCOUNTER — PATIENT MESSAGE (OUTPATIENT)
Dept: RHEUMATOLOGY | Facility: CLINIC | Age: 59
End: 2019-07-04

## 2019-07-08 ENCOUNTER — TELEPHONE (OUTPATIENT)
Dept: FAMILY MEDICINE | Facility: CLINIC | Age: 59
End: 2019-07-08

## 2019-07-09 ENCOUNTER — TELEPHONE (OUTPATIENT)
Dept: FAMILY MEDICINE | Facility: CLINIC | Age: 59
End: 2019-07-09

## 2019-07-09 NOTE — TELEPHONE ENCOUNTER
----- Message from Lor Villar sent at 7/9/2019  9:24 AM CDT -----  Contact: Self  Type: Cpap Rx  Needs Medical Advice    Who Called:  Self  Pharmacy name and phone #:  [facility]  [phone]  Best Call Back Number: [phone]  Additional Information: Please send Cpap Rx to [email or web address] the order number is [id number] patient said she is running out of time ( she said this is the supervisor's address)

## 2019-07-09 NOTE — TELEPHONE ENCOUNTER
Please let her know that we have re-faxed the order. Please let her know that we had already faxed this on 7/2/19 and if they are not getting it I can not really explain that. We have sent this order multiple times including all of the specific settings and even the order number. We can not email this, if she would like me to send it to that person directly, she will need to provide a direct fax number.

## 2019-07-09 NOTE — TELEPHONE ENCOUNTER
Reviewed with pt, she is going to  Rx at the  and send to [facility] herself.   Rx in file-a-fax at the

## 2019-07-15 ENCOUNTER — TELEPHONE (OUTPATIENT)
Dept: RHEUMATOLOGY | Facility: CLINIC | Age: 59
End: 2019-07-15

## 2019-07-15 ENCOUNTER — TELEPHONE (OUTPATIENT)
Dept: ADMINISTRATIVE | Facility: HOSPITAL | Age: 59
End: 2019-07-15

## 2019-07-15 NOTE — TELEPHONE ENCOUNTER
Offering 3 time slots tomorrow for the patient to be seen for hair loss. Call back number is provided. CG

## 2019-07-15 NOTE — TELEPHONE ENCOUNTER
Upon reviewing this patient's chart for statin compliance, it has been noticed that the patient was prescribed atorvastatin on 19 with an end date of 19.  For the patient to be compliant this medication can not be .  Is this an ongoing medication? If so could you please update the med card to reflect this.

## 2019-07-19 DIAGNOSIS — F41.9 ANXIETY: ICD-10-CM

## 2019-07-19 DIAGNOSIS — F32.0 MILD MAJOR DEPRESSION: ICD-10-CM

## 2019-07-19 RX ORDER — ESCITALOPRAM OXALATE 5 MG/1
TABLET ORAL
Qty: 30 TABLET | Refills: 1 | Status: SHIPPED | OUTPATIENT
Start: 2019-07-19 | End: 2019-08-19

## 2019-08-06 ENCOUNTER — TELEPHONE (OUTPATIENT)
Dept: RHEUMATOLOGY | Facility: CLINIC | Age: 59
End: 2019-08-06

## 2019-08-06 NOTE — TELEPHONE ENCOUNTER
----- Message from Mirna Ag sent at 8/5/2019  7:35 AM CDT -----  Contact: Patient  Type: Needs Medical Advice    Who Called: Patient  Best Call Back Number:   Additional Information: Calling to speak with the Nurse about scheduling an appt. She got the Nurse message about an earlier appt. [place] Please advise    [facility] that Dr. Brown's next available is 8-19-19 and she is booked with Dr. Cornelius at [facility] on 8-13-19. Asking for call back and for patient to advise us if she is going to stay at [facility]. CG

## 2019-08-08 ENCOUNTER — LAB VISIT (OUTPATIENT)
Dept: LAB | Facility: HOSPITAL | Age: 59
End: 2019-08-08
Payer: COMMERCIAL

## 2019-08-08 DIAGNOSIS — Z79.899 HIGH RISK MEDICATION USE: ICD-10-CM

## 2019-08-08 DIAGNOSIS — L40.50 PSA (PSORIATIC ARTHRITIS): ICD-10-CM

## 2019-08-08 DIAGNOSIS — L40.50 PSORIATIC ARTHRITIS: ICD-10-CM

## 2019-08-08 DIAGNOSIS — E03.9 ACQUIRED HYPOTHYROIDISM: ICD-10-CM

## 2019-08-08 LAB
ALBUMIN SERPL BCP-MCNC: 4.1 G/DL (ref 3.5–5.2)
ALP SERPL-CCNC: 83 U/L (ref 55–135)
ALT SERPL W/O P-5'-P-CCNC: 23 U/L (ref 10–44)
ANION GAP SERPL CALC-SCNC: 11 MMOL/L (ref 8–16)
AST SERPL-CCNC: 22 U/L (ref 10–40)
BASOPHILS # BLD AUTO: 0.03 K/UL (ref 0–0.2)
BASOPHILS NFR BLD: 0.4 % (ref 0–1.9)
BILIRUB SERPL-MCNC: 1.1 MG/DL (ref 0.1–1)
BUN SERPL-MCNC: 17 MG/DL (ref 6–20)
CALCIUM SERPL-MCNC: 9.5 MG/DL (ref 8.7–10.5)
CHLORIDE SERPL-SCNC: 105 MMOL/L (ref 95–110)
CO2 SERPL-SCNC: 26 MMOL/L (ref 23–29)
CREAT SERPL-MCNC: 0.8 MG/DL (ref 0.5–1.4)
CRP SERPL-MCNC: 2.7 MG/L (ref 0–8.2)
DIFFERENTIAL METHOD: ABNORMAL
EOSINOPHIL # BLD AUTO: 0.2 K/UL (ref 0–0.5)
EOSINOPHIL NFR BLD: 3 % (ref 0–8)
ERYTHROCYTE [DISTWIDTH] IN BLOOD BY AUTOMATED COUNT: 13.3 % (ref 11.5–14.5)
ERYTHROCYTE [SEDIMENTATION RATE] IN BLOOD BY WESTERGREN METHOD: 6 MM/HR (ref 0–20)
EST. GFR  (AFRICAN AMERICAN): >60 ML/MIN/1.73 M^2
EST. GFR  (NON AFRICAN AMERICAN): >60 ML/MIN/1.73 M^2
GLUCOSE SERPL-MCNC: 99 MG/DL (ref 70–110)
HCT VFR BLD AUTO: 43.5 % (ref 37–48.5)
HGB BLD-MCNC: 13.3 G/DL (ref 12–16)
IMM GRANULOCYTES # BLD AUTO: 0.01 K/UL (ref 0–0.04)
IMM GRANULOCYTES NFR BLD AUTO: 0.1 % (ref 0–0.5)
LYMPHOCYTES # BLD AUTO: 2 K/UL (ref 1–4.8)
LYMPHOCYTES NFR BLD: 29.1 % (ref 18–48)
MCH RBC QN AUTO: 29.3 PG (ref 27–31)
MCHC RBC AUTO-ENTMCNC: 30.6 G/DL (ref 32–36)
MCV RBC AUTO: 96 FL (ref 82–98)
MONOCYTES # BLD AUTO: 0.4 K/UL (ref 0.3–1)
MONOCYTES NFR BLD: 5 % (ref 4–15)
NEUTROPHILS # BLD AUTO: 4.4 K/UL (ref 1.8–7.7)
NEUTROPHILS NFR BLD: 62.4 % (ref 38–73)
NRBC BLD-RTO: 0 /100 WBC
PLATELET # BLD AUTO: 237 K/UL (ref 150–350)
PMV BLD AUTO: 11.5 FL (ref 9.2–12.9)
POTASSIUM SERPL-SCNC: 4.7 MMOL/L (ref 3.5–5.1)
PROT SERPL-MCNC: 6.8 G/DL (ref 6–8.4)
RBC # BLD AUTO: 4.54 M/UL (ref 4–5.4)
SODIUM SERPL-SCNC: 142 MMOL/L (ref 136–145)
TSH SERPL DL<=0.005 MIU/L-ACNC: 1.46 UIU/ML (ref 0.4–4)
WBC # BLD AUTO: 7 K/UL (ref 3.9–12.7)

## 2019-08-08 PROCEDURE — 36415 COLL VENOUS BLD VENIPUNCTURE: CPT | Mod: PO

## 2019-08-08 PROCEDURE — 80053 COMPREHEN METABOLIC PANEL: CPT

## 2019-08-08 PROCEDURE — 84443 ASSAY THYROID STIM HORMONE: CPT

## 2019-08-08 PROCEDURE — 86140 C-REACTIVE PROTEIN: CPT

## 2019-08-08 PROCEDURE — 85025 COMPLETE CBC W/AUTO DIFF WBC: CPT

## 2019-08-08 PROCEDURE — 85651 RBC SED RATE NONAUTOMATED: CPT | Mod: PO

## 2019-08-19 ENCOUNTER — OFFICE VISIT (OUTPATIENT)
Dept: RHEUMATOLOGY | Facility: CLINIC | Age: 59
End: 2019-08-19
Payer: COMMERCIAL

## 2019-08-19 VITALS
HEART RATE: 68 BPM | SYSTOLIC BLOOD PRESSURE: 110 MMHG | HEIGHT: 70 IN | BODY MASS INDEX: 34.01 KG/M2 | WEIGHT: 237.56 LBS | DIASTOLIC BLOOD PRESSURE: 70 MMHG

## 2019-08-19 DIAGNOSIS — S93.306A: ICD-10-CM

## 2019-08-19 DIAGNOSIS — L40.50 PSORIATIC ARTHRITIS: Primary | ICD-10-CM

## 2019-08-19 DIAGNOSIS — L40.9 SCALP PSORIASIS: ICD-10-CM

## 2019-08-19 PROBLEM — M20.41 HAMMER TOE OF RIGHT FOOT: Status: RESOLVED | Noted: 2019-04-16 | Resolved: 2019-08-19

## 2019-08-19 PROBLEM — M19.071 PRIMARY OSTEOARTHRITIS OF RIGHT FOOT: Status: RESOLVED | Noted: 2019-04-16 | Resolved: 2019-08-19

## 2019-08-19 PROCEDURE — 99999 PR PBB SHADOW E&M-EST. PATIENT-LVL III: CPT | Mod: PBBFAC,,, | Performed by: INTERNAL MEDICINE

## 2019-08-19 PROCEDURE — 3078F PR MOST RECENT DIASTOLIC BLOOD PRESSURE < 80 MM HG: ICD-10-PCS | Mod: CPTII,S$GLB,, | Performed by: INTERNAL MEDICINE

## 2019-08-19 PROCEDURE — 3008F BODY MASS INDEX DOCD: CPT | Mod: CPTII,S$GLB,, | Performed by: INTERNAL MEDICINE

## 2019-08-19 PROCEDURE — 99214 PR OFFICE/OUTPT VISIT, EST, LEVL IV, 30-39 MIN: ICD-10-PCS | Mod: S$GLB,,, | Performed by: INTERNAL MEDICINE

## 2019-08-19 PROCEDURE — 3078F DIAST BP <80 MM HG: CPT | Mod: CPTII,S$GLB,, | Performed by: INTERNAL MEDICINE

## 2019-08-19 PROCEDURE — 3074F PR MOST RECENT SYSTOLIC BLOOD PRESSURE < 130 MM HG: ICD-10-PCS | Mod: CPTII,S$GLB,, | Performed by: INTERNAL MEDICINE

## 2019-08-19 PROCEDURE — 3074F SYST BP LT 130 MM HG: CPT | Mod: CPTII,S$GLB,, | Performed by: INTERNAL MEDICINE

## 2019-08-19 PROCEDURE — 3008F PR BODY MASS INDEX (BMI) DOCUMENTED: ICD-10-PCS | Mod: CPTII,S$GLB,, | Performed by: INTERNAL MEDICINE

## 2019-08-19 PROCEDURE — 99999 PR PBB SHADOW E&M-EST. PATIENT-LVL III: ICD-10-PCS | Mod: PBBFAC,,, | Performed by: INTERNAL MEDICINE

## 2019-08-19 PROCEDURE — 99214 OFFICE O/P EST MOD 30 MIN: CPT | Mod: S$GLB,,, | Performed by: INTERNAL MEDICINE

## 2019-08-19 RX ORDER — CIPROFLOXACIN 250 MG/1
250 TABLET, FILM COATED ORAL
COMMUNITY
End: 2019-11-13

## 2019-08-19 RX ORDER — LEFLUNOMIDE 20 MG/1
20 TABLET ORAL DAILY
Qty: 30 TABLET | Refills: 2 | Status: SHIPPED | OUTPATIENT
Start: 2019-08-19 | End: 2019-11-13

## 2019-08-19 ASSESSMENT — ROUTINE ASSESSMENT OF PATIENT INDEX DATA (RAPID3)
PATIENT GLOBAL ASSESSMENT SCORE: 4
PAIN SCORE: 4
TOTAL RAPID3 SCORE: 2.89
PSYCHOLOGICAL DISTRESS SCORE: 1.1
MDHAQ FUNCTION SCORE: .2

## 2019-08-19 NOTE — PROGRESS NOTES
Subjective:          Chief Complaint: Christin Caruso is a 59 y.o. female who had concerns including Psoriatic Arthritis.    HPI:    8/19/2019:  Patient reestablish says she 2019 new diagnosis for psoriatic arthritis started methotrexate unfortunately she was having increasing hair loss thusly discontinued.  She is here today to discuss alternate treatment options.   Currently on Cipro for UTI  Skin is clear right now.   Hair improved stopping MTX. Off now x 1 months  Pain in feet at night. Feet can awaken with aches and stiffness.   Scalp with no psoriasis.         6/2019:  Patient is a 59-year-old female self-referred for joint aches and pains has been seen by me in 2016 all attributed to osteoarthritis but she did have a low titer positive NIESHA 1:160 homogeneous negative NIESHA profile.  That time last seen she was doing well with aleve for joint aches and pains.  As of 2019 she was seen Dr. Zuniga on the [place] noticing that the last 6 months of 2018 she was having a increased and progressive arthralgia now involving the entire right hand left hand was now painful still having stiffness in the morning less than 5 min for back hip and legs.    She has more recently been diagnosed with by temporal scalp psoriasis with Dr. Norma Waldrop given topical solution.  Dr. Zuniga did have an MRI done which did show some inflammatory arthritis.  There was a question of whether starting Plaquenil would work but given the risk for flaring of the psoriasis methotrexate was preferred she start MTX.     She has been on methotrexate 10 mg weekly with marked improvement in her joints    MRI 03/13/2019 shows enhancement at the 1st metacarpal with synovial articulation joint effusion increased T2 signal and a sub subluxation versus ligamentous laxity she had some cortical erosions questioned but none overt found mostly degenerative changes noted at the 1st metacarpal and carpal articulations.  She did have fluid signal intensity seen in  the flexor digitorum suggestive of a tenosynovitis and there is widening within the scapholunate ligament also suggestive of an inflammatory arthritis.  Previous serologies NIESHA + 1:160 homogenous, RF negative, ESR/CRP wnl, +TPO Ab.    REVIEW OF SYSTEMS:    Review of Systems   Constitutional: Negative for fever, malaise/fatigue and weight loss.   HENT: Negative for sore throat.    Eyes: Negative for double vision, photophobia and redness.   Respiratory: Negative for cough, shortness of breath and wheezing.    Cardiovascular: Negative for chest pain, palpitations and orthopnea.   Gastrointestinal: Negative for abdominal pain, constipation and diarrhea.   Genitourinary: Negative for dysuria, hematuria and urgency.   Musculoskeletal: Positive for joint pain. Negative for back pain and myalgias.   Skin: Negative for rash.   Neurological: Negative for dizziness, tingling, focal weakness and headaches.   Endo/Heme/Allergies: Does not bruise/bleed easily.   Psychiatric/Behavioral: Negative for depression, hallucinations and suicidal ideas.               Objective:            Past Medical History:   Diagnosis Date    Arthritis     Depression     Hypertension     Following with Dr. Craig    Hypothyroidism     MONIKA (obstructive sleep apnea)     uses cpap    Thyroid disease     goiter     Family History   Problem Relation Age of Onset    Heart disease Mother     Arthritis Mother     Diabetes Father     Heart disease Father     Diabetes Sister     Diabetes Brother     Heart disease Brother     Diabetes Paternal Aunt     Heart disease Maternal Grandfather     Cancer Neg Hx      Social History     Tobacco Use    Smoking status: Former Smoker     Packs/day: 0.15     Years: 4.00     Pack years: 0.60     Types: Cigarettes     Last attempt to quit: 10/1/2012     Years since quittin.8    Smokeless tobacco: Never Used   Substance Use Topics    Alcohol use: Yes     Alcohol/week: 0.6 oz     Types: 1 Glasses of wine  per week     Comment: socially    Drug use: No         Current Outpatient Medications on File Prior to Visit   Medication Sig Dispense Refill    AA/prot/lysine/methio/vit C/B6 (A/G PRO ORAL) Take 2 tablets by mouth once daily.       aspirin (ECOTRIN) 81 MG EC tablet Take 1 tablet (81 mg total) by mouth once daily. 30 tablet 0    atorvastatin (LIPITOR) 40 MG tablet Take 1 tablet (40 mg total) by mouth once daily. 90 tablet 3    atovaquone-proguanil (MALARONE) 250-100 mg Tab Take 1 tablet by mouth once daily. 18 tablet 0    biotin 1 mg tablet Take 1,000 mcg by mouth once daily.       ciprofloxacin HCl (CIPRO) 250 MG tablet Take 250 mg by mouth every 12 (twelve) hours.      folic acid (FOLVITE) 1 MG tablet Take 1 tablet (1 mg total) by mouth once daily. 90 tablet 3    levothyroxine (SYNTHROID) 125 MCG tablet Take 125 mcg by mouth every evening.       metoprolol succinate (TOPROL-XL) 50 MG 24 hr tablet Take 1 tablet (50 mg total) by mouth once daily. 90 tablet 1    multivitamin (DAILY MULTI-VITAMIN) per tablet Take 1 tablet by mouth once daily.       vitamin D 1000 units Tab Take 2,000 Units by mouth once daily.       [DISCONTINUED] escitalopram oxalate (LEXAPRO) 5 MG Tab TAKE 1 TABLET BY MOUTH EVERY DAY 30 tablet 1    methotrexate 2.5 MG Tab Take 5 tablets (12.5 mg total) by mouth every 7 days. 60 tablet 3    ticagrelor (BRILINTA) 90 mg tablet Take 1 tablet (90 mg total) by mouth 2 (two) times daily. 180 tablet 1    [DISCONTINUED] azithromycin (ZITHROMAX) 500 MG tablet Take 1 tablet (500 mg total) by mouth once daily. 3 tablet 0    [DISCONTINUED] typhoid (VIVOTIF) DR capsule Take 1 capsule by mouth every other day. 4 capsule 0     No current facility-administered medications on file prior to visit.        Vitals:    08/19/19 0802   BP: 110/70   Pulse: 68       Physical Exam:    Physical Exam   Constitutional: She appears well-developed and well-nourished.   HENT:   Nose: No septal deviation.    Mouth/Throat: Mucous membranes are normal. No oral lesions.   Eyes: Pupils are equal, round, and reactive to light. Right conjunctiva is not injected. Left conjunctiva is not injected.   Neck: No JVD present. No thyroid mass and no thyromegaly present.   Cardiovascular: Normal rate, regular rhythm and normal pulses.   No edema   Pulmonary/Chest: Effort normal and breath sounds normal.   Abdominal: Soft. Normal appearance. There is no hepatosplenomegaly.   Musculoskeletal:        Right shoulder: She exhibits normal range of motion, no tenderness and no swelling.        Left shoulder: She exhibits normal range of motion, no tenderness and no swelling.        Right elbow: She exhibits normal range of motion and no swelling. No tenderness found.        Left elbow: She exhibits normal range of motion and no swelling. No tenderness found.        Right wrist: She exhibits decreased range of motion and tenderness. She exhibits no swelling.        Left wrist: She exhibits decreased range of motion, tenderness and bony tenderness. She exhibits no swelling.        Right hip: She exhibits normal range of motion, normal strength and no swelling.        Left hip: She exhibits normal range of motion, no tenderness and no swelling.        Right knee: She exhibits normal range of motion and no swelling. No tenderness found.        Left knee: She exhibits normal range of motion and no swelling. No tenderness found.        Right ankle: She exhibits normal range of motion and no swelling. No tenderness.        Left ankle: She exhibits normal range of motion and no swelling. No tenderness.   Right 3rd IP with subluxation and bony hypertrophy with loss of flexion. + large heberden nodes b/l. Right hand with more significant deformity. 1+ synovitis unalble to finger curl.  impaired.   Lymphadenopathy:     She has no cervical adenopathy.     She has no axillary adenopathy.   Neurological: She has normal strength and normal reflexes.    Skin: Skin is dry and intact.   Psychiatric: She has a normal mood and affect.             Assessment:       Encounter Diagnoses   Name Primary?    Psoriatic arthritis Yes    Scalp psoriasis     Subluxation of metatarsal - Right Foot           Plan:      +NIESHA: I suspect this is secondary to thyroid disease with + TPO in 2012. No evidence of CTD.    Primary osteoarthritis involving multiple joints      Spondylosis of lumbar region without myelopathy or radiculopathy        - Imaging lumbar spine.    Impingement syndrome, shoulder, right       -     Very pleasant 54 y/o female with a prior polyarthritis, more recently dx with Ps. Diagnosis PsA.   Failed MTX  Start Arava after cipro completed.   Increase in mid foot pain s/p buniectomy 5 years ago still painful.       Follow up in about 4 months (around 12/19/2019) for keep other f/u.    30min consultation with greater than 50% spent in counseling, chart review and coordination of care. All questions answered.

## 2019-09-12 RX ORDER — LEVOTHYROXINE SODIUM 125 UG/1
125 TABLET ORAL NIGHTLY
Qty: 30 TABLET | Refills: 0 | Status: CANCELLED | OUTPATIENT
Start: 2019-09-12

## 2019-09-12 RX ORDER — LEVOTHYROXINE SODIUM 137 UG/1
137 TABLET ORAL
Qty: 30 TABLET | Refills: 5 | Status: SHIPPED | OUTPATIENT
Start: 2019-09-12 | End: 2020-01-23 | Stop reason: SDUPTHER

## 2019-09-12 NOTE — TELEPHONE ENCOUNTER
Last office visit was with Dr. Gonsalez on 06/12/19. Requesting a refill on Levothyroxine 137mcg. Please advise because patient is taking 125 mcg per her chart.

## 2019-09-12 NOTE — TELEPHONE ENCOUNTER
Please call patient to confirm what she is actually taking for this dose. Her last TSH was normal, so I want to continue the dose she has been taking.

## 2019-11-08 ENCOUNTER — LAB VISIT (OUTPATIENT)
Dept: LAB | Facility: HOSPITAL | Age: 59
End: 2019-11-08
Attending: INTERNAL MEDICINE
Payer: COMMERCIAL

## 2019-11-08 DIAGNOSIS — L40.50 PSORIATIC ARTHRITIS: ICD-10-CM

## 2019-11-08 LAB
ALBUMIN SERPL BCP-MCNC: 4 G/DL (ref 3.5–5.2)
ALP SERPL-CCNC: 84 U/L (ref 55–135)
ALT SERPL W/O P-5'-P-CCNC: 28 U/L (ref 10–44)
ANION GAP SERPL CALC-SCNC: 10 MMOL/L (ref 8–16)
AST SERPL-CCNC: 29 U/L (ref 10–40)
BASOPHILS # BLD AUTO: 0.04 K/UL (ref 0–0.2)
BASOPHILS NFR BLD: 0.7 % (ref 0–1.9)
BILIRUB SERPL-MCNC: 1.3 MG/DL (ref 0.1–1)
BUN SERPL-MCNC: 17 MG/DL (ref 6–20)
CALCIUM SERPL-MCNC: 9 MG/DL (ref 8.7–10.5)
CHLORIDE SERPL-SCNC: 106 MMOL/L (ref 95–110)
CO2 SERPL-SCNC: 27 MMOL/L (ref 23–29)
CREAT SERPL-MCNC: 0.7 MG/DL (ref 0.5–1.4)
CRP SERPL-MCNC: 1.6 MG/L (ref 0–8.2)
DIFFERENTIAL METHOD: ABNORMAL
EOSINOPHIL # BLD AUTO: 0.1 K/UL (ref 0–0.5)
EOSINOPHIL NFR BLD: 1.6 % (ref 0–8)
ERYTHROCYTE [DISTWIDTH] IN BLOOD BY AUTOMATED COUNT: 12.5 % (ref 11.5–14.5)
ERYTHROCYTE [SEDIMENTATION RATE] IN BLOOD BY WESTERGREN METHOD: 9 MM/HR (ref 0–20)
EST. GFR  (AFRICAN AMERICAN): >60 ML/MIN/1.73 M^2
EST. GFR  (NON AFRICAN AMERICAN): >60 ML/MIN/1.73 M^2
GLUCOSE SERPL-MCNC: 96 MG/DL (ref 70–110)
HCT VFR BLD AUTO: 43.7 % (ref 37–48.5)
HGB BLD-MCNC: 13.9 G/DL (ref 12–16)
IMM GRANULOCYTES # BLD AUTO: 0.02 K/UL (ref 0–0.04)
IMM GRANULOCYTES NFR BLD AUTO: 0.4 % (ref 0–0.5)
LYMPHOCYTES # BLD AUTO: 1.9 K/UL (ref 1–4.8)
LYMPHOCYTES NFR BLD: 34.3 % (ref 18–48)
MCH RBC QN AUTO: 29.3 PG (ref 27–31)
MCHC RBC AUTO-ENTMCNC: 31.8 G/DL (ref 32–36)
MCV RBC AUTO: 92 FL (ref 82–98)
MONOCYTES # BLD AUTO: 0.4 K/UL (ref 0.3–1)
MONOCYTES NFR BLD: 6.4 % (ref 4–15)
NEUTROPHILS # BLD AUTO: 3.2 K/UL (ref 1.8–7.7)
NEUTROPHILS NFR BLD: 56.6 % (ref 38–73)
NRBC BLD-RTO: 0 /100 WBC
PLATELET # BLD AUTO: 216 K/UL (ref 150–350)
PMV BLD AUTO: 11.3 FL (ref 9.2–12.9)
POTASSIUM SERPL-SCNC: 4.6 MMOL/L (ref 3.5–5.1)
PROT SERPL-MCNC: 6.8 G/DL (ref 6–8.4)
RBC # BLD AUTO: 4.75 M/UL (ref 4–5.4)
SODIUM SERPL-SCNC: 143 MMOL/L (ref 136–145)
WBC # BLD AUTO: 5.6 K/UL (ref 3.9–12.7)

## 2019-11-08 PROCEDURE — 36415 COLL VENOUS BLD VENIPUNCTURE: CPT | Mod: PO

## 2019-11-08 PROCEDURE — 86140 C-REACTIVE PROTEIN: CPT

## 2019-11-08 PROCEDURE — 80053 COMPREHEN METABOLIC PANEL: CPT

## 2019-11-08 PROCEDURE — 85651 RBC SED RATE NONAUTOMATED: CPT | Mod: PO

## 2019-11-08 PROCEDURE — 85025 COMPLETE CBC W/AUTO DIFF WBC: CPT

## 2019-11-11 ENCOUNTER — OFFICE VISIT (OUTPATIENT)
Dept: RHEUMATOLOGY | Facility: CLINIC | Age: 59
End: 2019-11-11
Payer: COMMERCIAL

## 2019-11-11 VITALS
DIASTOLIC BLOOD PRESSURE: 88 MMHG | BODY MASS INDEX: 34.32 KG/M2 | SYSTOLIC BLOOD PRESSURE: 148 MMHG | HEIGHT: 70 IN | HEART RATE: 64 BPM | WEIGHT: 239.75 LBS

## 2019-11-11 DIAGNOSIS — M19.041 OSTEOARTHRITIS OF FINGERS OF HANDS, BILATERAL: ICD-10-CM

## 2019-11-11 DIAGNOSIS — S93.306A: ICD-10-CM

## 2019-11-11 DIAGNOSIS — M79.671 PAIN IN RIGHT FOOT: ICD-10-CM

## 2019-11-11 DIAGNOSIS — L40.50 PSORIATIC ARTHRITIS: Primary | ICD-10-CM

## 2019-11-11 DIAGNOSIS — M19.042 OSTEOARTHRITIS OF FINGERS OF HANDS, BILATERAL: ICD-10-CM

## 2019-11-11 DIAGNOSIS — L40.9 PSORIASIS: ICD-10-CM

## 2019-11-11 PROCEDURE — 3079F PR MOST RECENT DIASTOLIC BLOOD PRESSURE 80-89 MM HG: ICD-10-PCS | Mod: CPTII,S$GLB,, | Performed by: INTERNAL MEDICINE

## 2019-11-11 PROCEDURE — 99215 PR OFFICE/OUTPT VISIT, EST, LEVL V, 40-54 MIN: ICD-10-PCS | Mod: S$GLB,,, | Performed by: INTERNAL MEDICINE

## 2019-11-11 PROCEDURE — 3008F BODY MASS INDEX DOCD: CPT | Mod: CPTII,S$GLB,, | Performed by: INTERNAL MEDICINE

## 2019-11-11 PROCEDURE — 99999 PR PBB SHADOW E&M-EST. PATIENT-LVL III: CPT | Mod: PBBFAC,,, | Performed by: INTERNAL MEDICINE

## 2019-11-11 PROCEDURE — 99999 PR PBB SHADOW E&M-EST. PATIENT-LVL III: ICD-10-PCS | Mod: PBBFAC,,, | Performed by: INTERNAL MEDICINE

## 2019-11-11 PROCEDURE — 3079F DIAST BP 80-89 MM HG: CPT | Mod: CPTII,S$GLB,, | Performed by: INTERNAL MEDICINE

## 2019-11-11 PROCEDURE — 3077F PR MOST RECENT SYSTOLIC BLOOD PRESSURE >= 140 MM HG: ICD-10-PCS | Mod: CPTII,S$GLB,, | Performed by: INTERNAL MEDICINE

## 2019-11-11 PROCEDURE — 99215 OFFICE O/P EST HI 40 MIN: CPT | Mod: S$GLB,,, | Performed by: INTERNAL MEDICINE

## 2019-11-11 PROCEDURE — 3077F SYST BP >= 140 MM HG: CPT | Mod: CPTII,S$GLB,, | Performed by: INTERNAL MEDICINE

## 2019-11-11 PROCEDURE — 3008F PR BODY MASS INDEX (BMI) DOCUMENTED: ICD-10-PCS | Mod: CPTII,S$GLB,, | Performed by: INTERNAL MEDICINE

## 2019-11-11 ASSESSMENT — ROUTINE ASSESSMENT OF PATIENT INDEX DATA (RAPID3)
PATIENT GLOBAL ASSESSMENT SCORE: 2
TOTAL RAPID3 SCORE: 2.06
PSYCHOLOGICAL DISTRESS SCORE: 3.3
MDHAQ FUNCTION SCORE: .2
PAIN SCORE: 3.5

## 2019-11-11 NOTE — PROGRESS NOTES
Subjective:          Chief Complaint: Christin Caruso is a 59 y.o. female who had concerns including Psoriatic Arthritis.    HPI:    8/19/2019:   2019 new diagnosis for psoriatic arthritis started methotrexate unfortunately she was having increasing hair loss thusly discontinued 7/2019.  She is here today to discuss alternate treatment options.       Hair improved stopping MTX. Off now x 4 months  She was having additional pain in her feet particularly at night that could wake her still some stiffness in her hands but not as much as before and elected since August to not take leflunomide  concern of ASE.   Scalp with  psoriasis.  Mild singular plaque noted  She has some morning stiffness nothing that exceeds 20 min      MRI 03/13/2019 shows enhancement at the 1st metacarpal with synovial articulation joint effusion increased T2 signal and a sub subluxation versus ligamentous laxity she had some cortical erosions questioned but none overt found mostly degenerative changes noted at the 1st metacarpal and carpal articulations.  She did have fluid signal intensity seen in the flexor digitorum suggestive of a tenosynovitis and there is widening within the scapholunate ligament also suggestive of an inflammatory arthritis.    6/2019:  Patient is a 59-year-old female self-referred for joint aches and pains has been seen by me in 2016 all attributed to osteoarthritis but she did have a low titer positive NIESHA 1:160 homogeneous negative NIESHA profile.  That time last seen she was doing well with aleve for joint aches and pains.  As of 2019 she was seen Dr. Zuniga on the [place] noticing that the last 6 months of 2018 she was having a increased and progressive arthralgia now involving the entire right hand left hand was now painful still having stiffness in the morning less than 5 min for back hip and legs.    She has more recently been diagnosed with by temporal scalp psoriasis with Dr. Norma Waldrop given topical solution.   Dr. Zuniga did have an MRI done which did show some inflammatory arthritis.  There was a question of whether starting Plaquenil would work but given the risk for flaring of the psoriasis methotrexate was preferred she start MTX.     She has been on methotrexate 10 mg weekly with marked improvement in her joints    Previous serologies NIESHA + 1:160 homogenous, RF negative, ESR/CRP wnl, +TPO Ab.    REVIEW OF SYSTEMS:    Review of Systems   Constitutional: Negative for fever, malaise/fatigue and weight loss.   HENT: Negative for sore throat.    Eyes: Negative for double vision, photophobia and redness.   Respiratory: Negative for cough, shortness of breath and wheezing.    Cardiovascular: Negative for chest pain, palpitations and orthopnea.   Gastrointestinal: Negative for abdominal pain, constipation and diarrhea.   Genitourinary: Negative for dysuria, hematuria and urgency.   Musculoskeletal: Positive for joint pain. Negative for back pain and myalgias.   Skin: Negative for rash.   Neurological: Negative for dizziness, tingling, focal weakness and headaches.   Endo/Heme/Allergies: Does not bruise/bleed easily.   Psychiatric/Behavioral: Negative for depression, hallucinations and suicidal ideas.               Objective:            Past Medical History:   Diagnosis Date    Arthritis     Depression     Hypertension     Following with Dr. Craig    Hypothyroidism     MONIKA (obstructive sleep apnea)     uses cpap    Thyroid disease     goiter     Family History   Problem Relation Age of Onset    Heart disease Mother     Arthritis Mother     Diabetes Father     Heart disease Father     Diabetes Sister     Diabetes Brother     Heart disease Brother     Diabetes Paternal Aunt     Heart disease Maternal Grandfather     Cancer Neg Hx      Social History     Tobacco Use    Smoking status: Former Smoker     Packs/day: 0.15     Years: 4.00     Pack years: 0.60     Types: Cigarettes     Last attempt to quit: 10/1/2012      Years since quittin.1    Smokeless tobacco: Never Used   Substance Use Topics    Alcohol use: Yes     Alcohol/week: 1.0 standard drinks     Types: 1 Glasses of wine per week     Comment: socially    Drug use: No         Current Outpatient Medications on File Prior to Visit   Medication Sig Dispense Refill    AA/prot/lysine/methio/vit C/B6 (A/G PRO ORAL) Take 2 tablets by mouth once daily.       aspirin (ECOTRIN) 81 MG EC tablet Take 1 tablet (81 mg total) by mouth once daily. 30 tablet 0    atorvastatin (LIPITOR) 40 MG tablet Take 1 tablet (40 mg total) by mouth once daily. 90 tablet 3    biotin 1 mg tablet Take 1,000 mcg by mouth once daily.       clopidogrel (PLAVIX) 75 mg tablet Take 75 mg by mouth once daily.      levothyroxine (SYNTHROID) 137 MCG Tab tablet Take 1 tablet (137 mcg total) by mouth before breakfast. 30 tablet 5    metoprolol succinate (TOPROL-XL) 50 MG 24 hr tablet TAKE 1 TABLET BY MOUTH EVERY DAY 30 tablet 1    multivitamin (DAILY MULTI-VITAMIN) per tablet Take 1 tablet by mouth once daily.       vitamin D 1000 units Tab Take 2,000 Units by mouth once daily.       atovaquone-proguanil (MALARONE) 250-100 mg Tab Take 1 tablet by mouth once daily. 18 tablet 0    ciprofloxacin HCl (CIPRO) 250 MG tablet Take 250 mg by mouth every 12 (twelve) hours.      leflunomide (ARAVA) 20 MG Tab Take 1 tablet (20 mg total) by mouth once daily. 30 tablet 2     No current facility-administered medications on file prior to visit.        Vitals:    19 0901   BP: (!) 148/88   Pulse: 64       Physical Exam:    Physical Exam   Constitutional: She appears well-developed and well-nourished.   HENT:   Nose: No septal deviation.   Mouth/Throat: Mucous membranes are normal. No oral lesions.   Eyes: Pupils are equal, round, and reactive to light. Right conjunctiva is not injected. Left conjunctiva is not injected.   Neck: No JVD present. No thyroid mass and no thyromegaly present.   Cardiovascular:  Normal rate, regular rhythm and normal pulses.   No edema   Pulmonary/Chest: Effort normal and breath sounds normal.   Abdominal: Soft. Normal appearance. There is no hepatosplenomegaly.   Musculoskeletal:        Right shoulder: She exhibits normal range of motion, no tenderness and no swelling.        Left shoulder: She exhibits normal range of motion, no tenderness and no swelling.        Right elbow: She exhibits normal range of motion and no swelling. No tenderness found.        Left elbow: She exhibits normal range of motion and no swelling. No tenderness found.        Right wrist: She exhibits decreased range of motion and tenderness. She exhibits no swelling.        Left wrist: She exhibits decreased range of motion, tenderness and bony tenderness. She exhibits no swelling.        Right hip: She exhibits normal range of motion, normal strength and no swelling.        Left hip: She exhibits normal range of motion, no tenderness and no swelling.        Right knee: She exhibits normal range of motion and no swelling. No tenderness found.        Left knee: She exhibits normal range of motion and no swelling. No tenderness found.        Right ankle: She exhibits normal range of motion and no swelling. No tenderness.        Left ankle: She exhibits normal range of motion and no swelling. No tenderness.   Right 3rd IP with subluxation and bony hypertrophy with loss of flexion. + large heberden nodes b/l. Right hand with more significant deformity. 1+ synovitis unalble to finger curl.  impaired.   Lymphadenopathy:     She has no cervical adenopathy.     She has no axillary adenopathy.   Neurological: She has normal strength and normal reflexes.   Skin: Skin is dry and intact.   Psychiatric: She has a normal mood and affect.             Assessment:       Encounter Diagnoses   Name Primary?    Psoriatic arthritis Yes    Psoriasis     Subluxation of metatarsal - Right Foot     Pain in right foot - Right Foot      Osteoarthritis of fingers of hands, bilateral           Plan:          Very pleasant 58 y/o female with a prior polyarthritis, more recently dx with Ps.   2019 w/ Diagnosis PsA.     Failed MTX-hair growing back  Attempted start leflunomide patient did not use for concern of ASE.   Now stating that her joint pain is a 0/10  Long discussion that I think we can monitor her over the next 4 months if noted decline in functional ability increased swelling of any joints with the exception of her known right 3rd PIP that has loss of motion then we can monitor without DMARD therapy.  Discussed the risk of untreated psoriatic arthritis and inflammatory arthritis could be a progression and ultimate loss of joint function. on her MRI and I think periodic and frequent monitoring of her function would be reasonable.  We will set a low threshold to challenge with leflunomide or even consider a biologic      Increase in mid foot pain s/p buniectomy 5 years ago still painful.       Follow up in about 4 months (around 3/11/2020).    40min consultation with greater than 50% spent in counseling, chart review and coordination of care. All questions answered.

## 2019-11-14 RX ORDER — LEFLUNOMIDE 20 MG/1
TABLET ORAL
Qty: 90 TABLET | Refills: 0 | OUTPATIENT
Start: 2019-11-14

## 2019-11-14 NOTE — TELEPHONE ENCOUNTER
Patient just elected to not start. Why is [employer] sending this.   I have discontinued   Dr. Brown

## 2019-12-23 ENCOUNTER — PATIENT MESSAGE (OUTPATIENT)
Dept: RHEUMATOLOGY | Facility: CLINIC | Age: 59
End: 2019-12-23

## 2020-01-13 ENCOUNTER — TELEPHONE (OUTPATIENT)
Dept: FAMILY MEDICINE | Facility: CLINIC | Age: 60
End: 2020-01-13

## 2020-01-13 DIAGNOSIS — F43.21 GRIEF REACTION: ICD-10-CM

## 2020-01-13 DIAGNOSIS — F41.8 SITUATIONAL ANXIETY: Primary | ICD-10-CM

## 2020-01-13 RX ORDER — LORAZEPAM 0.5 MG/1
0.5 TABLET ORAL EVERY 6 HOURS PRN
Qty: 20 TABLET | Refills: 0 | Status: SHIPPED | OUTPATIENT
Start: 2020-01-13 | End: 2020-01-23

## 2020-01-13 NOTE — TELEPHONE ENCOUNTER
----- Message from Tasha Whitman sent at 1/13/2020  2:14 PM CST -----  Contact: self  Type: Needs Medical Advice    Who Called:  self  Symptoms (please be specific):    How long has patient had these symptoms:    Pharmacy name and phone #:    [id number] [place] - [address]  [address]  [place] [address]  Phone: [phone] Fax: [phone]  Best Call Back Number: [phone] (home)   Additional Information: Patient would like a prescription for anxiety. She lost her son yesterday. Please call patient. Thanks!

## 2020-01-13 NOTE — TELEPHONE ENCOUNTER
Tried to call patient, no answer, no option to leave VM. Rx sent for ativan PRN. If able to get in touch with patient, please let her know this was sent and offer my condolences.

## 2020-01-13 NOTE — TELEPHONE ENCOUNTER
Spoke with pt, she is asking for something for anxiety. She lost her son yesterday and needs something to get through the next few days

## 2020-01-21 DIAGNOSIS — F41.8 SITUATIONAL ANXIETY: ICD-10-CM

## 2020-01-21 DIAGNOSIS — F43.21 GRIEF REACTION: ICD-10-CM

## 2020-01-21 NOTE — TELEPHONE ENCOUNTER
Please ask how often she is taking this, I had just sent 20 pills a week ago. I know she is going through a really hard time, but I don't want her taking this more than really needed, I expected that 20 pills to last a few weeks at least.

## 2020-01-21 NOTE — TELEPHONE ENCOUNTER
----- Message from Prachi Lin sent at 1/21/2020  4:04 PM CST -----  Contact: Patient  Type:  RX Refill Request    Who Called:  Patient  Refill or New Rx:  refill  RX Name and Strength:  LORazepam (ATIVAN) 0.5 MG tablet  How is the patient currently taking it? (ex. 1XDay): 4xday    Is this a 30 day or 90 day RX:  30 day  Preferred Pharmacy with phone number:    [id number] - [place] - [address]  [address]  [place] [address]  Phone: [phone] Fax: [phone]  Local or Mail Order:  Local  Ordering Provider:Wallace Tavares Call Back Number: [phone]

## 2020-01-22 RX ORDER — LORAZEPAM 0.5 MG/1
0.5 TABLET ORAL EVERY 6 HOURS PRN
Qty: 20 TABLET | Refills: 0 | OUTPATIENT
Start: 2020-01-22 | End: 2020-02-21

## 2020-01-22 NOTE — TELEPHONE ENCOUNTER
Spoke with pt, states she has two pills left, states she does not take them everyday, but on days she needs to take them, she takes them as around the clock, pt does not want to continue on habit forming medication, pt scheduled an appt to discuss something more long term that is non habit forming.

## 2020-01-23 ENCOUNTER — OFFICE VISIT (OUTPATIENT)
Dept: FAMILY MEDICINE | Facility: CLINIC | Age: 60
End: 2020-01-23
Payer: COMMERCIAL

## 2020-01-23 VITALS
BODY MASS INDEX: 35.48 KG/M2 | WEIGHT: 247.81 LBS | RESPIRATION RATE: 18 BRPM | SYSTOLIC BLOOD PRESSURE: 122 MMHG | HEIGHT: 70 IN | HEART RATE: 69 BPM | DIASTOLIC BLOOD PRESSURE: 82 MMHG | OXYGEN SATURATION: 98 %

## 2020-01-23 DIAGNOSIS — F41.8 SITUATIONAL ANXIETY: ICD-10-CM

## 2020-01-23 DIAGNOSIS — F43.21 GRIEF REACTION: ICD-10-CM

## 2020-01-23 DIAGNOSIS — F41.9 ANXIETY: ICD-10-CM

## 2020-01-23 DIAGNOSIS — F32.0 MILD MAJOR DEPRESSION: Primary | ICD-10-CM

## 2020-01-23 DIAGNOSIS — E03.9 ACQUIRED HYPOTHYROIDISM: ICD-10-CM

## 2020-01-23 PROCEDURE — 3079F PR MOST RECENT DIASTOLIC BLOOD PRESSURE 80-89 MM HG: ICD-10-PCS | Mod: CPTII,S$GLB,, | Performed by: INTERNAL MEDICINE

## 2020-01-23 PROCEDURE — 99999 PR PBB SHADOW E&M-EST. PATIENT-LVL III: ICD-10-PCS | Mod: PBBFAC,,, | Performed by: INTERNAL MEDICINE

## 2020-01-23 PROCEDURE — 99214 OFFICE O/P EST MOD 30 MIN: CPT | Mod: S$GLB,,, | Performed by: INTERNAL MEDICINE

## 2020-01-23 PROCEDURE — 99214 PR OFFICE/OUTPT VISIT, EST, LEVL IV, 30-39 MIN: ICD-10-PCS | Mod: S$GLB,,, | Performed by: INTERNAL MEDICINE

## 2020-01-23 PROCEDURE — 3008F PR BODY MASS INDEX (BMI) DOCUMENTED: ICD-10-PCS | Mod: CPTII,S$GLB,, | Performed by: INTERNAL MEDICINE

## 2020-01-23 PROCEDURE — 3074F SYST BP LT 130 MM HG: CPT | Mod: CPTII,S$GLB,, | Performed by: INTERNAL MEDICINE

## 2020-01-23 PROCEDURE — 3008F BODY MASS INDEX DOCD: CPT | Mod: CPTII,S$GLB,, | Performed by: INTERNAL MEDICINE

## 2020-01-23 PROCEDURE — 99999 PR PBB SHADOW E&M-EST. PATIENT-LVL III: CPT | Mod: PBBFAC,,, | Performed by: INTERNAL MEDICINE

## 2020-01-23 PROCEDURE — 3074F PR MOST RECENT SYSTOLIC BLOOD PRESSURE < 130 MM HG: ICD-10-PCS | Mod: CPTII,S$GLB,, | Performed by: INTERNAL MEDICINE

## 2020-01-23 PROCEDURE — 3079F DIAST BP 80-89 MM HG: CPT | Mod: CPTII,S$GLB,, | Performed by: INTERNAL MEDICINE

## 2020-01-23 RX ORDER — LORAZEPAM 0.5 MG/1
0.5 TABLET ORAL EVERY 6 HOURS PRN
Qty: 20 TABLET | Refills: 0 | Status: SHIPPED | OUTPATIENT
Start: 2020-01-23 | End: 2022-03-03

## 2020-01-23 RX ORDER — LEVOTHYROXINE SODIUM 100 UG/1
100 TABLET ORAL
Qty: 30 TABLET | Refills: 5 | Status: SHIPPED | OUTPATIENT
Start: 2020-01-23 | End: 2020-06-17

## 2020-01-23 RX ORDER — BUPROPION HYDROCHLORIDE 150 MG/1
150 TABLET ORAL DAILY
Qty: 30 TABLET | Refills: 5 | Status: SHIPPED | OUTPATIENT
Start: 2020-01-23 | End: 2020-04-07 | Stop reason: SDUPTHER

## 2020-01-23 NOTE — PROGRESS NOTES
Assessment and Plan:    1. Mild major depression  - buPROPion (WELLBUTRIN XL) 150 MG TB24 tablet; Take 1 tablet (150 mg total) by mouth once daily.  Dispense: 30 tablet; Refill: 5    2. Anxiety  - buPROPion (WELLBUTRIN XL) 150 MG TB24 tablet; Take 1 tablet (150 mg total) by mouth once daily.  Dispense: 30 tablet; Refill: 5    3. Situational anxiety  - LORazepam (ATIVAN) 0.5 MG tablet; Take 1 tablet (0.5 mg total) by mouth every 6 (six) hours as needed for Anxiety.  Dispense: 20 tablet; Refill: 0    4. Grief reaction  - LORazepam (ATIVAN) 0.5 MG tablet; Take 1 tablet (0.5 mg total) by mouth every 6 (six) hours as needed for Anxiety.  Dispense: 20 tablet; Refill: 0    Discussed many different medication options for her current symptoms and discussed all previous medications she has tried, and have elected to start wellbutrin. Reviewed potential medication interactions and will monitor BP closely. We discussed how to take this medication and the likely time to effect of this medication. We discussed potential side effects and to let me know if she is having any of these side effects. Follow up in 1 month. OK to continue ativan as needed with the goal of the need for this decreasing over time.     She was also encouraged to see a counselor as she is planning, and we discussed resources for this in the community.     5. Acquired hypothyroidism  Will try lower dose levothyroxine pills as she has not been taking the full dose. Will recheck TSH in 2 months.   - levothyroxine (SYNTHROID) 100 MCG tablet; Take 1 tablet (100 mcg total) by mouth before breakfast.  Dispense: 30 tablet; Refill: 5    ______________________________________________________________________  Subjective:    Chief Complaint:  Discuss medications for depression and anxiety    HPI:  Christin is a 60 y.o. year old female here to discuss medications for depression and anxiety after her son passed recently.     She reports that her son who had been struggling  with substance abuse  from overdose on . She has been dealing with a lot of feelings of depressed mood, increased anxiety, tearfulness, and grief. She reports that all she wants to do is cry and eat. She has spoken to her  and he is working on getting her set up with a grief counselor.     She had taken lexapro in the past and did decently well with this, but reports that after the most recent time we had restarted this in  it caused her BP to increase so she had stopped taking it. She had been prescribed sertraline at one point in , but had some weight gain with this. She had been prescribed cymablta in 2017, but notes that this made her feel more tearful.     She would also like to discuss levothyroxine dosing. She reports that she had not been taking the full dose as she felt it was making her heart race. She has been breaking the pills and taking variable amounts. She would like to try a lower dose. She thinks the dose was increased when she had not been taking medication consistently.     Medications:  Current Outpatient Medications on File Prior to Visit   Medication Sig Dispense Refill    AA/prot/lysine/methio/vit C/B6 (A/G PRO ORAL) Take 2 tablets by mouth once daily.       atorvastatin (LIPITOR) 40 MG tablet Take 1 tablet (40 mg total) by mouth once daily. 90 tablet 3    clopidogrel (PLAVIX) 75 mg tablet Take 75 mg by mouth once daily.      metoprolol succinate (TOPROL-XL) 50 MG 24 hr tablet TAKE 1 TABLET BY MOUTH EVERY DAY 30 tablet 11    multivitamin (DAILY MULTI-VITAMIN) per tablet Take 1 tablet by mouth once daily.       vitamin D 1000 units Tab Take 2,000 Units by mouth once daily.       [DISCONTINUED] levothyroxine (SYNTHROID) 137 MCG Tab tablet Take 1 tablet (137 mcg total) by mouth before breakfast. 30 tablet 5    [DISCONTINUED] LORazepam (ATIVAN) 0.5 MG tablet Take 1 tablet (0.5 mg total) by mouth every 6 (six) hours as needed for Anxiety. (Patient not taking: Reported  "on 1/23/2020) 20 tablet 0     No current facility-administered medications on file prior to visit.        Review of Systems:  Review of Systems   Constitutional: Positive for appetite change (increased). Negative for activity change.   Neurological: Negative for dizziness and light-headedness.   Psychiatric/Behavioral: Positive for decreased concentration, dysphoric mood and sleep disturbance. Negative for confusion, self-injury and suicidal ideas. The patient is nervous/anxious.        Past Medical History:  Past Medical History:   Diagnosis Date    Arthritis     Depression     Hypertension     Following with Dr. Craig    Hypothyroidism     MONIKA (obstructive sleep apnea)     uses cpap    Thyroid disease     goiter       Objective:    Vitals:  Vitals:    01/23/20 0953   BP: 122/82   Pulse: 69   Resp: 18   SpO2: 98%   Weight: 112.4 kg (247 lb 12.8 oz)   Height: 5' 10" (1.778 m)   PainSc: 0-No pain       Physical Exam   Constitutional: She is oriented to person, place, and time. She appears well-developed and well-nourished. No distress.   HENT:   Mouth/Throat: Oropharynx is clear and moist.   Eyes: No scleral icterus.   Cardiovascular: Normal rate and regular rhythm.   Pulmonary/Chest: Effort normal and breath sounds normal. No respiratory distress. She has no wheezes.   Neurological: She is alert and oriented to person, place, and time.   Skin: Skin is warm and dry.   Psychiatric: Her behavior is normal. Judgment and thought content normal.   intermittently tearful but appropriate mood and affect for conversation and situation   Vitals reviewed.      Data:  Previous labs reviewed and pertinent for TSH WNL in August.      Judi Gonsalez MD  Internal Medicine  "

## 2020-02-11 ENCOUNTER — PATIENT OUTREACH (OUTPATIENT)
Dept: ADMINISTRATIVE | Facility: OTHER | Age: 60
End: 2020-02-11

## 2020-02-11 DIAGNOSIS — Z12.31 BREAST CANCER SCREENING BY MAMMOGRAM: Primary | ICD-10-CM

## 2020-02-13 ENCOUNTER — OFFICE VISIT (OUTPATIENT)
Dept: OPTOMETRY | Facility: CLINIC | Age: 60
End: 2020-02-13
Payer: COMMERCIAL

## 2020-02-13 DIAGNOSIS — H04.123 BILATERAL DRY EYES: Primary | ICD-10-CM

## 2020-02-13 DIAGNOSIS — H25.13 NUCLEAR SCLEROSIS OF BOTH EYES: ICD-10-CM

## 2020-02-13 PROCEDURE — 99999 PR PBB SHADOW E&M-EST. PATIENT-LVL II: ICD-10-PCS | Mod: PBBFAC,,, | Performed by: OPTOMETRIST

## 2020-02-13 PROCEDURE — 92004 PR EYE EXAM, NEW PATIENT,COMPREHESV: ICD-10-PCS | Mod: S$GLB,,, | Performed by: OPTOMETRIST

## 2020-02-13 PROCEDURE — 99999 PR PBB SHADOW E&M-EST. PATIENT-LVL II: CPT | Mod: PBBFAC,,, | Performed by: OPTOMETRIST

## 2020-02-13 PROCEDURE — 92004 COMPRE OPH EXAM NEW PT 1/>: CPT | Mod: S$GLB,,, | Performed by: OPTOMETRIST

## 2020-02-13 NOTE — PROGRESS NOTES
HPI     Concerns About Ocular Health      Additional comments: Ocular health exam              Comments     DLE: x yrs    Pt states eyes bother her sometimes, pain sometimes and tearing OD but   doing well today. Uses OTC readers for near x 6 yrs. Doing ok at dist. +   occ floaters, no flashes. Eyes feel tired          Last edited by Aston Herrera, OD on 2/13/2020 12:00 PM. (History)            Assessment /Plan     For exam results, see Encounter Report.    Bilateral dry eyes    Nuclear sclerosis of both eyes      1. Recommend artificial tears. 1 drop 2x per day. Chronicity of disease and treatment discussed.  2. Educated pt on presence of cataracts and effects on vision. No surgery at this time. Recheck in one year.

## 2020-03-05 ENCOUNTER — PATIENT MESSAGE (OUTPATIENT)
Dept: RHEUMATOLOGY | Facility: CLINIC | Age: 60
End: 2020-03-05

## 2020-03-09 ENCOUNTER — PATIENT OUTREACH (OUTPATIENT)
Dept: ADMINISTRATIVE | Facility: OTHER | Age: 60
End: 2020-03-09

## 2020-03-11 ENCOUNTER — OFFICE VISIT (OUTPATIENT)
Dept: RHEUMATOLOGY | Facility: CLINIC | Age: 60
End: 2020-03-11
Payer: COMMERCIAL

## 2020-03-11 VITALS — HEIGHT: 70 IN | WEIGHT: 246.94 LBS | BODY MASS INDEX: 35.35 KG/M2

## 2020-03-11 DIAGNOSIS — L40.9 PSORIASIS: ICD-10-CM

## 2020-03-11 DIAGNOSIS — L40.50 PSA (PSORIATIC ARTHRITIS): Primary | ICD-10-CM

## 2020-03-11 PROCEDURE — 99214 OFFICE O/P EST MOD 30 MIN: CPT | Mod: S$GLB,,, | Performed by: INTERNAL MEDICINE

## 2020-03-11 PROCEDURE — 99999 PR PBB SHADOW E&M-EST. PATIENT-LVL III: CPT | Mod: PBBFAC,,, | Performed by: INTERNAL MEDICINE

## 2020-03-11 PROCEDURE — 99999 PR PBB SHADOW E&M-EST. PATIENT-LVL III: ICD-10-PCS | Mod: PBBFAC,,, | Performed by: INTERNAL MEDICINE

## 2020-03-11 PROCEDURE — 3008F BODY MASS INDEX DOCD: CPT | Mod: CPTII,S$GLB,, | Performed by: INTERNAL MEDICINE

## 2020-03-11 PROCEDURE — 99214 PR OFFICE/OUTPT VISIT, EST, LEVL IV, 30-39 MIN: ICD-10-PCS | Mod: S$GLB,,, | Performed by: INTERNAL MEDICINE

## 2020-03-11 PROCEDURE — 3008F PR BODY MASS INDEX (BMI) DOCUMENTED: ICD-10-PCS | Mod: CPTII,S$GLB,, | Performed by: INTERNAL MEDICINE

## 2020-03-11 ASSESSMENT — ROUTINE ASSESSMENT OF PATIENT INDEX DATA (RAPID3)
TOTAL RAPID3 SCORE: 1.56
PATIENT GLOBAL ASSESSMENT SCORE: 2
MDHAQ FUNCTION SCORE: .2
PAIN SCORE: 2
PSYCHOLOGICAL DISTRESS SCORE: 4.4

## 2020-03-11 NOTE — PROGRESS NOTES
Subjective:          Chief Complaint: Christin Caruso is a 60 y.o. female who had concerns including Psoriatic Arthritis.    HPI:  3/11/20: patient was concerned that Psoriasis on scalp was secondary to CPAP straps, trial with padding and all skin has cleared.   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 Some pain in top of foot wakes her only about 30% of time. No pain with walking.       8/19/2019:   2019 new diagnosis for psoriatic arthritis started methotrexate unfortunately she was having increasing hair loss thusly discontinued 7/2019.  She is here today to discuss alternate treatment options.       Hair improved stopping MTX. Off now x 4 months  She tried leflunomide for only a few days but became fearful side effects  Offered sulfasalazine  She was having additional pain in her feet particularly at night that could wake her still some stiffness in her hands but not as much as before and elected since August to not take leflunomide  concern of ASE.   Scalp with  psoriasis.  Mild singular plaque noted  She has some morning stiffness nothing that exceeds 20 min      MRI 03/13/2019 shows enhancement at the 1st metacarpal with synovial articulation joint effusion increased T2 signal and a sub subluxation versus ligamentous laxity she had some cortical erosions questioned but none overt found mostly degenerative changes noted at the 1st metacarpal and carpal articulations.  She did have fluid signal intensity seen in the flexor digitorum suggestive of a tenosynovitis and there is widening within the scapholunate ligament also suggestive of an inflammatory arthritis.    6/2019:  Patient is a 59-year-old female self-referred for joint aches and pains has been seen by me in 2016 all attributed to osteoarthritis but she did have a low titer positive NIESHA 1:160 homogeneous negative NIESHA profile.  That time last seen she was doing well with aleve for joint aches and pains.  As of  2019 she was seen Dr. Zuniga on the [place] noticing that the last 6 months of 2018 she was having a increased and progressive arthralgia now involving the entire right hand left hand was now painful still having stiffness in the morning less than 5 min for back hip and legs.    She has more recently been diagnosed with by temporal scalp psoriasis with Dr. Norma Waldrop given topical solution.  Dr. Zuniga did have an MRI done which did show some inflammatory arthritis.  There was a question of whether starting Plaquenil would work but given the risk for flaring of the psoriasis methotrexate was preferred she start MTX.     She has been on methotrexate 10 mg weekly with marked improvement in her joints    Previous serologies NIESHA + 1:160 homogenous, RF negative, ESR/CRP wnl, +TPO Ab.    REVIEW OF SYSTEMS:    Review of Systems   Constitutional: Negative for fever, malaise/fatigue and weight loss.   HENT: Negative for sore throat.    Eyes: Negative for double vision, photophobia and redness.   Respiratory: Negative for cough, shortness of breath and wheezing.    Cardiovascular: Negative for chest pain, palpitations and orthopnea.   Gastrointestinal: Negative for abdominal pain, constipation and diarrhea.   Genitourinary: Negative for dysuria, hematuria and urgency.   Musculoskeletal: Positive for joint pain. Negative for back pain and myalgias.   Skin: Negative for rash.   Neurological: Negative for dizziness, tingling, focal weakness and headaches.   Endo/Heme/Allergies: Does not bruise/bleed easily.   Psychiatric/Behavioral: Negative for depression, hallucinations and suicidal ideas.               Objective:            Past Medical History:   Diagnosis Date    Arthritis     Depression     Hypertension     Following with Dr. Craig    MONIKA (obstructive sleep apnea)     uses cpap    Pinguecula of left eye      Family History   Problem Relation Age of Onset    Heart disease Mother     Arthritis Mother      Cataracts Mother     Diabetes Father     Heart disease Father     Cataracts Father     Diabetes Sister     Strabismus Sister     Diabetes Brother     Heart disease Brother     Strabismus Brother     Diabetes Paternal Aunt     Heart disease Maternal Grandfather     Cancer Neg Hx     Amblyopia Neg Hx     Blindness Neg Hx     Glaucoma Neg Hx     Macular degeneration Neg Hx     Retinal detachment Neg Hx      Social History     Tobacco Use    Smoking status: Former Smoker     Packs/day: 0.15     Years: 4.00     Pack years: 0.60     Types: Cigarettes     Last attempt to quit: 10/1/2012     Years since quittin.4    Smokeless tobacco: Never Used   Substance Use Topics    Alcohol use: Yes     Alcohol/week: 1.0 standard drinks     Types: 1 Glasses of wine per week     Comment: socially    Drug use: No         Current Outpatient Medications on File Prior to Visit   Medication Sig Dispense Refill    AA/prot/lysine/methio/vit C/B6 (A/G PRO ORAL) Take 2 tablets by mouth once daily.       atorvastatin (LIPITOR) 40 MG tablet Take 1 tablet (40 mg total) by mouth once daily. 90 tablet 3    buPROPion (WELLBUTRIN XL) 150 MG TB24 tablet Take 1 tablet (150 mg total) by mouth once daily. 30 tablet 5    clopidogrel (PLAVIX) 75 mg tablet Take 75 mg by mouth once daily.      levothyroxine (SYNTHROID) 100 MCG tablet Take 1 tablet (100 mcg total) by mouth before breakfast. 30 tablet 5    metoprolol succinate (TOPROL-XL) 50 MG 24 hr tablet TAKE 1 TABLET BY MOUTH EVERY DAY 30 tablet 11    multivitamin (DAILY MULTI-VITAMIN) per tablet Take 1 tablet by mouth once daily.       ubidecarenone/vitamin E mixed (COQ10  ORAL) Take by mouth once daily.      vitamin D 1000 units Tab Take 2,000 Units by mouth once daily.       LORazepam (ATIVAN) 0.5 MG tablet Take 1 tablet (0.5 mg total) by mouth every 6 (six) hours as needed for Anxiety. 20 tablet 0     No current facility-administered medications on file prior to  visit.        There were no vitals filed for this visit.    Physical Exam:    Physical Exam   Constitutional: She appears well-developed and well-nourished.   HENT:   Nose: No septal deviation.   Mouth/Throat: Mucous membranes are normal. No oral lesions.   Eyes: Pupils are equal, round, and reactive to light. Right conjunctiva is not injected. Left conjunctiva is not injected.   Neck: No JVD present. No thyroid mass and no thyromegaly present.   Cardiovascular: Normal rate, regular rhythm and normal pulses.   No edema   Pulmonary/Chest: Effort normal and breath sounds normal.   Abdominal: Soft. Normal appearance. There is no hepatosplenomegaly.   Musculoskeletal:        Right shoulder: She exhibits normal range of motion, no tenderness and no swelling.        Left shoulder: She exhibits normal range of motion, no tenderness and no swelling.        Right elbow: She exhibits normal range of motion and no swelling. No tenderness found.        Left elbow: She exhibits normal range of motion and no swelling. No tenderness found.        Right wrist: She exhibits decreased range of motion and tenderness. She exhibits no swelling.        Left wrist: She exhibits decreased range of motion, tenderness and bony tenderness. She exhibits no swelling.        Right hip: She exhibits normal range of motion, normal strength and no swelling.        Left hip: She exhibits normal range of motion, no tenderness and no swelling.        Right knee: She exhibits normal range of motion and no swelling. No tenderness found.        Left knee: She exhibits normal range of motion and no swelling. No tenderness found.        Right ankle: She exhibits normal range of motion and no swelling. No tenderness.        Left ankle: She exhibits normal range of motion and no swelling. No tenderness.   Right 3rd IP with subluxation and bony hypertrophy with loss of flexion. + large heberden nodes b/l. Right hand with more significant deformity. 1+  synovitis unalble to finger curl.  impaired.   Lymphadenopathy:     She has no cervical adenopathy.     She has no axillary adenopathy.   Neurological: She has normal strength and normal reflexes.   Skin: Skin is dry and intact.   Psychiatric: She has a normal mood and affect.             Assessment:       Encounter Diagnoses   Name Primary?    PSA (psoriatic arthritis) Yes    Psoriasis           Plan:          Very pleasant 60 y/o female with a prior polyarthritis, more recently dx with Ps.   2019 w/ Diagnosis psoriasis (resolved for few months now) and joints feeling amrkedly better.     Failed MTX-hair growing back  Attempted start leflunomide patient did not use for concern of ASE.   Now stating that her joint pain is a 0/10  Discerned perhaps some contact dermatitis as culprit of the psoriasis.   Long discussion that I think we can monitor her over the next 6 months if noted decline in functional ability increased swelling of any joints with the exception of her known right 3rd PIP that has loss of motion then we can monitor without DMARD therapy.  Discussed the risk of untreated psoriatic arthritis and inflammatory arthritis could be a progression and ultimate loss of joint function. on her MRI and I think periodic and frequent monitoring of her function would be reasonable.  We will set a low threshold to re-challenge with leflunomide or even consider a biologic      Increase in mid foot pain s/p buniectomy 5 years ago still painful. 30% of the time.       Follow up in about 4 months (around 7/11/2020).    30min consultation with greater than 50% spent in counseling, chart review and coordination of care. All questions answered.

## 2020-03-17 RX ORDER — LEVOTHYROXINE SODIUM 137 UG/1
TABLET ORAL
Qty: 90 TABLET | Refills: 1 | Status: SHIPPED | OUTPATIENT
Start: 2020-03-17 | End: 2020-04-07

## 2020-04-06 ENCOUNTER — PATIENT MESSAGE (OUTPATIENT)
Dept: FAMILY MEDICINE | Facility: CLINIC | Age: 60
End: 2020-04-06

## 2020-04-07 ENCOUNTER — PATIENT MESSAGE (OUTPATIENT)
Dept: FAMILY MEDICINE | Facility: CLINIC | Age: 60
End: 2020-04-07

## 2020-04-07 ENCOUNTER — TELEPHONE (OUTPATIENT)
Dept: FAMILY MEDICINE | Facility: CLINIC | Age: 60
End: 2020-04-07

## 2020-04-07 ENCOUNTER — OFFICE VISIT (OUTPATIENT)
Dept: FAMILY MEDICINE | Facility: CLINIC | Age: 60
End: 2020-04-07
Payer: COMMERCIAL

## 2020-04-07 VITALS — WEIGHT: 246 LBS | RESPIRATION RATE: 16 BRPM | BODY MASS INDEX: 35.3 KG/M2

## 2020-04-07 DIAGNOSIS — E03.9 ACQUIRED HYPOTHYROIDISM: Primary | ICD-10-CM

## 2020-04-07 DIAGNOSIS — F41.9 ANXIETY: ICD-10-CM

## 2020-04-07 DIAGNOSIS — E55.9 VITAMIN D DEFICIENCY: ICD-10-CM

## 2020-04-07 DIAGNOSIS — F32.0 MILD MAJOR DEPRESSION: ICD-10-CM

## 2020-04-07 PROCEDURE — 3008F PR BODY MASS INDEX (BMI) DOCUMENTED: ICD-10-PCS | Mod: CPTII,,, | Performed by: INTERNAL MEDICINE

## 2020-04-07 PROCEDURE — 99214 PR OFFICE/OUTPT VISIT, EST, LEVL IV, 30-39 MIN: ICD-10-PCS | Mod: GT,,, | Performed by: INTERNAL MEDICINE

## 2020-04-07 PROCEDURE — 99214 OFFICE O/P EST MOD 30 MIN: CPT | Mod: GT,,, | Performed by: INTERNAL MEDICINE

## 2020-04-07 PROCEDURE — 3008F BODY MASS INDEX DOCD: CPT | Mod: CPTII,,, | Performed by: INTERNAL MEDICINE

## 2020-04-07 RX ORDER — BUPROPION HYDROCHLORIDE 300 MG/1
300 TABLET ORAL DAILY
Qty: 30 TABLET | Refills: 5 | Status: SHIPPED | OUTPATIENT
Start: 2020-04-07 | End: 2020-09-28

## 2020-04-07 NOTE — TELEPHONE ENCOUNTER
"----- Message from Matias Llanos sent at 4/7/2020 11:44 AM CDT -----  Contact: Ptnt  [phone]  Type: Needs Medical Advice    Who Called:  Ptnt  [phone]    Additional Information:   Advised can't get logged in to the portal for her VV appmnt at 11:40. Message "plug in not supported" .  Please call for telephone appmnt.  "

## 2020-04-07 NOTE — PROGRESS NOTES
Assessment and Plan:    1. Mild major depression  Discussed options and have elected to increase dose of wellbutrin. We discussed how to take this medication and the likely time to effect of this medication. We discussed potential side effects and to let me know if she is having any of these side effects. Follow up in 2 months.  - buPROPion (WELLBUTRIN XL) 300 MG 24 hr tablet; Take 1 tablet (300 mg total) by mouth once daily.  Dispense: 30 tablet; Refill: 5    2. Anxiety  - buPROPion (WELLBUTRIN XL) 300 MG 24 hr tablet; Take 1 tablet (300 mg total) by mouth once daily.  Dispense: 30 tablet; Refill: 5    3. Acquired hypothyroidism  Will recheck TSH with labs next month, not doing them immediately due to COVID-19  - TSH; Future    4. Vitamin D deficiency  Taking 1000 units daily, due for follow up labs.   - Vitamin D; Future    ______________________________________________________________________  Subjective:    Chief Complaint:  Follow up chronic medical conditions.     HPI:  Christin is a 60 y.o. year old female with telemedicine visit today as consultation for follow up     The patient location is: Home  The chief complaint leading to consultation is: as above  Visit type: Virtual visit with synchronous audio and video  Total time spent with patient: 20 minutes with additional time for charting and additional time helping patient learn to sign in for apt  Each patient to whom he or she provides medical services by telemedicine is:  (1) informed of the relationship between the physician and patient and the respective role of any other health care provider with respect to management of the patient; and (2) notified that he or she may decline to receive medical services by telemedicine and may withdraw from such care at any time.    Depression- Since starting wellbutrin in January, she reports that she had initially noticed some improvement in mood, but still having some days of crying all day long. Does not feel like  it is really getting a lot of benefit, but there has been some benefit. Seems to have good days and bad days. No real side effects    Hypothyroidism- Has been taking levothyroxine 100 mcg daily and has been taking this as prescribed on empty stomach.     Vitamin D- Last reading was was 26 in Jan 2019. Has been taking 1000 units daily.     Medications:  Current Outpatient Medications on File Prior to Visit   Medication Sig Dispense Refill    AA/prot/lysine/methio/vit C/B6 (A/G PRO ORAL) Take 2 tablets by mouth once daily.       atorvastatin (LIPITOR) 40 MG tablet TAKE 1 TABLET BY MOUTH EVERY DAY 90 tablet 3    clopidogrel (PLAVIX) 75 mg tablet Take 75 mg by mouth once daily.      levothyroxine (SYNTHROID) 100 MCG tablet Take 1 tablet (100 mcg total) by mouth before breakfast. 30 tablet 5    LORazepam (ATIVAN) 0.5 MG tablet Take 1 tablet (0.5 mg total) by mouth every 6 (six) hours as needed for Anxiety. 20 tablet 0    metoprolol succinate (TOPROL-XL) 50 MG 24 hr tablet TAKE 1 TABLET BY MOUTH EVERY DAY 30 tablet 11    multivitamin (DAILY MULTI-VITAMIN) per tablet Take 1 tablet by mouth once daily.       ubidecarenone/vitamin E mixed (COQ10  ORAL) Take by mouth once daily.      vitamin D 1000 units Tab Take 2,000 Units by mouth once daily.       [DISCONTINUED] buPROPion (WELLBUTRIN XL) 150 MG TB24 tablet Take 1 tablet (150 mg total) by mouth once daily. 30 tablet 5    [DISCONTINUED] SYNTHROID 137 mcg Tab tablet TAKE 1 TABLET BY MOUTH BEFORE BREAKFAST 90 tablet 1     No current facility-administered medications on file prior to visit.        Review of Systems:  Review of Systems   Constitutional: Negative for chills and fever.   Respiratory: Negative for cough, shortness of breath and wheezing.    Endocrine: Negative for cold intolerance and heat intolerance.   Neurological: Negative for dizziness and light-headedness.   Psychiatric/Behavioral: Positive for dysphoric mood and sleep disturbance. Negative  for hallucinations and suicidal ideas. The patient is nervous/anxious.        Past Medical History:  Past Medical History:   Diagnosis Date    Arthritis     Depression     Hypertension     Following with Dr. Craig    MONIKA (obstructive sleep apnea)     uses cpap    Pinguecula of left eye        Objective:    Vitals:  Vitals:    04/07/20 1340   Resp: 16   Weight: 111.6 kg (246 lb)       Physical Exam   Constitutional: She is oriented to person, place, and time. She appears well-developed and well-nourished. No distress.   HENT:   Head: Normocephalic and atraumatic.   Pulmonary/Chest: Effort normal. No respiratory distress.   Neurological: She is alert and oriented to person, place, and time.   Psychiatric: She has a normal mood and affect. Her behavior is normal. Judgment and thought content normal.       Data:  Previous labs reviewed and pertinent for TSH normal last August.      Judi Gonsalez MD  Internal Medicine

## 2020-05-06 ENCOUNTER — PATIENT MESSAGE (OUTPATIENT)
Dept: ADMINISTRATIVE | Facility: HOSPITAL | Age: 60
End: 2020-05-06

## 2020-05-11 ENCOUNTER — LAB VISIT (OUTPATIENT)
Dept: LAB | Facility: HOSPITAL | Age: 60
End: 2020-05-11
Attending: INTERNAL MEDICINE
Payer: COMMERCIAL

## 2020-05-11 DIAGNOSIS — F41.9 ANXIETY: ICD-10-CM

## 2020-05-11 DIAGNOSIS — E78.5 DYSLIPIDEMIA: ICD-10-CM

## 2020-05-11 DIAGNOSIS — I25.84 CORONARY ARTERY DISEASE DUE TO CALCIFIED CORONARY LESION: ICD-10-CM

## 2020-05-11 DIAGNOSIS — E78.00 HYPERCHOLESTEREMIA: ICD-10-CM

## 2020-05-11 DIAGNOSIS — L40.50 PSORIATIC ARTHRITIS: ICD-10-CM

## 2020-05-11 DIAGNOSIS — I10 ESSENTIAL HYPERTENSION: ICD-10-CM

## 2020-05-11 DIAGNOSIS — I25.10 CORONARY ARTERY DISEASE DUE TO CALCIFIED CORONARY LESION: ICD-10-CM

## 2020-05-11 DIAGNOSIS — G47.33 OBSTRUCTIVE SLEEP APNEA: ICD-10-CM

## 2020-05-11 DIAGNOSIS — E55.9 VITAMIN D DEFICIENCY: ICD-10-CM

## 2020-05-11 DIAGNOSIS — I25.10 CORONARY ARTERY DISEASE INVOLVING NATIVE CORONARY ARTERY OF NATIVE HEART WITHOUT ANGINA PECTORIS: ICD-10-CM

## 2020-05-11 DIAGNOSIS — E03.9 ACQUIRED HYPOTHYROIDISM: ICD-10-CM

## 2020-05-11 LAB
25(OH)D3+25(OH)D2 SERPL-MCNC: 40 NG/ML (ref 30–96)
ALBUMIN SERPL BCP-MCNC: 3.8 G/DL (ref 3.5–5.2)
ALP SERPL-CCNC: 80 U/L (ref 55–135)
ALT SERPL W/O P-5'-P-CCNC: 19 U/L (ref 10–44)
ANION GAP SERPL CALC-SCNC: 8 MMOL/L (ref 8–16)
AST SERPL-CCNC: 20 U/L (ref 10–40)
BILIRUB SERPL-MCNC: 0.7 MG/DL (ref 0.1–1)
BUN SERPL-MCNC: 18 MG/DL (ref 6–20)
CALCIUM SERPL-MCNC: 8.8 MG/DL (ref 8.7–10.5)
CHLORIDE SERPL-SCNC: 104 MMOL/L (ref 95–110)
CHOLEST SERPL-MCNC: 141 MG/DL (ref 120–199)
CHOLEST/HDLC SERPL: 4.7 {RATIO} (ref 2–5)
CK SERPL-CCNC: 261 U/L (ref 20–180)
CO2 SERPL-SCNC: 30 MMOL/L (ref 23–29)
CREAT SERPL-MCNC: 0.8 MG/DL (ref 0.5–1.4)
CRP SERPL-MCNC: 2.1 MG/L (ref 0–8.2)
EST. GFR  (AFRICAN AMERICAN): >60 ML/MIN/1.73 M^2
EST. GFR  (NON AFRICAN AMERICAN): >60 ML/MIN/1.73 M^2
GLUCOSE SERPL-MCNC: 98 MG/DL (ref 70–110)
HDLC SERPL-MCNC: 30 MG/DL (ref 40–75)
HDLC SERPL: 21.3 % (ref 20–50)
LDLC SERPL CALC-MCNC: 52.8 MG/DL (ref 63–159)
NONHDLC SERPL-MCNC: 111 MG/DL
POTASSIUM SERPL-SCNC: 4.3 MMOL/L (ref 3.5–5.1)
PROT SERPL-MCNC: 6.4 G/DL (ref 6–8.4)
SODIUM SERPL-SCNC: 142 MMOL/L (ref 136–145)
T4 FREE SERPL-MCNC: 1.05 NG/DL (ref 0.71–1.51)
TRIGL SERPL-MCNC: 291 MG/DL (ref 30–150)
TSH SERPL DL<=0.005 MIU/L-ACNC: 5.3 UIU/ML (ref 0.4–4)

## 2020-05-11 PROCEDURE — 36415 COLL VENOUS BLD VENIPUNCTURE: CPT | Mod: PN

## 2020-05-11 PROCEDURE — 82306 VITAMIN D 25 HYDROXY: CPT

## 2020-05-11 PROCEDURE — 85652 RBC SED RATE AUTOMATED: CPT

## 2020-05-11 PROCEDURE — 80053 COMPREHEN METABOLIC PANEL: CPT

## 2020-05-11 PROCEDURE — 82550 ASSAY OF CK (CPK): CPT

## 2020-05-11 PROCEDURE — 84443 ASSAY THYROID STIM HORMONE: CPT

## 2020-05-11 PROCEDURE — 80061 LIPID PANEL: CPT

## 2020-05-11 PROCEDURE — 85025 COMPLETE CBC W/AUTO DIFF WBC: CPT

## 2020-05-11 PROCEDURE — 86140 C-REACTIVE PROTEIN: CPT

## 2020-05-11 PROCEDURE — 84439 ASSAY OF FREE THYROXINE: CPT

## 2020-05-12 LAB
BASOPHILS # BLD AUTO: 0.03 K/UL (ref 0–0.2)
BASOPHILS NFR BLD: 0.5 % (ref 0–1.9)
DIFFERENTIAL METHOD: ABNORMAL
EOSINOPHIL # BLD AUTO: 0.1 K/UL (ref 0–0.5)
EOSINOPHIL NFR BLD: 1.9 % (ref 0–8)
ERYTHROCYTE [DISTWIDTH] IN BLOOD BY AUTOMATED COUNT: 13.1 % (ref 11.5–14.5)
ERYTHROCYTE [SEDIMENTATION RATE] IN BLOOD BY WESTERGREN METHOD: 5 MM/HR (ref 0–36)
HCT VFR BLD AUTO: 44.7 % (ref 37–48.5)
HGB BLD-MCNC: 13.3 G/DL (ref 12–16)
IMM GRANULOCYTES # BLD AUTO: 0.01 K/UL (ref 0–0.04)
IMM GRANULOCYTES NFR BLD AUTO: 0.2 % (ref 0–0.5)
LYMPHOCYTES # BLD AUTO: 2.2 K/UL (ref 1–4.8)
LYMPHOCYTES NFR BLD: 34.3 % (ref 18–48)
MCH RBC QN AUTO: 28.7 PG (ref 27–31)
MCHC RBC AUTO-ENTMCNC: 29.8 G/DL (ref 32–36)
MCV RBC AUTO: 97 FL (ref 82–98)
MONOCYTES # BLD AUTO: 0.5 K/UL (ref 0.3–1)
MONOCYTES NFR BLD: 7.6 % (ref 4–15)
NEUTROPHILS # BLD AUTO: 3.6 K/UL (ref 1.8–7.7)
NEUTROPHILS NFR BLD: 55.5 % (ref 38–73)
NRBC BLD-RTO: 0 /100 WBC
PLATELET # BLD AUTO: 226 K/UL (ref 150–350)
PMV BLD AUTO: 11.3 FL (ref 9.2–12.9)
RBC # BLD AUTO: 4.63 M/UL (ref 4–5.4)
WBC # BLD AUTO: 6.42 K/UL (ref 3.9–12.7)

## 2020-06-17 ENCOUNTER — TELEPHONE (OUTPATIENT)
Dept: ENDOCRINOLOGY | Facility: CLINIC | Age: 60
End: 2020-06-17

## 2020-06-17 DIAGNOSIS — E03.9 HYPOTHYROIDISM, UNSPECIFIED TYPE: Primary | ICD-10-CM

## 2020-06-17 RX ORDER — LEVOTHYROXINE SODIUM 112 UG/1
112 TABLET ORAL
Qty: 30 TABLET | Refills: 11 | Status: SHIPPED | OUTPATIENT
Start: 2020-06-17 | End: 2020-07-22

## 2020-06-17 NOTE — TELEPHONE ENCOUNTER
Pt was on Levothyroxine 100 mcg, then labs came back TSH 5.305 on 5/11  Since then has self adjusted meds to levothyroxine 137 mcg for the past two weeks  Asking for advise  Will do new labs, come in, etc  Please advise

## 2020-06-17 NOTE — TELEPHONE ENCOUNTER
"----- Message from Jelani David sent at 6/17/2020 12:04 PM CDT -----  Type: Needs Medical Advice    Who Called:  Patient  Best Call Back Number: [phone]  Additional Information: Patient would like to discuss scheduling an appointment for "thyroid numbers are off". Please call to advise. Thanks!    "

## 2020-06-17 NOTE — TELEPHONE ENCOUNTER
Pt. Notified of Dr. Head's previous message and verbalized understanding.Scheduled for TSH in 6 weeks

## 2020-07-15 ENCOUNTER — HOSPITAL ENCOUNTER (OUTPATIENT)
Dept: RADIOLOGY | Facility: HOSPITAL | Age: 60
Discharge: HOME | End: 2020-07-15
Attending: INTERNAL MEDICINE
Payer: COMMERCIAL

## 2020-07-15 DIAGNOSIS — Z12.31 BREAST CANCER SCREENING BY MAMMOGRAM: ICD-10-CM

## 2020-07-15 PROCEDURE — 77063 MAMMO DIGITAL SCREENING BILAT WITH TOMOSYNTHESIS_CAD: ICD-10-PCS | Mod: 26,,, | Performed by: RADIOLOGY

## 2020-07-15 PROCEDURE — 77067 SCR MAMMO BI INCL CAD: CPT | Mod: TC,PO

## 2020-07-15 PROCEDURE — 77067 SCR MAMMO BI INCL CAD: CPT | Mod: 26,,, | Performed by: RADIOLOGY

## 2020-07-15 PROCEDURE — 77063 BREAST TOMOSYNTHESIS BI: CPT | Mod: 26,,, | Performed by: RADIOLOGY

## 2020-07-15 PROCEDURE — 77067 MAMMO DIGITAL SCREENING BILAT WITH TOMOSYNTHESIS_CAD: ICD-10-PCS | Mod: 26,,, | Performed by: RADIOLOGY

## 2020-07-29 ENCOUNTER — LAB VISIT (OUTPATIENT)
Dept: LAB | Facility: HOSPITAL | Age: 60
End: 2020-07-29
Attending: INTERNAL MEDICINE
Payer: COMMERCIAL

## 2020-07-29 DIAGNOSIS — E03.9 HYPOTHYROIDISM, UNSPECIFIED TYPE: ICD-10-CM

## 2020-07-29 LAB — TSH SERPL DL<=0.005 MIU/L-ACNC: 3.29 UIU/ML (ref 0.4–4)

## 2020-07-29 PROCEDURE — 36415 COLL VENOUS BLD VENIPUNCTURE: CPT | Mod: PN

## 2020-07-29 PROCEDURE — 84443 ASSAY THYROID STIM HORMONE: CPT

## 2020-09-11 ENCOUNTER — PATIENT OUTREACH (OUTPATIENT)
Dept: ADMINISTRATIVE | Facility: OTHER | Age: 60
End: 2020-09-11

## 2020-09-11 NOTE — PROGRESS NOTES
Health Maintenance Due   Topic Date Due    Shingles Vaccine (1 of 2) 01/21/2011    Influenza Vaccine (1) 08/01/2020     Updates were requested from care everywhere.  Chart was reviewed for overdue Proactive [facility] Encounters (NAE) topics (CRS, Breast Cancer Screening, Eye exam)  Health Maintenance has been updated.  [place] immunization registry triggered.  Immunizations were reconciled.

## 2020-09-14 ENCOUNTER — OFFICE VISIT (OUTPATIENT)
Dept: RHEUMATOLOGY | Facility: CLINIC | Age: 60
End: 2020-09-14
Payer: COMMERCIAL

## 2020-09-14 VITALS
DIASTOLIC BLOOD PRESSURE: 78 MMHG | HEART RATE: 69 BPM | HEIGHT: 71 IN | SYSTOLIC BLOOD PRESSURE: 130 MMHG | BODY MASS INDEX: 34.55 KG/M2 | WEIGHT: 246.81 LBS

## 2020-09-14 DIAGNOSIS — L40.9 PSORIASIS: ICD-10-CM

## 2020-09-14 DIAGNOSIS — S93.306A: ICD-10-CM

## 2020-09-14 DIAGNOSIS — L40.50 PSA (PSORIATIC ARTHRITIS): Primary | ICD-10-CM

## 2020-09-14 PROCEDURE — 99214 OFFICE O/P EST MOD 30 MIN: CPT | Mod: S$GLB,,, | Performed by: INTERNAL MEDICINE

## 2020-09-14 PROCEDURE — 3008F PR BODY MASS INDEX (BMI) DOCUMENTED: ICD-10-PCS | Mod: CPTII,S$GLB,, | Performed by: INTERNAL MEDICINE

## 2020-09-14 PROCEDURE — 99999 PR PBB SHADOW E&M-EST. PATIENT-LVL III: ICD-10-PCS | Mod: PBBFAC,,, | Performed by: INTERNAL MEDICINE

## 2020-09-14 PROCEDURE — 3078F PR MOST RECENT DIASTOLIC BLOOD PRESSURE < 80 MM HG: ICD-10-PCS | Mod: CPTII,S$GLB,, | Performed by: INTERNAL MEDICINE

## 2020-09-14 PROCEDURE — 99214 PR OFFICE/OUTPT VISIT, EST, LEVL IV, 30-39 MIN: ICD-10-PCS | Mod: S$GLB,,, | Performed by: INTERNAL MEDICINE

## 2020-09-14 PROCEDURE — 3075F PR MOST RECENT SYSTOLIC BLOOD PRESS GE 130-139MM HG: ICD-10-PCS | Mod: CPTII,S$GLB,, | Performed by: INTERNAL MEDICINE

## 2020-09-14 PROCEDURE — 99999 PR PBB SHADOW E&M-EST. PATIENT-LVL III: CPT | Mod: PBBFAC,,, | Performed by: INTERNAL MEDICINE

## 2020-09-14 PROCEDURE — 3008F BODY MASS INDEX DOCD: CPT | Mod: CPTII,S$GLB,, | Performed by: INTERNAL MEDICINE

## 2020-09-14 PROCEDURE — 3078F DIAST BP <80 MM HG: CPT | Mod: CPTII,S$GLB,, | Performed by: INTERNAL MEDICINE

## 2020-09-14 PROCEDURE — 3075F SYST BP GE 130 - 139MM HG: CPT | Mod: CPTII,S$GLB,, | Performed by: INTERNAL MEDICINE

## 2020-09-14 NOTE — PROGRESS NOTES
Subjective:          Chief Complaint: Christin Caruso is a 60 y.o. female who had concerns including Psoriatic Arthritis.    HPI:    9/14/20:   Patient here for eval of PsA:  2019 new diagnosis for psoriatic arthritis started methotrexate unfortunately she was having increasing hair loss thusly discontinued 7/2019  Hair improved stopping MTX. Off now x 6 months  She tried leflunomide for only a few days but became fearful side effects  Offered sulfasalazine- ? Of sulfa allergy but she will check with dermatology about this (sounds like metrogel, not a sulfa. )  Hands still painful and stiff, noting new enlarging right 5th DIP/PIP hypertrophy.   She was having additional pain in her feet particularly at night that could wake her still some stiffness in her hands but not as much as before and elected since August to not take leflunomide  concern of ASE.   Scalp with  psoriasis.  Mild singular plaque noted  She has some morning stiffness nothing that exceeds 20 min  +CAD with stents.     3/11/20: patient was concerned that Psoriasis on scalp was secondary to CPAP straps, trial with padding and all skin has cleared.   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 Some pain in top of foot wakes her only about 30% of time. No pain with walking.       MRI 03/13/2019 shows enhancement at the 1st metacarpal with synovial articulation joint effusion increased T2 signal and a sub subluxation versus ligamentous laxity she had some cortical erosions questioned but none overt found mostly degenerative changes noted at the 1st metacarpal and carpal articulations.  She did have fluid signal intensity seen in the flexor digitorum suggestive of a tenosynovitis and there is widening within the scapholunate ligament also suggestive of an inflammatory arthritis.    Prior Hx:   Patient is a 59-year-old female self-referred for joint aches and pains has been seen by me in 2016 all attributed to  osteoarthritis but she did have a low titer positive NIESHA 1:160 homogeneous negative NIESHA profile.  That time last seen she was doing well with aleve for joint aches and pains.  As of 2019 she was seen Dr. Zuniga on the [facility] noticing that the last 6 months of 2018 she was having a increased and progressive arthralgia now involving the entire right hand left hand was now painful still having stiffness in the morning less than 5 min for back hip and legs.    She has more recently been diagnosed with by temporal scalp psoriasis with Dr. Norma Waldrop given topical solution.  Dr. Zuniga did have an MRI done which did show some inflammatory arthritis.  There was a question of whether starting Plaquenil would work but given the risk for flaring of the psoriasis methotrexate was preferred she start MTX.     She has been on methotrexate 10 mg weekly with marked improvement in her joints    Previous serologies NIESHA + 1:160 homogenous, RF negative, ESR/CRP wnl, +TPO Ab.    REVIEW OF SYSTEMS:    Review of Systems   Constitutional: Negative for fever, malaise/fatigue and weight loss.   HENT: Negative for sore throat.    Eyes: Negative for double vision, photophobia and redness.   Respiratory: Negative for cough, shortness of breath and wheezing.    Cardiovascular: Negative for chest pain, palpitations and orthopnea.   Gastrointestinal: Negative for abdominal pain, constipation and diarrhea.   Genitourinary: Negative for dysuria, hematuria and urgency.   Musculoskeletal: Positive for joint pain. Negative for back pain and myalgias.   Skin: Negative for rash.   Neurological: Negative for dizziness, tingling, focal weakness and headaches.   Endo/Heme/Allergies: Does not bruise/bleed easily.   Psychiatric/Behavioral: Negative for depression, hallucinations and suicidal ideas.               Objective:            Past Medical History:   Diagnosis Date    Arthritis     Depression     Hypertension     Following with Dr. Craig     Pinguecula of left eye      Family History   Problem Relation Age of Onset    Heart disease Mother     Arthritis Mother     Cataracts Mother     Diabetes Father     Heart disease Father     Cataracts Father     Diabetes Sister     Strabismus Sister     Diabetes Brother     Heart disease Brother     Strabismus Brother     Diabetes Paternal Aunt     Heart disease Maternal Grandfather     Cancer Neg Hx     Amblyopia Neg Hx     Blindness Neg Hx     Glaucoma Neg Hx     Macular degeneration Neg Hx     Retinal detachment Neg Hx      Social History     Tobacco Use    Smoking status: Former Smoker     Packs/day: 0.15     Years: 4.00     Pack years: 0.60     Types: Cigarettes     Quit date: 10/1/2012     Years since quittin.9    Smokeless tobacco: Never Used   Substance Use Topics    Alcohol use: Yes     Alcohol/week: 1.0 standard drinks     Types: 1 Glasses of wine per week     Comment: socially    Drug use: No         Current Outpatient Medications on File Prior to Visit   Medication Sig Dispense Refill    AA/prot/lysine/methio/vit C/B6 (A/G PRO ORAL) Take 2 tablets by mouth once daily.       atorvastatin (LIPITOR) 40 MG tablet Take 1 tablet (40 mg total) by mouth once daily. 90 tablet 3    buPROPion (WELLBUTRIN XL) 300 MG 24 hr tablet Take 1 tablet (300 mg total) by mouth once daily. 30 tablet 5    clopidogreL (PLAVIX) 75 mg tablet Take 1 tablet (75 mg total) by mouth once daily. 90 tablet 3    metoprolol succinate (TOPROL-XL) 50 MG 24 hr tablet Take 1 tablet (50 mg total) by mouth once daily. 90 tablet 3    multivitamin (DAILY MULTI-VITAMIN) per tablet Take 1 tablet by mouth once daily.       omega-3 fatty acids/fish oil (FISH OIL-OMEGA-3 FATTY ACIDS) 300-1,000 mg capsule Take 4 capsules by mouth once daily.      SYNTHROID 100 mcg tablet TAKE 1 TABLET (100 MCG TOTAL) BY MOUTH BEFORE BREAKFAST. 90 tablet 1    ubidecarenone/vitamin E mixed (COQ10  ORAL) Take by mouth once daily.       vitamin D 1000 units Tab Take 2,000 Units by mouth once daily.       LORazepam (ATIVAN) 0.5 MG tablet Take 1 tablet (0.5 mg total) by mouth every 6 (six) hours as needed for Anxiety. 20 tablet 0     No current facility-administered medications on file prior to visit.        Vitals:    09/14/20 1004   BP: 130/78   Pulse: 69       Physical Exam:    Physical Exam  Constitutional:       Appearance: Normal appearance. She is well-developed.   HENT:      Nose: No septal deviation.      Mouth/Throat:      Mouth: No oral lesions.   Eyes:      Conjunctiva/sclera:      Right eye: Right conjunctiva is not injected.      Left eye: Left conjunctiva is not injected.      Pupils: Pupils are equal, round, and reactive to light.   Neck:      Thyroid: No thyroid mass or thyromegaly.      Vascular: No JVD.   Cardiovascular:      Rate and Rhythm: Normal rate and regular rhythm.      Pulses: Normal pulses.      Comments: No edema  Pulmonary:      Effort: Pulmonary effort is normal.      Breath sounds: Normal breath sounds.   Abdominal:      Palpations: Abdomen is soft.   Musculoskeletal:      Right shoulder: She exhibits normal range of motion, no tenderness and no swelling.      Left shoulder: She exhibits normal range of motion, no tenderness and no swelling.      Right elbow: She exhibits normal range of motion and no swelling. No tenderness found.      Left elbow: She exhibits normal range of motion and no swelling. No tenderness found.      Right wrist: She exhibits decreased range of motion and tenderness. She exhibits no swelling.      Left wrist: She exhibits decreased range of motion, tenderness and bony tenderness. She exhibits no swelling.      Right hip: She exhibits normal range of motion, normal strength and no swelling.      Left hip: She exhibits normal range of motion, no tenderness and no swelling.      Right knee: She exhibits normal range of motion and no swelling. No tenderness found.      Left knee: She  exhibits normal range of motion and no swelling. No tenderness found.      Right ankle: She exhibits normal range of motion and no swelling. No tenderness.      Left ankle: She exhibits normal range of motion and no swelling. No tenderness.      Comments: Right 3rd IP with subluxation and bony hypertrophy with loss of flexion. + large heberden nodes b/l. Right hand with more significant deformity. 1+ synovitis unalble to finger curl.  impaired.   Lymphadenopathy:      Cervical: No cervical adenopathy.   Skin:     General: Skin is dry.   Neurological:      Mental Status: She is alert and oriented to person, place, and time. Mental status is at baseline.               Assessment:       Encounter Diagnoses   Name Primary?    PSA (psoriatic arthritis) Yes    Psoriasis     Subluxation of metatarsal - Right Foot           Plan:          Very pleasant 58 y/o female with a prior polyarthritis, more recently dx with Ps.   2019 w/ Diagnosis psoriasis (resolved for few months now) and joints feeling amrkedly better.     Failed MTX-hair growing back  Attempted start leflunomide patient did not use for concern of ASE.   Now stating that her joint pain is a 0/10  Discerned perhaps some contact dermatitis as culprit of the psoriasis.   Long discussion that I think we can monitor her over the next 6 months again,  if noted decline in functional ability increased swelling of any joints with the exception of her known right 3rd PIP that has loss of motion then we can monitor without DMARD therapy.  Discussed the risk of untreated psoriatic arthritis and inflammatory arthritis could be a progression and ultimate loss of joint function. on her MRI and I think periodic and frequent monitoring of her function would be reasonable.  We will set a low threshold to re-challenge with leflunomide or even consider a biologic. Reviewed SSZ as an option as well.           No follow-ups on file.    30min consultation with greater than 50%  spent in counseling, chart review and coordination of care. All questions answered.

## 2020-09-25 ENCOUNTER — PATIENT MESSAGE (OUTPATIENT)
Dept: OTHER | Facility: OTHER | Age: 60
End: 2020-09-25

## 2020-09-26 DIAGNOSIS — F32.0 MILD MAJOR DEPRESSION: ICD-10-CM

## 2020-09-26 DIAGNOSIS — F41.9 ANXIETY: ICD-10-CM

## 2020-09-28 RX ORDER — BUPROPION HYDROCHLORIDE 300 MG/1
TABLET ORAL
Qty: 30 TABLET | Refills: 0 | Status: SHIPPED | OUTPATIENT
Start: 2020-09-28 | End: 2020-10-07

## 2020-09-29 NOTE — TELEPHONE ENCOUNTER
Called pt in regards to rx refill notification and scheduling a follow up appt. No answer. LVM to return phone call to clinic.

## 2020-10-08 ENCOUNTER — OFFICE VISIT (OUTPATIENT)
Dept: FAMILY MEDICINE | Facility: CLINIC | Age: 60
End: 2020-10-08
Payer: COMMERCIAL

## 2020-10-08 VITALS
RESPIRATION RATE: 18 BRPM | WEIGHT: 247.81 LBS | HEIGHT: 71 IN | BODY MASS INDEX: 34.69 KG/M2 | DIASTOLIC BLOOD PRESSURE: 80 MMHG | HEART RATE: 61 BPM | OXYGEN SATURATION: 97 % | SYSTOLIC BLOOD PRESSURE: 116 MMHG | TEMPERATURE: 99 F

## 2020-10-08 DIAGNOSIS — I10 ESSENTIAL HYPERTENSION: Primary | ICD-10-CM

## 2020-10-08 DIAGNOSIS — E78.2 MIXED HYPERLIPIDEMIA: ICD-10-CM

## 2020-10-08 DIAGNOSIS — F32.0 MILD MAJOR DEPRESSION: ICD-10-CM

## 2020-10-08 DIAGNOSIS — F41.9 ANXIETY: ICD-10-CM

## 2020-10-08 PROCEDURE — 3074F SYST BP LT 130 MM HG: CPT | Mod: CPTII,S$GLB,, | Performed by: INTERNAL MEDICINE

## 2020-10-08 PROCEDURE — 99214 PR OFFICE/OUTPT VISIT, EST, LEVL IV, 30-39 MIN: ICD-10-PCS | Mod: S$GLB,,, | Performed by: INTERNAL MEDICINE

## 2020-10-08 PROCEDURE — 3008F PR BODY MASS INDEX (BMI) DOCUMENTED: ICD-10-PCS | Mod: CPTII,S$GLB,, | Performed by: INTERNAL MEDICINE

## 2020-10-08 PROCEDURE — 3074F PR MOST RECENT SYSTOLIC BLOOD PRESSURE < 130 MM HG: ICD-10-PCS | Mod: CPTII,S$GLB,, | Performed by: INTERNAL MEDICINE

## 2020-10-08 PROCEDURE — 3079F DIAST BP 80-89 MM HG: CPT | Mod: CPTII,S$GLB,, | Performed by: INTERNAL MEDICINE

## 2020-10-08 PROCEDURE — 3079F PR MOST RECENT DIASTOLIC BLOOD PRESSURE 80-89 MM HG: ICD-10-PCS | Mod: CPTII,S$GLB,, | Performed by: INTERNAL MEDICINE

## 2020-10-08 PROCEDURE — 99999 PR PBB SHADOW E&M-EST. PATIENT-LVL IV: CPT | Mod: PBBFAC,,, | Performed by: INTERNAL MEDICINE

## 2020-10-08 PROCEDURE — 3008F BODY MASS INDEX DOCD: CPT | Mod: CPTII,S$GLB,, | Performed by: INTERNAL MEDICINE

## 2020-10-08 PROCEDURE — 99999 PR PBB SHADOW E&M-EST. PATIENT-LVL IV: ICD-10-PCS | Mod: PBBFAC,,, | Performed by: INTERNAL MEDICINE

## 2020-10-08 PROCEDURE — 99214 OFFICE O/P EST MOD 30 MIN: CPT | Mod: S$GLB,,, | Performed by: INTERNAL MEDICINE

## 2020-10-08 RX ORDER — VENLAFAXINE HYDROCHLORIDE 75 MG/1
75 CAPSULE, EXTENDED RELEASE ORAL DAILY
COMMUNITY
End: 2022-03-03

## 2020-10-08 RX ORDER — LEVOTHYROXINE SODIUM 112 UG/1
112 TABLET ORAL
COMMUNITY
Start: 2020-09-16 | End: 2021-06-30

## 2020-10-08 NOTE — PROGRESS NOTES
Assessment and Plan:    1. Essential hypertension  Controlled, continue current medications.    2. Mixed hyperlipidemia  Cholesterol controlled, but TG elevated. Discussed diet and exercise changes to help improve TG.    3. Mild major depression  4. Anxiety  Now seeing psychiatry, changed to effexor from wellbutrin. We disucssed the potential interaction with wellbutrin and metoprolol, but as she had not been having bradycardia while taking both of them for a long time it is not likely that it would have mattered. That being said, she is going to see how she does on effexor. Discussed that she can always let me know if she has questions about medications.     ______________________________________________________________________  Subjective:    Chief Complaint:  Follow up chronic medical conditions.    HPI:  Christin is a 60 y.o. year old female here to follow up chronic medical conditions.       Depression- She is now seeing a counselor. She had done a video call with Dr. Christina yesterday and was prescribed venlafaxine. She had stopped taking wellbutrin as her pharmacist had told her there was an interaction with wellbutrin.     Hypothyroidism- Has been taking Synthroid 112 mcg daily and has been taking this as prescribed on empty stomach.      Vitamin D- Up to 40 last lab. Has been taking 2000 units daily.     HLD- Taking atorvastatin, TG still elevated. She has not been able to exercise much lately.     Psoriatic arthritis- Seen most recently my Dr. Brown last month. Not on DMARD, monitoring currently.     Medications:  Current Outpatient Medications on File Prior to Visit   Medication Sig Dispense Refill    AA/prot/lysine/methio/vit C/B6 (A/G PRO ORAL) Take 2 tablets by mouth once daily.       atorvastatin (LIPITOR) 40 MG tablet Take 1 tablet (40 mg total) by mouth once daily. 90 tablet 3    clopidogreL (PLAVIX) 75 mg tablet Take 1 tablet (75 mg total) by mouth once daily. 90 tablet 3    LORazepam (ATIVAN) 0.5  MG tablet Take 1 tablet (0.5 mg total) by mouth every 6 (six) hours as needed for Anxiety. 20 tablet 0    metoprolol succinate (TOPROL-XL) 50 MG 24 hr tablet Take 1 tablet (50 mg total) by mouth once daily. 90 tablet 3    multivitamin (DAILY MULTI-VITAMIN) per tablet Take 1 tablet by mouth once daily.       omega-3 fatty acids/fish oil (FISH OIL-OMEGA-3 FATTY ACIDS) 300-1,000 mg capsule Take 4 capsules by mouth once daily.      SYNTHROID 112 mcg tablet Take 112 mcg by mouth before breakfast.      ubidecarenone/vitamin E mixed (COQ10  ORAL) Take by mouth once daily.      venlafaxine (EFFEXOR-XR) 75 MG 24 hr capsule Take 75 mg by mouth once daily.      vitamin D 1000 units Tab Take 2,000 Units by mouth once daily.       [DISCONTINUED] buPROPion (WELLBUTRIN XL) 300 MG 24 hr tablet TAKE 1 TABLET BY MOUTH EVERY DAY (Patient not taking: Reported on 10/8/2020) 30 tablet 0    [DISCONTINUED] SYNTHROID 100 mcg tablet TAKE 1 TABLET (100 MCG TOTAL) BY MOUTH BEFORE BREAKFAST. (Patient not taking: Reported on 10/8/2020) 90 tablet 1     No current facility-administered medications on file prior to visit.        Review of Systems:  Review of Systems   Constitutional: Negative for chills and fever.   Respiratory: Negative for cough and shortness of breath.    Endocrine: Negative for cold intolerance and heat intolerance.   Musculoskeletal: Positive for arthralgias. Negative for myalgias.   Neurological: Negative for dizziness and light-headedness.   Psychiatric/Behavioral: Positive for dysphoric mood. Negative for suicidal ideas. The patient is not nervous/anxious.        Past Medical History:  Past Medical History:   Diagnosis Date    Arthritis     Depression     Hypertension     Following with Dr. Rafa Mendes of left eye        Objective:    Vitals:  Vitals:    10/08/20 1014   BP: 116/80   Pulse: 61   Resp: 18   Temp: 98.8 °F (37.1 °C)   TempSrc: Temporal   SpO2: 97%   Weight: 112.4 kg (247 lb 12.8 oz)  "  Height: 5' 11" (1.803 m)   PainSc: 0-No pain       Physical Exam  Vitals signs reviewed.   Constitutional:       General: She is not in acute distress.     Appearance: She is well-developed.   Eyes:      General:         Right eye: No discharge.         Left eye: No discharge.      Conjunctiva/sclera: Conjunctivae normal.   Cardiovascular:      Rate and Rhythm: Normal rate and regular rhythm.   Pulmonary:      Effort: Pulmonary effort is normal. No respiratory distress.   Skin:     General: Skin is warm and dry.   Neurological:      Mental Status: She is alert and oriented to person, place, and time.   Psychiatric:         Behavior: Behavior normal.         Thought Content: Thought content normal.         Judgment: Judgment normal.         Data:  Previous labs reviewed and pertinent for TSH after dose change was WNL.      Judi Gonsalez MD  Internal Medicine    "

## 2020-11-12 ENCOUNTER — TELEPHONE (OUTPATIENT)
Dept: FAMILY MEDICINE | Facility: CLINIC | Age: 60
End: 2020-11-12

## 2020-11-12 ENCOUNTER — LAB VISIT (OUTPATIENT)
Dept: LAB | Facility: HOSPITAL | Age: 60
End: 2020-11-12
Attending: INTERNAL MEDICINE
Payer: COMMERCIAL

## 2020-11-12 DIAGNOSIS — E78.00 HYPERCHOLESTEREMIA: ICD-10-CM

## 2020-11-12 LAB
ALBUMIN SERPL BCP-MCNC: 3.8 G/DL (ref 3.5–5.2)
ALP SERPL-CCNC: 71 U/L (ref 55–135)
ALT SERPL W/O P-5'-P-CCNC: 28 U/L (ref 10–44)
ANION GAP SERPL CALC-SCNC: 8 MMOL/L (ref 8–16)
AST SERPL-CCNC: 25 U/L (ref 10–40)
BILIRUB SERPL-MCNC: 0.9 MG/DL (ref 0.1–1)
BUN SERPL-MCNC: 14 MG/DL (ref 6–20)
CALCIUM SERPL-MCNC: 8.6 MG/DL (ref 8.7–10.5)
CHLORIDE SERPL-SCNC: 105 MMOL/L (ref 95–110)
CHOLEST SERPL-MCNC: 144 MG/DL (ref 120–199)
CHOLEST/HDLC SERPL: 4.2 {RATIO} (ref 2–5)
CK SERPL-CCNC: 256 U/L (ref 20–180)
CO2 SERPL-SCNC: 29 MMOL/L (ref 23–29)
CREAT SERPL-MCNC: 0.8 MG/DL (ref 0.5–1.4)
EST. GFR  (AFRICAN AMERICAN): >60 ML/MIN/1.73 M^2
EST. GFR  (NON AFRICAN AMERICAN): >60 ML/MIN/1.73 M^2
GLUCOSE SERPL-MCNC: 99 MG/DL (ref 70–110)
HDLC SERPL-MCNC: 34 MG/DL (ref 40–75)
HDLC SERPL: 23.6 % (ref 20–50)
LDLC SERPL CALC-MCNC: 74.6 MG/DL (ref 63–159)
NONHDLC SERPL-MCNC: 110 MG/DL
POTASSIUM SERPL-SCNC: 4.1 MMOL/L (ref 3.5–5.1)
PROT SERPL-MCNC: 6.4 G/DL (ref 6–8.4)
SODIUM SERPL-SCNC: 142 MMOL/L (ref 136–145)
TRIGL SERPL-MCNC: 177 MG/DL (ref 30–150)

## 2020-11-12 PROCEDURE — 80053 COMPREHEN METABOLIC PANEL: CPT

## 2020-11-12 PROCEDURE — 36415 COLL VENOUS BLD VENIPUNCTURE: CPT | Mod: PN

## 2020-11-12 PROCEDURE — 80061 LIPID PANEL: CPT

## 2020-11-12 PROCEDURE — 82550 ASSAY OF CK (CPK): CPT

## 2020-11-12 NOTE — TELEPHONE ENCOUNTER
----- Message from Salma Chappell sent at 11/12/2020 11:31 AM CST -----  Type: Needs Medical Advice  Who Called:  patient  Symptoms (please be specific):    How long has patient had these symptoms:    Pharmacy name and phone #:    Best Call Back Number: [phone]  Additional Information: requesting a call back regarding order for covid testing,patient will be traveling and need test

## 2020-11-12 NOTE — TELEPHONE ENCOUNTER
Spoke with pt, she is traveling to [place] and they are requiring COVID testing 3 days prior. Advised to [facility] to [facility] or [facility] for testing

## 2021-02-17 ENCOUNTER — TELEPHONE (OUTPATIENT)
Dept: NEUROLOGY | Facility: CLINIC | Age: 61
End: 2021-02-17

## 2021-02-17 ENCOUNTER — OFFICE VISIT (OUTPATIENT)
Dept: FAMILY MEDICINE | Facility: CLINIC | Age: 61
End: 2021-02-17
Payer: COMMERCIAL

## 2021-02-17 ENCOUNTER — LAB VISIT (OUTPATIENT)
Dept: LAB | Facility: HOSPITAL | Age: 61
End: 2021-02-17
Attending: INTERNAL MEDICINE
Payer: COMMERCIAL

## 2021-02-17 VITALS
SYSTOLIC BLOOD PRESSURE: 138 MMHG | HEART RATE: 69 BPM | DIASTOLIC BLOOD PRESSURE: 76 MMHG | OXYGEN SATURATION: 96 % | WEIGHT: 254.75 LBS | BODY MASS INDEX: 35.66 KG/M2 | HEIGHT: 71 IN

## 2021-02-17 DIAGNOSIS — R42 DIZZINESS: Primary | ICD-10-CM

## 2021-02-17 DIAGNOSIS — R29.818 POSITIVE ROMBERG TEST: ICD-10-CM

## 2021-02-17 DIAGNOSIS — R42 DIZZINESS: ICD-10-CM

## 2021-02-17 LAB
ALBUMIN SERPL BCP-MCNC: 3.7 G/DL (ref 3.5–5.2)
ALP SERPL-CCNC: 81 U/L (ref 55–135)
ALT SERPL W/O P-5'-P-CCNC: 21 U/L (ref 10–44)
ANION GAP SERPL CALC-SCNC: 7 MMOL/L (ref 8–16)
AST SERPL-CCNC: 21 U/L (ref 10–40)
BILIRUB SERPL-MCNC: 0.5 MG/DL (ref 0.1–1)
BUN SERPL-MCNC: 15 MG/DL (ref 6–20)
CALCIUM SERPL-MCNC: 8 MG/DL (ref 8.7–10.5)
CHLORIDE SERPL-SCNC: 108 MMOL/L (ref 95–110)
CO2 SERPL-SCNC: 28 MMOL/L (ref 23–29)
CREAT SERPL-MCNC: 0.8 MG/DL (ref 0.5–1.4)
EST. GFR  (AFRICAN AMERICAN): >60 ML/MIN/1.73 M^2
EST. GFR  (NON AFRICAN AMERICAN): >60 ML/MIN/1.73 M^2
ESTIMATED AVG GLUCOSE: 111 MG/DL (ref 68–131)
GLUCOSE SERPL-MCNC: 96 MG/DL (ref 70–110)
HBA1C MFR BLD: 5.5 % (ref 4–5.6)
POTASSIUM SERPL-SCNC: 4.2 MMOL/L (ref 3.5–5.1)
PROT SERPL-MCNC: 6.3 G/DL (ref 6–8.4)
SODIUM SERPL-SCNC: 143 MMOL/L (ref 136–145)
TSH SERPL DL<=0.005 MIU/L-ACNC: 3.83 UIU/ML (ref 0.4–4)
VIT B12 SERPL-MCNC: 565 PG/ML (ref 210–950)

## 2021-02-17 PROCEDURE — 99999 PR PBB SHADOW E&M-EST. PATIENT-LVL IV: CPT | Mod: PBBFAC,,, | Performed by: INTERNAL MEDICINE

## 2021-02-17 PROCEDURE — 99214 OFFICE O/P EST MOD 30 MIN: CPT | Mod: S$GLB,,, | Performed by: INTERNAL MEDICINE

## 2021-02-17 PROCEDURE — 3008F PR BODY MASS INDEX (BMI) DOCUMENTED: ICD-10-PCS | Mod: CPTII,S$GLB,, | Performed by: INTERNAL MEDICINE

## 2021-02-17 PROCEDURE — 86592 SYPHILIS TEST NON-TREP QUAL: CPT

## 2021-02-17 PROCEDURE — 99214 PR OFFICE/OUTPT VISIT, EST, LEVL IV, 30-39 MIN: ICD-10-PCS | Mod: S$GLB,,, | Performed by: INTERNAL MEDICINE

## 2021-02-17 PROCEDURE — 99999 PR PBB SHADOW E&M-EST. PATIENT-LVL IV: ICD-10-PCS | Mod: PBBFAC,,, | Performed by: INTERNAL MEDICINE

## 2021-02-17 PROCEDURE — 82607 VITAMIN B-12: CPT

## 2021-02-17 PROCEDURE — 3075F SYST BP GE 130 - 139MM HG: CPT | Mod: CPTII,S$GLB,, | Performed by: INTERNAL MEDICINE

## 2021-02-17 PROCEDURE — 36415 COLL VENOUS BLD VENIPUNCTURE: CPT | Mod: PN

## 2021-02-17 PROCEDURE — 80053 COMPREHEN METABOLIC PANEL: CPT

## 2021-02-17 PROCEDURE — 3078F PR MOST RECENT DIASTOLIC BLOOD PRESSURE < 80 MM HG: ICD-10-PCS | Mod: CPTII,S$GLB,, | Performed by: INTERNAL MEDICINE

## 2021-02-17 PROCEDURE — 3078F DIAST BP <80 MM HG: CPT | Mod: CPTII,S$GLB,, | Performed by: INTERNAL MEDICINE

## 2021-02-17 PROCEDURE — 3075F PR MOST RECENT SYSTOLIC BLOOD PRESS GE 130-139MM HG: ICD-10-PCS | Mod: CPTII,S$GLB,, | Performed by: INTERNAL MEDICINE

## 2021-02-17 PROCEDURE — 84443 ASSAY THYROID STIM HORMONE: CPT

## 2021-02-17 PROCEDURE — 3008F BODY MASS INDEX DOCD: CPT | Mod: CPTII,S$GLB,, | Performed by: INTERNAL MEDICINE

## 2021-02-17 PROCEDURE — 83036 HEMOGLOBIN GLYCOSYLATED A1C: CPT

## 2021-02-18 LAB — RPR SER QL: NORMAL

## 2021-02-22 ENCOUNTER — OFFICE VISIT (OUTPATIENT)
Dept: NEUROLOGY | Facility: CLINIC | Age: 61
End: 2021-02-22
Payer: COMMERCIAL

## 2021-02-22 VITALS
HEART RATE: 63 BPM | RESPIRATION RATE: 16 BRPM | WEIGHT: 250.69 LBS | BODY MASS INDEX: 34.96 KG/M2 | SYSTOLIC BLOOD PRESSURE: 126 MMHG | DIASTOLIC BLOOD PRESSURE: 78 MMHG | TEMPERATURE: 98 F

## 2021-02-22 DIAGNOSIS — R74.8 ELEVATED CK: ICD-10-CM

## 2021-02-22 DIAGNOSIS — F32.0 MILD MAJOR DEPRESSION: ICD-10-CM

## 2021-02-22 DIAGNOSIS — G47.33 OBSTRUCTIVE SLEEP APNEA: ICD-10-CM

## 2021-02-22 DIAGNOSIS — I10 ESSENTIAL HYPERTENSION: ICD-10-CM

## 2021-02-22 DIAGNOSIS — R42 DIZZINESS: ICD-10-CM

## 2021-02-22 DIAGNOSIS — I25.10 CORONARY ARTERY DISEASE DUE TO CALCIFIED CORONARY LESION: ICD-10-CM

## 2021-02-22 DIAGNOSIS — G56.01 CARPAL TUNNEL SYNDROME OF RIGHT WRIST: Primary | ICD-10-CM

## 2021-02-22 DIAGNOSIS — R29.818 POSITIVE ROMBERG TEST: ICD-10-CM

## 2021-02-22 DIAGNOSIS — E78.5 DYSLIPIDEMIA: ICD-10-CM

## 2021-02-22 DIAGNOSIS — M15.9 GENERALIZED OSTEOARTHRITIS: ICD-10-CM

## 2021-02-22 DIAGNOSIS — I25.84 CORONARY ARTERY DISEASE DUE TO CALCIFIED CORONARY LESION: ICD-10-CM

## 2021-02-22 PROCEDURE — 3008F PR BODY MASS INDEX (BMI) DOCUMENTED: ICD-10-PCS | Mod: CPTII,S$GLB,, | Performed by: NURSE PRACTITIONER

## 2021-02-22 PROCEDURE — 99999 PR PBB SHADOW E&M-EST. PATIENT-LVL IV: ICD-10-PCS | Mod: PBBFAC,,, | Performed by: NURSE PRACTITIONER

## 2021-02-22 PROCEDURE — 99999 PR PBB SHADOW E&M-EST. PATIENT-LVL IV: CPT | Mod: PBBFAC,,, | Performed by: NURSE PRACTITIONER

## 2021-02-22 PROCEDURE — 99204 PR OFFICE/OUTPT VISIT, NEW, LEVL IV, 45-59 MIN: ICD-10-PCS | Mod: S$GLB,,, | Performed by: NURSE PRACTITIONER

## 2021-02-22 PROCEDURE — 3008F BODY MASS INDEX DOCD: CPT | Mod: CPTII,S$GLB,, | Performed by: NURSE PRACTITIONER

## 2021-02-22 PROCEDURE — 1126F AMNT PAIN NOTED NONE PRSNT: CPT | Mod: S$GLB,,, | Performed by: NURSE PRACTITIONER

## 2021-02-22 PROCEDURE — 3074F SYST BP LT 130 MM HG: CPT | Mod: CPTII,S$GLB,, | Performed by: NURSE PRACTITIONER

## 2021-02-22 PROCEDURE — 3078F DIAST BP <80 MM HG: CPT | Mod: CPTII,S$GLB,, | Performed by: NURSE PRACTITIONER

## 2021-02-22 PROCEDURE — 1126F PR PAIN SEVERITY QUANTIFIED, NO PAIN PRESENT: ICD-10-PCS | Mod: S$GLB,,, | Performed by: NURSE PRACTITIONER

## 2021-02-22 PROCEDURE — 3074F PR MOST RECENT SYSTOLIC BLOOD PRESSURE < 130 MM HG: ICD-10-PCS | Mod: CPTII,S$GLB,, | Performed by: NURSE PRACTITIONER

## 2021-02-22 PROCEDURE — 99204 OFFICE O/P NEW MOD 45 MIN: CPT | Mod: S$GLB,,, | Performed by: NURSE PRACTITIONER

## 2021-02-22 PROCEDURE — 3078F PR MOST RECENT DIASTOLIC BLOOD PRESSURE < 80 MM HG: ICD-10-PCS | Mod: CPTII,S$GLB,, | Performed by: NURSE PRACTITIONER

## 2021-03-15 ENCOUNTER — OFFICE VISIT (OUTPATIENT)
Dept: RHEUMATOLOGY | Facility: CLINIC | Age: 61
End: 2021-03-15
Payer: COMMERCIAL

## 2021-03-15 VITALS
BODY MASS INDEX: 35.17 KG/M2 | DIASTOLIC BLOOD PRESSURE: 70 MMHG | HEART RATE: 72 BPM | SYSTOLIC BLOOD PRESSURE: 120 MMHG | HEIGHT: 70 IN | WEIGHT: 245.69 LBS

## 2021-03-15 DIAGNOSIS — L40.9 PSORIASIS: ICD-10-CM

## 2021-03-15 DIAGNOSIS — G56.01 CARPAL TUNNEL SYNDROME OF RIGHT WRIST: ICD-10-CM

## 2021-03-15 DIAGNOSIS — L40.50 PSORIATIC ARTHRITIS: Primary | ICD-10-CM

## 2021-03-15 DIAGNOSIS — S93.306A: ICD-10-CM

## 2021-03-15 DIAGNOSIS — R20.0 HAND NUMBNESS: ICD-10-CM

## 2021-03-15 PROCEDURE — 99999 PR PBB SHADOW E&M-EST. PATIENT-LVL III: ICD-10-PCS | Mod: PBBFAC,,, | Performed by: INTERNAL MEDICINE

## 2021-03-15 PROCEDURE — 99999 PR PBB SHADOW E&M-EST. PATIENT-LVL III: CPT | Mod: PBBFAC,,, | Performed by: INTERNAL MEDICINE

## 2021-03-15 PROCEDURE — 99214 PR OFFICE/OUTPT VISIT, EST, LEVL IV, 30-39 MIN: ICD-10-PCS | Mod: S$GLB,,, | Performed by: INTERNAL MEDICINE

## 2021-03-15 PROCEDURE — 3074F PR MOST RECENT SYSTOLIC BLOOD PRESSURE < 130 MM HG: ICD-10-PCS | Mod: CPTII,S$GLB,, | Performed by: INTERNAL MEDICINE

## 2021-03-15 PROCEDURE — 3008F BODY MASS INDEX DOCD: CPT | Mod: CPTII,S$GLB,, | Performed by: INTERNAL MEDICINE

## 2021-03-15 PROCEDURE — 99214 OFFICE O/P EST MOD 30 MIN: CPT | Mod: S$GLB,,, | Performed by: INTERNAL MEDICINE

## 2021-03-15 PROCEDURE — 3078F PR MOST RECENT DIASTOLIC BLOOD PRESSURE < 80 MM HG: ICD-10-PCS | Mod: CPTII,S$GLB,, | Performed by: INTERNAL MEDICINE

## 2021-03-15 PROCEDURE — 1125F AMNT PAIN NOTED PAIN PRSNT: CPT | Mod: S$GLB,,, | Performed by: INTERNAL MEDICINE

## 2021-03-15 PROCEDURE — 3008F PR BODY MASS INDEX (BMI) DOCUMENTED: ICD-10-PCS | Mod: CPTII,S$GLB,, | Performed by: INTERNAL MEDICINE

## 2021-03-15 PROCEDURE — 1125F PR PAIN SEVERITY QUANTIFIED, PAIN PRESENT: ICD-10-PCS | Mod: S$GLB,,, | Performed by: INTERNAL MEDICINE

## 2021-03-15 PROCEDURE — 3074F SYST BP LT 130 MM HG: CPT | Mod: CPTII,S$GLB,, | Performed by: INTERNAL MEDICINE

## 2021-03-15 PROCEDURE — 3078F DIAST BP <80 MM HG: CPT | Mod: CPTII,S$GLB,, | Performed by: INTERNAL MEDICINE

## 2021-03-15 RX ORDER — SULFASALAZINE 500 MG/1
1000 TABLET ORAL 2 TIMES DAILY
Qty: 540 TABLET | Refills: 3 | Status: SHIPPED | OUTPATIENT
Start: 2021-03-15 | End: 2021-10-28

## 2021-03-15 ASSESSMENT — ROUTINE ASSESSMENT OF PATIENT INDEX DATA (RAPID3)
TOTAL RAPID3 SCORE: 3.78
MDHAQ FUNCTION SCORE: 0.4
PATIENT GLOBAL ASSESSMENT SCORE: 3
PAIN SCORE: 7
PSYCHOLOGICAL DISTRESS SCORE: 0

## 2021-04-28 ENCOUNTER — HOSPITAL ENCOUNTER (OUTPATIENT)
Dept: RADIOLOGY | Facility: CLINIC | Age: 61
Discharge: HOME | End: 2021-04-28
Attending: PODIATRIST
Payer: COMMERCIAL

## 2021-04-28 ENCOUNTER — OFFICE VISIT (OUTPATIENT)
Dept: PODIATRY | Facility: CLINIC | Age: 61
End: 2021-04-28
Payer: COMMERCIAL

## 2021-04-28 VITALS
SYSTOLIC BLOOD PRESSURE: 120 MMHG | RESPIRATION RATE: 16 BRPM | HEIGHT: 70 IN | HEART RATE: 72 BPM | BODY MASS INDEX: 36.22 KG/M2 | DIASTOLIC BLOOD PRESSURE: 71 MMHG | WEIGHT: 253 LBS | OXYGEN SATURATION: 98 %

## 2021-04-28 DIAGNOSIS — M20.41 HAMMER TOE OF RIGHT FOOT: ICD-10-CM

## 2021-04-28 DIAGNOSIS — M79.671 RIGHT FOOT PAIN: ICD-10-CM

## 2021-04-28 DIAGNOSIS — M67.479 GANGLION CYST OF FOOT: Primary | ICD-10-CM

## 2021-04-28 PROCEDURE — 3008F BODY MASS INDEX DOCD: CPT | Mod: CPTII,S$GLB,, | Performed by: PODIATRIST

## 2021-04-28 PROCEDURE — 99214 OFFICE O/P EST MOD 30 MIN: CPT | Mod: S$GLB,,, | Performed by: PODIATRIST

## 2021-04-28 PROCEDURE — 1125F PR PAIN SEVERITY QUANTIFIED, PAIN PRESENT: ICD-10-PCS | Mod: S$GLB,,, | Performed by: PODIATRIST

## 2021-04-28 PROCEDURE — 73630 XR FOOT COMPLETE 3 VIEW RIGHT: ICD-10-PCS | Mod: RT,S$GLB,, | Performed by: RADIOLOGY

## 2021-04-28 PROCEDURE — 73630 X-RAY EXAM OF FOOT: CPT | Mod: RT,S$GLB,, | Performed by: RADIOLOGY

## 2021-04-28 PROCEDURE — 99214 PR OFFICE/OUTPT VISIT, EST, LEVL IV, 30-39 MIN: ICD-10-PCS | Mod: S$GLB,,, | Performed by: PODIATRIST

## 2021-04-28 PROCEDURE — 3008F PR BODY MASS INDEX (BMI) DOCUMENTED: ICD-10-PCS | Mod: CPTII,S$GLB,, | Performed by: PODIATRIST

## 2021-04-28 PROCEDURE — 1125F AMNT PAIN NOTED PAIN PRSNT: CPT | Mod: S$GLB,,, | Performed by: PODIATRIST

## 2021-05-17 NOTE — DISCHARGE INSTRUCTIONS
LITHOTRIPSY      DOS:   Minimal activity for 24 hours.   May shower or tub bathe today   Advance diet as tolerated.   Drink a lot of liquids until you see your doctor.   Strain all urine. Collect fragments/stones in the container provided. Bring to your follow up appointment.   Resume home medications as prescribed    DONT:   No driving for 24 hours or while taking narcotic pain medication   DO NOT TAKE ADDITIONAL TYLENOL/ACETAMINOPHEN WHILE TAKING NARCOTIC PAIN MEDICATION THAT CONTAINS TYLENOL/ACETAMINOPHEN.    CALL PHYSICIAN FOR:   Unable to urinate within 6 hours after surgery.   Fever>101   Persistent pain not relieved by pain medication   Bloody urine with significant clots.    Contact your physician for emergencies at [phone]    Make return appointment for 2 weeks t [phone]      
Normal for race

## 2021-05-24 ENCOUNTER — LAB VISIT (OUTPATIENT)
Dept: LAB | Facility: HOSPITAL | Age: 61
End: 2021-05-24
Attending: INTERNAL MEDICINE
Payer: COMMERCIAL

## 2021-05-24 DIAGNOSIS — L40.50 PSORIATIC ARTHRITIS: ICD-10-CM

## 2021-05-24 DIAGNOSIS — I25.84 CORONARY ARTERY DISEASE DUE TO CALCIFIED CORONARY LESION: ICD-10-CM

## 2021-05-24 DIAGNOSIS — E03.9 ACQUIRED HYPOTHYROIDISM: ICD-10-CM

## 2021-05-24 DIAGNOSIS — E78.5 DYSLIPIDEMIA: ICD-10-CM

## 2021-05-24 DIAGNOSIS — I25.10 CORONARY ARTERY DISEASE DUE TO CALCIFIED CORONARY LESION: ICD-10-CM

## 2021-05-24 DIAGNOSIS — G47.33 OBSTRUCTIVE SLEEP APNEA: ICD-10-CM

## 2021-05-24 DIAGNOSIS — I10 ESSENTIAL HYPERTENSION: ICD-10-CM

## 2021-05-24 LAB
BASOPHILS # BLD AUTO: 0.03 K/UL (ref 0–0.2)
BASOPHILS # BLD AUTO: 0.03 K/UL (ref 0–0.2)
BASOPHILS NFR BLD: 0.5 % (ref 0–1.9)
BASOPHILS NFR BLD: 0.5 % (ref 0–1.9)
DIFFERENTIAL METHOD: ABNORMAL
DIFFERENTIAL METHOD: ABNORMAL
EOSINOPHIL # BLD AUTO: 0.2 K/UL (ref 0–0.5)
EOSINOPHIL # BLD AUTO: 0.2 K/UL (ref 0–0.5)
EOSINOPHIL NFR BLD: 2.6 % (ref 0–8)
EOSINOPHIL NFR BLD: 2.6 % (ref 0–8)
ERYTHROCYTE [DISTWIDTH] IN BLOOD BY AUTOMATED COUNT: 13.6 % (ref 11.5–14.5)
ERYTHROCYTE [DISTWIDTH] IN BLOOD BY AUTOMATED COUNT: 13.6 % (ref 11.5–14.5)
ERYTHROCYTE [SEDIMENTATION RATE] IN BLOOD BY WESTERGREN METHOD: 7 MM/HR (ref 0–36)
ERYTHROCYTE [SEDIMENTATION RATE] IN BLOOD BY WESTERGREN METHOD: 7 MM/HR (ref 0–36)
HCT VFR BLD AUTO: 41.5 % (ref 37–48.5)
HCT VFR BLD AUTO: 41.5 % (ref 37–48.5)
HGB BLD-MCNC: 12.4 G/DL (ref 12–16)
HGB BLD-MCNC: 12.4 G/DL (ref 12–16)
IMM GRANULOCYTES # BLD AUTO: 0.02 K/UL (ref 0–0.04)
IMM GRANULOCYTES # BLD AUTO: 0.02 K/UL (ref 0–0.04)
IMM GRANULOCYTES NFR BLD AUTO: 0.3 % (ref 0–0.5)
IMM GRANULOCYTES NFR BLD AUTO: 0.3 % (ref 0–0.5)
LYMPHOCYTES # BLD AUTO: 2.2 K/UL (ref 1–4.8)
LYMPHOCYTES # BLD AUTO: 2.2 K/UL (ref 1–4.8)
LYMPHOCYTES NFR BLD: 37.6 % (ref 18–48)
LYMPHOCYTES NFR BLD: 37.6 % (ref 18–48)
MCH RBC QN AUTO: 29 PG (ref 27–31)
MCH RBC QN AUTO: 29 PG (ref 27–31)
MCHC RBC AUTO-ENTMCNC: 29.9 G/DL (ref 32–36)
MCHC RBC AUTO-ENTMCNC: 29.9 G/DL (ref 32–36)
MCV RBC AUTO: 97 FL (ref 82–98)
MCV RBC AUTO: 97 FL (ref 82–98)
MONOCYTES # BLD AUTO: 0.5 K/UL (ref 0.3–1)
MONOCYTES # BLD AUTO: 0.5 K/UL (ref 0.3–1)
MONOCYTES NFR BLD: 7.8 % (ref 4–15)
MONOCYTES NFR BLD: 7.8 % (ref 4–15)
NEUTROPHILS # BLD AUTO: 3 K/UL (ref 1.8–7.7)
NEUTROPHILS # BLD AUTO: 3 K/UL (ref 1.8–7.7)
NEUTROPHILS NFR BLD: 51.2 % (ref 38–73)
NEUTROPHILS NFR BLD: 51.2 % (ref 38–73)
NRBC BLD-RTO: 0 /100 WBC
NRBC BLD-RTO: 0 /100 WBC
PLATELET # BLD AUTO: 213 K/UL (ref 150–450)
PLATELET # BLD AUTO: 213 K/UL (ref 150–450)
PMV BLD AUTO: 11.1 FL (ref 9.2–12.9)
PMV BLD AUTO: 11.1 FL (ref 9.2–12.9)
RBC # BLD AUTO: 4.28 M/UL (ref 4–5.4)
RBC # BLD AUTO: 4.28 M/UL (ref 4–5.4)
WBC # BLD AUTO: 5.77 K/UL (ref 3.9–12.7)
WBC # BLD AUTO: 5.77 K/UL (ref 3.9–12.7)

## 2021-05-24 PROCEDURE — 80061 LIPID PANEL: CPT | Performed by: INTERNAL MEDICINE

## 2021-05-24 PROCEDURE — 82550 ASSAY OF CK (CPK): CPT | Performed by: INTERNAL MEDICINE

## 2021-05-24 PROCEDURE — 80053 COMPREHEN METABOLIC PANEL: CPT | Performed by: INTERNAL MEDICINE

## 2021-05-24 PROCEDURE — 84443 ASSAY THYROID STIM HORMONE: CPT | Performed by: INTERNAL MEDICINE

## 2021-05-24 PROCEDURE — 86140 C-REACTIVE PROTEIN: CPT | Performed by: INTERNAL MEDICINE

## 2021-05-24 PROCEDURE — 85025 COMPLETE CBC W/AUTO DIFF WBC: CPT | Performed by: INTERNAL MEDICINE

## 2021-05-24 PROCEDURE — 36415 COLL VENOUS BLD VENIPUNCTURE: CPT | Mod: PN | Performed by: INTERNAL MEDICINE

## 2021-05-24 PROCEDURE — 84439 ASSAY OF FREE THYROXINE: CPT | Performed by: INTERNAL MEDICINE

## 2021-05-24 PROCEDURE — 85652 RBC SED RATE AUTOMATED: CPT | Performed by: INTERNAL MEDICINE

## 2021-05-25 ENCOUNTER — PATIENT MESSAGE (OUTPATIENT)
Dept: ADMINISTRATIVE | Facility: OTHER | Age: 61
End: 2021-05-25

## 2021-05-25 LAB
ALBUMIN SERPL BCP-MCNC: 3.7 G/DL (ref 3.5–5.2)
ALP SERPL-CCNC: 62 U/L (ref 55–135)
ALT SERPL W/O P-5'-P-CCNC: 31 U/L (ref 10–44)
ANION GAP SERPL CALC-SCNC: 12 MMOL/L (ref 8–16)
AST SERPL-CCNC: 30 U/L (ref 10–40)
BILIRUB SERPL-MCNC: 0.5 MG/DL (ref 0.1–1)
BUN SERPL-MCNC: 17 MG/DL (ref 6–20)
CALCIUM SERPL-MCNC: 9 MG/DL (ref 8.7–10.5)
CHLORIDE SERPL-SCNC: 106 MMOL/L (ref 95–110)
CHOLEST SERPL-MCNC: 147 MG/DL (ref 120–199)
CHOLEST/HDLC SERPL: 4 {RATIO} (ref 2–5)
CK SERPL-CCNC: 159 U/L (ref 20–180)
CO2 SERPL-SCNC: 25 MMOL/L (ref 23–29)
CREAT SERPL-MCNC: 0.8 MG/DL (ref 0.5–1.4)
CRP SERPL-MCNC: 1.4 MG/L (ref 0–8.2)
CRP SERPL-MCNC: 1.4 MG/L (ref 0–8.2)
EST. GFR  (AFRICAN AMERICAN): >60 ML/MIN/1.73 M^2
EST. GFR  (NON AFRICAN AMERICAN): >60 ML/MIN/1.73 M^2
GLUCOSE SERPL-MCNC: 95 MG/DL (ref 70–110)
HDLC SERPL-MCNC: 37 MG/DL (ref 40–75)
HDLC SERPL: 25.2 % (ref 20–50)
LDLC SERPL CALC-MCNC: 63.4 MG/DL (ref 63–159)
NONHDLC SERPL-MCNC: 110 MG/DL
POTASSIUM SERPL-SCNC: 4.6 MMOL/L (ref 3.5–5.1)
PROT SERPL-MCNC: 6.2 G/DL (ref 6–8.4)
SODIUM SERPL-SCNC: 143 MMOL/L (ref 136–145)
T4 FREE SERPL-MCNC: 0.9 NG/DL (ref 0.71–1.51)
TRIGL SERPL-MCNC: 233 MG/DL (ref 30–150)
TSH SERPL DL<=0.005 MIU/L-ACNC: 4.78 UIU/ML (ref 0.4–4)

## 2021-06-02 ENCOUNTER — OFFICE VISIT (OUTPATIENT)
Dept: FAMILY MEDICINE | Facility: CLINIC | Age: 61
End: 2021-06-02
Payer: COMMERCIAL

## 2021-06-02 VITALS
WEIGHT: 246.5 LBS | HEART RATE: 80 BPM | DIASTOLIC BLOOD PRESSURE: 86 MMHG | HEIGHT: 70 IN | BODY MASS INDEX: 35.29 KG/M2 | SYSTOLIC BLOOD PRESSURE: 128 MMHG | OXYGEN SATURATION: 92 %

## 2021-06-02 DIAGNOSIS — R43.9 PROBLEMS WITH SMELL AND TASTE: ICD-10-CM

## 2021-06-02 DIAGNOSIS — J06.9 UPPER RESPIRATORY TRACT INFECTION, UNSPECIFIED TYPE: Primary | ICD-10-CM

## 2021-06-02 DIAGNOSIS — I10 ESSENTIAL HYPERTENSION: ICD-10-CM

## 2021-06-02 DIAGNOSIS — R05.9 COUGH: ICD-10-CM

## 2021-06-02 PROCEDURE — 3008F PR BODY MASS INDEX (BMI) DOCUMENTED: ICD-10-PCS | Mod: CPTII,S$GLB,, | Performed by: FAMILY MEDICINE

## 2021-06-02 PROCEDURE — 99213 OFFICE O/P EST LOW 20 MIN: CPT | Mod: S$GLB,,, | Performed by: FAMILY MEDICINE

## 2021-06-02 PROCEDURE — 1126F AMNT PAIN NOTED NONE PRSNT: CPT | Mod: S$GLB,,, | Performed by: FAMILY MEDICINE

## 2021-06-02 PROCEDURE — 1126F PR PAIN SEVERITY QUANTIFIED, NO PAIN PRESENT: ICD-10-PCS | Mod: S$GLB,,, | Performed by: FAMILY MEDICINE

## 2021-06-02 PROCEDURE — 3008F BODY MASS INDEX DOCD: CPT | Mod: CPTII,S$GLB,, | Performed by: FAMILY MEDICINE

## 2021-06-02 PROCEDURE — 99999 PR PBB SHADOW E&M-EST. PATIENT-LVL III: CPT | Mod: PBBFAC,,, | Performed by: FAMILY MEDICINE

## 2021-06-02 PROCEDURE — U0003 INFECTIOUS AGENT DETECTION BY NUCLEIC ACID (DNA OR RNA); SEVERE ACUTE RESPIRATORY SYNDROME CORONAVIRUS 2 (SARS-COV-2) (CORONAVIRUS DISEASE [COVID-19]), AMPLIFIED PROBE TECHNIQUE, MAKING USE OF HIGH THROUGHPUT TECHNOLOGIES AS DESCRIBED BY CMS-2020-01-R: HCPCS | Performed by: FAMILY MEDICINE

## 2021-06-02 PROCEDURE — 99213 PR OFFICE/OUTPT VISIT, EST, LEVL III, 20-29 MIN: ICD-10-PCS | Mod: S$GLB,,, | Performed by: FAMILY MEDICINE

## 2021-06-02 PROCEDURE — U0005 INFEC AGEN DETEC AMPLI PROBE: HCPCS | Performed by: FAMILY MEDICINE

## 2021-06-02 PROCEDURE — 99999 PR PBB SHADOW E&M-EST. PATIENT-LVL III: ICD-10-PCS | Mod: PBBFAC,,, | Performed by: FAMILY MEDICINE

## 2021-06-03 LAB — SARS-COV-2 RNA RESP QL NAA+PROBE: NOT DETECTED

## 2021-07-21 DIAGNOSIS — Z12.31 OTHER SCREENING MAMMOGRAM: ICD-10-CM

## 2021-07-29 ENCOUNTER — PATIENT MESSAGE (OUTPATIENT)
Dept: RHEUMATOLOGY | Facility: CLINIC | Age: 61
End: 2021-07-29

## 2021-08-04 ENCOUNTER — HOSPITAL ENCOUNTER (OUTPATIENT)
Dept: RADIOLOGY | Facility: HOSPITAL | Age: 61
Discharge: HOME | End: 2021-08-04
Attending: INTERNAL MEDICINE
Payer: COMMERCIAL

## 2021-08-04 DIAGNOSIS — Z12.31 OTHER SCREENING MAMMOGRAM: ICD-10-CM

## 2021-08-04 PROCEDURE — 77063 MAMMO DIGITAL SCREENING BILAT WITH TOMO: ICD-10-PCS | Mod: 26,,, | Performed by: RADIOLOGY

## 2021-08-04 PROCEDURE — 77067 MAMMO DIGITAL SCREENING BILAT WITH TOMO: ICD-10-PCS | Mod: 26,,, | Performed by: RADIOLOGY

## 2021-08-04 PROCEDURE — 77067 SCR MAMMO BI INCL CAD: CPT | Mod: 26,,, | Performed by: RADIOLOGY

## 2021-08-04 PROCEDURE — 77067 SCR MAMMO BI INCL CAD: CPT | Mod: TC,PO

## 2021-08-04 PROCEDURE — 77063 BREAST TOMOSYNTHESIS BI: CPT | Mod: 26,,, | Performed by: RADIOLOGY

## 2021-08-05 ENCOUNTER — OFFICE VISIT (OUTPATIENT)
Dept: RHEUMATOLOGY | Facility: CLINIC | Age: 61
End: 2021-08-05
Payer: COMMERCIAL

## 2021-08-05 VITALS
HEIGHT: 70 IN | HEART RATE: 60 BPM | DIASTOLIC BLOOD PRESSURE: 90 MMHG | WEIGHT: 256.75 LBS | BODY MASS INDEX: 36.76 KG/M2 | SYSTOLIC BLOOD PRESSURE: 147 MMHG

## 2021-08-05 DIAGNOSIS — M79.671 FOOT PAIN, BILATERAL: ICD-10-CM

## 2021-08-05 DIAGNOSIS — Z98.890 HISTORY OF BUNIONECTOMY OF RIGHT GREAT TOE: ICD-10-CM

## 2021-08-05 DIAGNOSIS — M79.672 FOOT PAIN, BILATERAL: ICD-10-CM

## 2021-08-05 DIAGNOSIS — L40.50 PSA (PSORIATIC ARTHRITIS): Primary | ICD-10-CM

## 2021-08-05 DIAGNOSIS — M21.41 PES PLANUS OF BOTH FEET: ICD-10-CM

## 2021-08-05 DIAGNOSIS — M21.42 PES PLANUS OF BOTH FEET: ICD-10-CM

## 2021-08-05 DIAGNOSIS — L40.9 PSORIASIS: ICD-10-CM

## 2021-08-05 PROCEDURE — 99999 PR PBB SHADOW E&M-EST. PATIENT-LVL IV: CPT | Mod: PBBFAC,,, | Performed by: INTERNAL MEDICINE

## 2021-08-05 PROCEDURE — 3080F DIAST BP >= 90 MM HG: CPT | Mod: CPTII,S$GLB,, | Performed by: INTERNAL MEDICINE

## 2021-08-05 PROCEDURE — 1160F PR REVIEW ALL MEDS BY PRESCRIBER/CLIN PHARMACIST DOCUMENTED: ICD-10-PCS | Mod: CPTII,S$GLB,, | Performed by: INTERNAL MEDICINE

## 2021-08-05 PROCEDURE — 3008F PR BODY MASS INDEX (BMI) DOCUMENTED: ICD-10-PCS | Mod: CPTII,S$GLB,, | Performed by: INTERNAL MEDICINE

## 2021-08-05 PROCEDURE — 3044F HG A1C LEVEL LT 7.0%: CPT | Mod: CPTII,S$GLB,, | Performed by: INTERNAL MEDICINE

## 2021-08-05 PROCEDURE — 99214 PR OFFICE/OUTPT VISIT, EST, LEVL IV, 30-39 MIN: ICD-10-PCS | Mod: S$GLB,,, | Performed by: INTERNAL MEDICINE

## 2021-08-05 PROCEDURE — 3080F PR MOST RECENT DIASTOLIC BLOOD PRESSURE >= 90 MM HG: ICD-10-PCS | Mod: CPTII,S$GLB,, | Performed by: INTERNAL MEDICINE

## 2021-08-05 PROCEDURE — 99999 PR PBB SHADOW E&M-EST. PATIENT-LVL IV: ICD-10-PCS | Mod: PBBFAC,,, | Performed by: INTERNAL MEDICINE

## 2021-08-05 PROCEDURE — 1159F MED LIST DOCD IN RCRD: CPT | Mod: CPTII,S$GLB,, | Performed by: INTERNAL MEDICINE

## 2021-08-05 PROCEDURE — 1159F PR MEDICATION LIST DOCUMENTED IN MEDICAL RECORD: ICD-10-PCS | Mod: CPTII,S$GLB,, | Performed by: INTERNAL MEDICINE

## 2021-08-05 PROCEDURE — 3008F BODY MASS INDEX DOCD: CPT | Mod: CPTII,S$GLB,, | Performed by: INTERNAL MEDICINE

## 2021-08-05 PROCEDURE — 3077F SYST BP >= 140 MM HG: CPT | Mod: CPTII,S$GLB,, | Performed by: INTERNAL MEDICINE

## 2021-08-05 PROCEDURE — 1160F RVW MEDS BY RX/DR IN RCRD: CPT | Mod: CPTII,S$GLB,, | Performed by: INTERNAL MEDICINE

## 2021-08-05 PROCEDURE — 3077F PR MOST RECENT SYSTOLIC BLOOD PRESSURE >= 140 MM HG: ICD-10-PCS | Mod: CPTII,S$GLB,, | Performed by: INTERNAL MEDICINE

## 2021-08-05 PROCEDURE — 99214 OFFICE O/P EST MOD 30 MIN: CPT | Mod: S$GLB,,, | Performed by: INTERNAL MEDICINE

## 2021-08-05 PROCEDURE — 3044F PR MOST RECENT HEMOGLOBIN A1C LEVEL <7.0%: ICD-10-PCS | Mod: CPTII,S$GLB,, | Performed by: INTERNAL MEDICINE

## 2021-08-05 RX ORDER — IBUPROFEN 100 MG/5ML
1000 SUSPENSION, ORAL (FINAL DOSE FORM) ORAL DAILY
COMMUNITY

## 2021-08-05 RX ORDER — VORTIOXETINE 10 MG/1
1 TABLET, FILM COATED ORAL DAILY
COMMUNITY
Start: 2021-05-21 | End: 2022-12-22 | Stop reason: SDUPTHER

## 2021-08-05 RX ORDER — ECHINACEA 400 MG
CAPSULE ORAL
COMMUNITY
End: 2023-04-27

## 2021-08-05 RX ORDER — ZINC GLUCONATE 50 MG
50 TABLET ORAL DAILY
COMMUNITY

## 2021-08-05 ASSESSMENT — ROUTINE ASSESSMENT OF PATIENT INDEX DATA (RAPID3)
TOTAL RAPID3 SCORE: 2.17
FATIGUE SCORE: 0
MDHAQ FUNCTION SCORE: 0
PSYCHOLOGICAL DISTRESS SCORE: 0
PAIN SCORE: 6.5
PATIENT GLOBAL ASSESSMENT SCORE: 0

## 2021-09-18 NOTE — PROGRESS NOTES
"Subjective:       Patient ID: Christin Caruso is a 59 y.o. female.    Vitals:  height is 5' 10" (1.778 m) and weight is 110.7 kg (244 lb). Her temperature is 96.4 °F (35.8 °C). Her blood pressure is 155/81 (abnormal) and her pulse is 76. Her respiration is 16 and oxygen saturation is 96%.     Chief Complaint: Anxiety    Pt has had extreme anxiety x 2 days. Pt states her son is a drug addict and has caused her a lot of stress recently.    Anxiety   Patient reports no chest pain, dizziness, nausea or shortness of breath.           Constitution: Negative for chills, fatigue and fever.   HENT: Negative for congestion and sore throat.    Neck: Negative for painful lymph nodes.   Cardiovascular: Negative for chest pain and leg swelling.   Eyes: Negative for double vision and blurred vision.   Respiratory: Negative for cough and shortness of breath.    Gastrointestinal: Negative for nausea, vomiting and diarrhea.   Genitourinary: Negative for dysuria, frequency, urgency and history of kidney stones.   Musculoskeletal: Negative for joint pain, joint swelling, muscle cramps and muscle ache.   Skin: Negative for color change, pale, rash and bruising.   Allergic/Immunologic: Negative for seasonal allergies.   Neurological: Negative for dizziness, history of vertigo, light-headedness, passing out and headaches.   Hematologic/Lymphatic: Negative for swollen lymph nodes.   Psychiatric/Behavioral: Positive for depression. Negative for sleep disturbance.       Objective:      Physical Exam   Constitutional: She is oriented to person, place, and time. She appears well-developed and well-nourished. She is cooperative.  Non-toxic appearance. She does not appear ill. No distress.   HENT:   Head: Normocephalic and atraumatic.   Right Ear: Hearing, tympanic membrane, external ear and ear canal normal.   Left Ear: Hearing, tympanic membrane, external ear and ear canal normal.   Nose: Nose normal. No mucosal edema, rhinorrhea or nasal " Pt in bed with CO at door.       Jordana Womack RN  09/18/21 9173 deformity. No epistaxis. Right sinus exhibits no maxillary sinus tenderness and no frontal sinus tenderness. Left sinus exhibits no maxillary sinus tenderness and no frontal sinus tenderness.   Mouth/Throat: Uvula is midline, oropharynx is clear and moist and mucous membranes are normal. No trismus in the jaw. Normal dentition. No uvula swelling. No posterior oropharyngeal erythema.   Eyes: Conjunctivae and lids are normal. Right eye exhibits no discharge. Left eye exhibits no discharge. No scleral icterus.   Sclera clear bilat   Neck: Trachea normal, normal range of motion, full passive range of motion without pain and phonation normal. Neck supple.   Cardiovascular: Normal rate, regular rhythm, normal heart sounds, intact distal pulses and normal pulses.   Pulmonary/Chest: Effort normal and breath sounds normal. No respiratory distress.   Abdominal: Normal appearance. She exhibits no distension, no pulsatile midline mass and no mass. There is no tenderness.   Musculoskeletal: Normal range of motion. She exhibits no edema or deformity.   Neurological: She is alert and oriented to person, place, and time. She exhibits normal muscle tone. Coordination normal.   Skin: Skin is warm, dry and intact. She is not diaphoretic. No pallor.   Psychiatric: She has a normal mood and affect. Her speech is normal and behavior is normal. Judgment and thought content normal. Cognition and memory are normal.   Nursing note and vitals reviewed.      Assessment:       1. SOB (shortness of breath)        Plan:         SOB (shortness of breath)  -     IN OFFICE EKG 12-LEAD (to [place])  -     XR CHEST PA AND LATERAL; Future; Expected date: 05/06/2019    Other orders  -     doxycycline (VIBRAMYCIN) 100 MG Cap; Take 1 capsule (100 mg total) by mouth 2 (two) times daily. for 10 days  Dispense: 20 capsule; Refill: 0       pt states that she cannot get her breath. Walked around house and tried going into rouses but became winded. Wanted to r/o  heart attack. Son currently causes stress but feels that his going to rehab today should make her more calm. NO cough. NO fever. No chest pain. Recently started methotrexate 1 month ago.  Pt states that she has an appt with her PCP kailee. Advised that cannot fully evaluated her concern of cardiac cause for SOB  and suggested that she seek higher level of care to fully evaluate her concerns. .  Has a sedentary lifestyle so PE cannot be excluded from phys exam. Pt v/u. Discussed at length re: sx and cxr findings. Pt to go to ED with any changes or concerns.     Xr Chest Pa And Lateral    Result Date: 5/6/2019  EXAMINATION: TWO VIEWS OF THE CHEST CLINICAL HISTORY: Shortness of breath TECHNIQUE: Two views of the chest. COMPARISON: 01/22/2010 FINDINGS: The cardiac silhouette is within normal limits.   There is no focal consolidation, pneumothorax, or pleural effusion. There is minimal left basilar atelectasis.     No focal consolidation.  Minimal left basilar atelectasis. Electronically signed by: Lizeth Varner Date:    05/06/2019 Time:    20:02

## 2021-10-04 ENCOUNTER — PATIENT MESSAGE (OUTPATIENT)
Dept: FAMILY MEDICINE | Facility: CLINIC | Age: 61
End: 2021-10-04

## 2021-10-22 ENCOUNTER — HOSPITAL ENCOUNTER (OUTPATIENT)
Dept: RADIOLOGY | Facility: HOSPITAL | Age: 61
Discharge: HOME | End: 2021-10-22
Attending: SPECIALIST
Payer: COMMERCIAL

## 2021-10-22 DIAGNOSIS — E55.9 VITAMIN D DEFICIENCY: ICD-10-CM

## 2021-10-22 DIAGNOSIS — Z13.820 ENCOUNTER FOR IMAGING TO ASSESS OSTEOPOROSIS CHANGE: ICD-10-CM

## 2021-10-22 DIAGNOSIS — N95.1 SYMPTOMATIC MENOPAUSAL OR FEMALE CLIMACTERIC STATES: ICD-10-CM

## 2021-10-22 DIAGNOSIS — Z78.0 ASYMPTOMATIC AGE-RELATED POSTMENOPAUSAL STATE: ICD-10-CM

## 2021-10-22 PROCEDURE — 77080 DXA BONE DENSITY AXIAL: CPT | Mod: 26,,, | Performed by: RADIOLOGY

## 2021-10-22 PROCEDURE — 77080 DXA BONE DENSITY AXIAL: CPT | Mod: TC,PO

## 2021-10-22 PROCEDURE — 77080 DEXA BONE DENSITY SPINE HIP: ICD-10-PCS | Mod: 26,,, | Performed by: RADIOLOGY

## 2021-11-23 ENCOUNTER — OFFICE VISIT (OUTPATIENT)
Dept: FAMILY MEDICINE | Facility: CLINIC | Age: 61
End: 2021-11-23
Payer: COMMERCIAL

## 2021-11-23 VITALS
DIASTOLIC BLOOD PRESSURE: 80 MMHG | WEIGHT: 254.88 LBS | HEART RATE: 61 BPM | BODY MASS INDEX: 36.49 KG/M2 | OXYGEN SATURATION: 95 % | SYSTOLIC BLOOD PRESSURE: 120 MMHG | HEIGHT: 70 IN

## 2021-11-23 DIAGNOSIS — M79.672 LEFT FOOT PAIN: Primary | ICD-10-CM

## 2021-11-23 PROCEDURE — 99999 PR PBB SHADOW E&M-EST. PATIENT-LVL IV: CPT | Mod: PBBFAC,,, | Performed by: INTERNAL MEDICINE

## 2021-11-23 PROCEDURE — 99214 OFFICE O/P EST MOD 30 MIN: CPT | Mod: S$GLB,,, | Performed by: INTERNAL MEDICINE

## 2021-11-23 PROCEDURE — 99999 PR PBB SHADOW E&M-EST. PATIENT-LVL IV: ICD-10-PCS | Mod: PBBFAC,,, | Performed by: INTERNAL MEDICINE

## 2021-11-23 PROCEDURE — 99214 PR OFFICE/OUTPT VISIT, EST, LEVL IV, 30-39 MIN: ICD-10-PCS | Mod: S$GLB,,, | Performed by: INTERNAL MEDICINE

## 2021-12-09 ENCOUNTER — LAB VISIT (OUTPATIENT)
Dept: LAB | Facility: HOSPITAL | Age: 61
End: 2021-12-09
Attending: INTERNAL MEDICINE
Payer: COMMERCIAL

## 2021-12-09 DIAGNOSIS — M79.672 LEFT FOOT PAIN: ICD-10-CM

## 2021-12-09 LAB
CRP SERPL-MCNC: 1.3 MG/L (ref 0–8.2)
URATE SERPL-MCNC: 6.8 MG/DL (ref 2.4–5.7)

## 2021-12-09 PROCEDURE — 86140 C-REACTIVE PROTEIN: CPT | Performed by: INTERNAL MEDICINE

## 2021-12-09 PROCEDURE — 84550 ASSAY OF BLOOD/URIC ACID: CPT | Performed by: INTERNAL MEDICINE

## 2021-12-09 PROCEDURE — 36415 COLL VENOUS BLD VENIPUNCTURE: CPT | Mod: PN | Performed by: INTERNAL MEDICINE

## 2022-01-28 ENCOUNTER — OFFICE VISIT (OUTPATIENT)
Dept: FAMILY MEDICINE | Facility: CLINIC | Age: 62
End: 2022-01-28
Payer: COMMERCIAL

## 2022-01-28 VITALS
DIASTOLIC BLOOD PRESSURE: 88 MMHG | BODY MASS INDEX: 36.6 KG/M2 | RESPIRATION RATE: 18 BRPM | WEIGHT: 255.63 LBS | SYSTOLIC BLOOD PRESSURE: 122 MMHG | OXYGEN SATURATION: 96 % | HEIGHT: 70 IN | HEART RATE: 61 BPM

## 2022-01-28 DIAGNOSIS — T14.8XXA MUSCLE STRAIN: ICD-10-CM

## 2022-01-28 DIAGNOSIS — M54.50 ACUTE MIDLINE LOW BACK PAIN WITHOUT SCIATICA: Primary | ICD-10-CM

## 2022-01-28 DIAGNOSIS — M62.838 MUSCLE SPASM: ICD-10-CM

## 2022-01-28 LAB
BILIRUB SERPL-MCNC: NEGATIVE MG/DL
BLOOD URINE, POC: NEGATIVE
CLARITY, POC UA: CLEAR
COLOR, POC UA: NORMAL
GLUCOSE UR QL STRIP: NORMAL
KETONES UR QL STRIP: NEGATIVE
LEUKOCYTE ESTERASE URINE, POC: NEGATIVE
NITRITE, POC UA: NEGATIVE
PH, POC UA: 5
PROTEIN, POC: NORMAL
SPECIFIC GRAVITY, POC UA: 1.02
UROBILINOGEN, POC UA: NORMAL

## 2022-01-28 PROCEDURE — 99213 PR OFFICE/OUTPT VISIT, EST, LEVL III, 20-29 MIN: ICD-10-PCS | Mod: S$GLB,,, | Performed by: INTERNAL MEDICINE

## 2022-01-28 PROCEDURE — 3079F DIAST BP 80-89 MM HG: CPT | Mod: CPTII,S$GLB,, | Performed by: INTERNAL MEDICINE

## 2022-01-28 PROCEDURE — 3074F PR MOST RECENT SYSTOLIC BLOOD PRESSURE < 130 MM HG: ICD-10-PCS | Mod: CPTII,S$GLB,, | Performed by: INTERNAL MEDICINE

## 2022-01-28 PROCEDURE — 3008F PR BODY MASS INDEX (BMI) DOCUMENTED: ICD-10-PCS | Mod: CPTII,S$GLB,, | Performed by: INTERNAL MEDICINE

## 2022-01-28 PROCEDURE — 3074F SYST BP LT 130 MM HG: CPT | Mod: CPTII,S$GLB,, | Performed by: INTERNAL MEDICINE

## 2022-01-28 PROCEDURE — 99213 OFFICE O/P EST LOW 20 MIN: CPT | Mod: S$GLB,,, | Performed by: INTERNAL MEDICINE

## 2022-01-28 PROCEDURE — 99999 PR PBB SHADOW E&M-EST. PATIENT-LVL V: ICD-10-PCS | Mod: PBBFAC,,, | Performed by: INTERNAL MEDICINE

## 2022-01-28 PROCEDURE — 3079F PR MOST RECENT DIASTOLIC BLOOD PRESSURE 80-89 MM HG: ICD-10-PCS | Mod: CPTII,S$GLB,, | Performed by: INTERNAL MEDICINE

## 2022-01-28 PROCEDURE — 99999 PR PBB SHADOW E&M-EST. PATIENT-LVL V: CPT | Mod: PBBFAC,,, | Performed by: INTERNAL MEDICINE

## 2022-01-28 PROCEDURE — 1159F PR MEDICATION LIST DOCUMENTED IN MEDICAL RECORD: ICD-10-PCS | Mod: CPTII,S$GLB,, | Performed by: INTERNAL MEDICINE

## 2022-01-28 PROCEDURE — 81002 URINALYSIS NONAUTO W/O SCOPE: CPT | Mod: S$GLB,,, | Performed by: INTERNAL MEDICINE

## 2022-01-28 PROCEDURE — 1159F MED LIST DOCD IN RCRD: CPT | Mod: CPTII,S$GLB,, | Performed by: INTERNAL MEDICINE

## 2022-01-28 PROCEDURE — 3008F BODY MASS INDEX DOCD: CPT | Mod: CPTII,S$GLB,, | Performed by: INTERNAL MEDICINE

## 2022-01-28 PROCEDURE — 81002 POCT URINE DIPSTICK WITHOUT MICROSCOPE: ICD-10-PCS | Mod: S$GLB,,, | Performed by: INTERNAL MEDICINE

## 2022-01-28 RX ORDER — CYCLOBENZAPRINE HCL 5 MG
TABLET ORAL
Qty: 30 TABLET | Refills: 1 | Status: SHIPPED | OUTPATIENT
Start: 2022-01-28 | End: 2022-03-03

## 2022-01-28 NOTE — PATIENT INSTRUCTIONS
"Acute midline low back pain without sciatica; tyllenol arthritis for pain otc as well. Cold applications for 48 hrs then thermal heat. [employer] "Bed Buddy" for thermal heat applications  -     POCT URINE DIPSTICK WITHOUT MICROSCOPE  -     cyclobenzaprine (FLEXERIL) 5 MG tablet; 5 mg po up to 3x a day as needed for muscle pain; please get generic  Dispense: 30 tablet; Refill: 1  -     Urinalysis Microscopic; Future; Expected date: 01/28/2022    Muscle strain  -     cyclobenzaprine (FLEXERIL) 5 MG tablet; 5 mg po up to 3x a day as needed for muscle pain; please get generic  Dispense: 30 tablet; Refill: 1    Muscle spasm  -     cyclobenzaprine (FLEXERIL) 5 MG tablet; 5 mg po up to 3x a day as needed for muscle pain; please get generic  Dispense: 30 tablet; Refill: 1            "

## 2022-01-28 NOTE — PROGRESS NOTES
Subjective:       Patient ID: Christin Caruso is a 62 y.o. female.    Chief Complaint: Back Pain (Dark urine)    HPI:  Seeing patient today for her PCP Dr. Judi Gonsalez  Patient here today in the office with complaint of lower back pain which is upper lumbar in location for 2 days now; no associated dysuria, fever or chills; no frequency noted but mild urine volume reduction noted but she is drinking less.  Urine is noted to be concentrated patient to try and increase her fluid intake during the day.  She has no injury or heavy lifting.  No back pain before this.  She has been moving some furniture around earlier.  Pain is worse with movement and better with sitting.  She is not taking any medications for relief.  Pain on 1-10 scale is ranked as 6.  Urinalysis dipstick in the office:  had a mildly elevated specific gravity at 1.020; leukocytes were negative; nitrites were negative; protein was trace; blood was negative.    Total time 5:05 p.m. through 5:37 p.m..  Greater than 50% of the time spent in discussion, counseling, and review; medications reviewed and prescribed as well.     Review of Systems   Constitutional: Negative for appetite change and fever.   HENT: Negative for congestion, postnasal drip, rhinorrhea and sinus pressure.    Eyes: Negative for discharge and itching.   Respiratory: Negative for cough, chest tightness, shortness of breath and wheezing.    Cardiovascular: Negative for chest pain, palpitations and leg swelling.   Gastrointestinal: Negative for abdominal distention, abdominal pain, blood in stool, constipation, diarrhea, nausea and vomiting.   Endocrine: Negative for polydipsia, polyphagia and polyuria.   Genitourinary: Negative for dysuria and hematuria.   Musculoskeletal: Negative for arthralgias and myalgias.        Lower back pain upper lumbar in location   Skin: Negative for rash.   Allergic/Immunologic: Negative for environmental allergies and food allergies.   Neurological: Negative  "for tremors, seizures and syncope.   Hematological: Negative for adenopathy. Does not bruise/bleed easily.   Psychiatric/Behavioral: Negative for dysphoric mood. The patient is not nervous/anxious.       Objective:        Vitals:    22 1552   BP: 122/88   BP Location: Right arm   Patient Position: Sitting   BP Method: Large (Manual)   Pulse: 61   Resp: 18   SpO2: 96%   Weight: 116 kg (255 lb 10.3 oz)   Height: 5' 10" (1.778 m)       BMI Readings from Last 3 Encounters:   22 36.68 kg/m²   21 36.57 kg/m²   10/28/21 36.73 kg/m²        Wt Readings from Last 3 Encounters:   22 1552 116 kg (255 lb 10.3 oz)   21 0810 115.6 kg (254 lb 13.6 oz)   10/28/21 1117 116.1 kg (256 lb)        BP Readings from Last 3 Encounters:   22 122/88   21 120/80   10/28/21 128/80        There are no preventive care reminders to display for this patient.     Health Maintenance Due   Topic Date Due    COVID-19 Vaccine (1) Never done    Aspirin/Antiplatelet Therapy  Never done    Shingles Vaccine (1 of 2) Never done    Influenza Vaccine (1) 2021         Past Medical History:   Diagnosis Date    Arthritis     Depression     Hypertension     Following with Dr. Rafa Mendes of left eye        Past Surgical History:   Procedure Laterality Date    BUNIONECTOMY      right foot     SECTION      x2    COLONOSCOPY N/A 2016    Procedure: COLONOSCOPY;  Surgeon: Aj Pruitt Jr., MD;  Location: [facility] ENDO;  Service: Endoscopy;  Laterality: N/A;    CORONARY ANGIOGRAPHY N/A 2019    Procedure: ANGIOGRAM, CORONARY ARTERY;  Surgeon: Melanie Valencia MD;  Location: [facility] CATH;  Service: Cardiology;  Laterality: N/A;    LEFT HEART CATHETERIZATION N/A 2019    Procedure: Left heart cath;  Surgeon: Melanie Valencia MD;  Location: [facility] CATH;  Service: Cardiology;  Laterality: N/A;    TONSILLECTOMY         Social History     Tobacco Use    Smoking status: Former Smoker     Packs/day: " 0.15     Years: 4.00     Pack years: 0.60     Types: Cigarettes     Quit date: 10/1/2012     Years since quittin.3    Smokeless tobacco: Never Used   Substance Use Topics    Alcohol use: Yes     Alcohol/week: 1.0 standard drink     Types: 1 Glasses of wine per week     Comment: socially    Drug use: No       Family History   Problem Relation Age of Onset    Heart disease Mother     Arthritis Mother     Cataracts Mother     Diabetes Father     Heart disease Father     Cataracts Father     Diabetes Sister     Strabismus Sister     Diabetes Brother     Heart disease Brother     Strabismus Brother     Diabetes Paternal Aunt     Heart disease Maternal Grandfather     Cancer Neg Hx     Amblyopia Neg Hx     Blindness Neg Hx     Glaucoma Neg Hx     Macular degeneration Neg Hx     Retinal detachment Neg Hx        Review of patient's allergies indicates:   Allergen Reactions    Maury Other (See Comments)     Tongue swells, angiodema       Current Outpatient Medications on File Prior to Visit   Medication Sig Dispense Refill    AA/prot/lysine/methio/vit C/B6 (A/G PRO ORAL) Take 2 tablets by mouth once daily.       atorvastatin (LIPITOR) 40 MG tablet Take 1 tablet (40 mg total) by mouth once daily. 90 tablet 3    elderberry fruit and flower 460-115 mg Cap Take by mouth.      metoprolol succinate (TOPROL-XL) 50 MG 24 hr tablet Take 1 tablet (50 mg total) by mouth once daily. 90 tablet 3    multivitamin (THERAGRAN) per tablet Take 1 tablet by mouth once daily.       omega-3 fatty acids/fish oil (FISH OIL-OMEGA-3 FATTY ACIDS) 300-1,000 mg capsule Take 4 capsules by mouth once daily.      SYNTHROID 112 mcg tablet TAKE 1 TABLET (112 MCG TOTAL) BY MOUTH BEFORE BREAKFAST. 90 tablet 3    TRINTELLIX 10 mg Tab Take 1 tablet by mouth once daily.      ubidecarenone/vitamin E mixed (COQ10  ORAL) Take by mouth once daily.      vitamin D 1000 units Tab Take 2,000 Units by mouth once daily.        zinc gluconate 50 mg tablet Take 50 mg by mouth once daily.      ascorbic acid, vitamin C, (VITAMIN C) 1000 MG tablet Take 1,000 mg by mouth once daily.      aspirin (ECOTRIN) 81 MG EC tablet Take 1 tablet (81 mg total) by mouth once daily. 90 tablet 3    LORazepam (ATIVAN) 0.5 MG tablet Take 1 tablet (0.5 mg total) by mouth every 6 (six) hours as needed for Anxiety. 20 tablet 0    venlafaxine (EFFEXOR-XR) 75 MG 24 hr capsule Take 75 mg by mouth once daily.       No current facility-administered medications on file prior to visit.       Physical Exam  Vitals reviewed.   Constitutional:       Appearance: She is well-developed and well-nourished.   HENT:      Head: Normocephalic and atraumatic.   Eyes:      Extraocular Movements: EOM normal.   Neck:      Thyroid: No thyromegaly.   Cardiovascular:      Rate and Rhythm: Normal rate and regular rhythm.      Heart sounds: Normal heart sounds. No murmur heard.  No gallop.    Pulmonary:      Effort: Pulmonary effort is normal. No respiratory distress.      Breath sounds: Normal breath sounds. No wheezing or rales.   Abdominal:      General: Bowel sounds are normal. There is no distension.      Palpations: Abdomen is soft.      Tenderness: There is no abdominal tenderness. There is no guarding or rebound.      Comments: BS+; nontender, nonpalp L/S. Soft; no posterior CVA tenderness over kidneys. No flank tenderness to palp.   Musculoskeletal:         General: No edema. Normal range of motion.      Cervical back: Normal range of motion and neck supple.      Comments: No tenderness to palp Lsp-Ssp. SLR neg; nl ROM hips; MS 5/5 to LE's; patellar DTR 2+ hieu legs. No hip tenderness to palp;    Lymphadenopathy:      Cervical: No cervical adenopathy.   Skin:     Findings: No rash.   Neurological:      Mental Status: She is alert and oriented to person, place, and time.      Comments: Moves all 4 extremities fine.   Psychiatric:         Mood and Affect: Mood and affect normal.    "      Behavior: Behavior normal.         Thought Content: Thought content normal.         Assessment:       1. Acute midline low back pain without sciatica    2. Muscle strain    3. Muscle spasm        Plan:       Acute midline low back pain without sciatica; upper lumbar in location; tylenol arthritis for pain otc as well. Cold applications for 48 hrs then thermal heat. [employer] "Bed Buddy" for thermal heat applications; recently moving some furniture.  Will also prescribe muscle relaxant generic Flexeril 5 mg up to t.i.d. as needed for muscle skeletal pain.  No strenuous activity or heavy lifting  -     POCT URINE DIPSTICK WITHOUT MICROSCOPE  -     cyclobenzaprine (FLEXERIL) 5 MG tablet; 5 mg po up to 3x a day as needed for muscle pain; please get generic  Dispense: 30 tablet; Refill: 1  -     Urinalysis Microscopic; Future; Expected date: 01/28/2022    Muscle strain:  Cold applications for 48 hours then thermal heat as needed; can also use Tylenol arthritis over-the-counter for pain as well.  Generic Flexeril 5 mg up to t.i.d. as needed for muscle skeletal pain has also been prescribed.  No strenuous activity or heavy lifting  -     cyclobenzaprine (FLEXERIL) 5 MG tablet; 5 mg po up to 3x a day as needed for muscle pain; please get generic  Dispense: 30 tablet; Refill: 1    Muscle spasm:  Cold applications for 48 hours then thermal heat as needed; can also use Tylenol arthritis over-the-counter for pain as well.  Generic Flexeril 5 mg up to t.i.d. as needed for muscle skeletal pain has also been prescribed.  No strenuous activity or heavy lifting.  -     cyclobenzaprine (FLEXERIL) 5 MG tablet; 5 mg po up to 3x a day as needed for muscle pain; please get generic  Dispense: 30 tablet; Refill: 1            "

## 2022-02-07 ENCOUNTER — OFFICE VISIT (OUTPATIENT)
Dept: RHEUMATOLOGY | Facility: CLINIC | Age: 62
End: 2022-02-07
Payer: COMMERCIAL

## 2022-02-07 VITALS
HEIGHT: 70 IN | WEIGHT: 251.75 LBS | SYSTOLIC BLOOD PRESSURE: 132 MMHG | HEART RATE: 61 BPM | DIASTOLIC BLOOD PRESSURE: 68 MMHG | BODY MASS INDEX: 36.04 KG/M2

## 2022-02-07 DIAGNOSIS — L40.9 PSORIASIS: ICD-10-CM

## 2022-02-07 DIAGNOSIS — L40.50 PSA (PSORIATIC ARTHRITIS): Primary | ICD-10-CM

## 2022-02-07 PROCEDURE — 1160F RVW MEDS BY RX/DR IN RCRD: CPT | Mod: CPTII,S$GLB,, | Performed by: INTERNAL MEDICINE

## 2022-02-07 PROCEDURE — 3008F BODY MASS INDEX DOCD: CPT | Mod: CPTII,S$GLB,, | Performed by: INTERNAL MEDICINE

## 2022-02-07 PROCEDURE — 3078F PR MOST RECENT DIASTOLIC BLOOD PRESSURE < 80 MM HG: ICD-10-PCS | Mod: CPTII,S$GLB,, | Performed by: INTERNAL MEDICINE

## 2022-02-07 PROCEDURE — 3075F PR MOST RECENT SYSTOLIC BLOOD PRESS GE 130-139MM HG: ICD-10-PCS | Mod: CPTII,S$GLB,, | Performed by: INTERNAL MEDICINE

## 2022-02-07 PROCEDURE — 99999 PR PBB SHADOW E&M-EST. PATIENT-LVL IV: ICD-10-PCS | Mod: PBBFAC,,, | Performed by: INTERNAL MEDICINE

## 2022-02-07 PROCEDURE — 1159F PR MEDICATION LIST DOCUMENTED IN MEDICAL RECORD: ICD-10-PCS | Mod: CPTII,S$GLB,, | Performed by: INTERNAL MEDICINE

## 2022-02-07 PROCEDURE — 3075F SYST BP GE 130 - 139MM HG: CPT | Mod: CPTII,S$GLB,, | Performed by: INTERNAL MEDICINE

## 2022-02-07 PROCEDURE — 1159F MED LIST DOCD IN RCRD: CPT | Mod: CPTII,S$GLB,, | Performed by: INTERNAL MEDICINE

## 2022-02-07 PROCEDURE — 99999 PR PBB SHADOW E&M-EST. PATIENT-LVL IV: CPT | Mod: PBBFAC,,, | Performed by: INTERNAL MEDICINE

## 2022-02-07 PROCEDURE — 1160F PR REVIEW ALL MEDS BY PRESCRIBER/CLIN PHARMACIST DOCUMENTED: ICD-10-PCS | Mod: CPTII,S$GLB,, | Performed by: INTERNAL MEDICINE

## 2022-02-07 PROCEDURE — 3008F PR BODY MASS INDEX (BMI) DOCUMENTED: ICD-10-PCS | Mod: CPTII,S$GLB,, | Performed by: INTERNAL MEDICINE

## 2022-02-07 PROCEDURE — 99214 PR OFFICE/OUTPT VISIT, EST, LEVL IV, 30-39 MIN: ICD-10-PCS | Mod: S$GLB,,, | Performed by: INTERNAL MEDICINE

## 2022-02-07 PROCEDURE — 3078F DIAST BP <80 MM HG: CPT | Mod: CPTII,S$GLB,, | Performed by: INTERNAL MEDICINE

## 2022-02-07 PROCEDURE — 99214 OFFICE O/P EST MOD 30 MIN: CPT | Mod: S$GLB,,, | Performed by: INTERNAL MEDICINE

## 2022-02-07 ASSESSMENT — ROUTINE ASSESSMENT OF PATIENT INDEX DATA (RAPID3)
MDHAQ FUNCTION SCORE: 0.1
FATIGUE SCORE: 0
PAIN SCORE: 3
PSYCHOLOGICAL DISTRESS SCORE: 0
TOTAL RAPID3 SCORE: 2.11
PATIENT GLOBAL ASSESSMENT SCORE: 3

## 2022-02-07 NOTE — PROGRESS NOTES
Subjective:          Chief Complaint: Christin Caruso is a 62 y.o. female who had concerns including Disease Management.    HPI:    Patient did try sulfasalzine noted discolored sweat. Not on for very long. Stopped SSZ.  Left foot 1/10 today, seen with PMD dept. Arch support. Has similar complaint intermittently with right foot.   Seen with podiatry but noting more edema end of day and pain that is diffuse through the midfoot,   Tylenol does help.     9/14/20:   Patient here for eval of PsA:  2019 new diagnosis for psoriatic arthritis started methotrexate unfortunately she was having increasing hair loss thusly discontinued 7/2019  Hair improved stopping MTX.   She tried leflunomide for only a few days but became fearful side effects  Offered sulfasalazine- ? Of sulfa allergy but she will check with dermatology about this (sounds like metrogel, not a sulfa. )  Hands still painful and stiff, noting new enlarging right 5th DIP/PIP hypertrophy.     Scalp with  psoriasis.  Mild singular plaque noted  She has some morning stiffness nothing that exceeds 20 min  +CAD with stents.     3/11/20: patient was concerned that Psoriasis on scalp was secondary to CPAP straps, trial with padding and all skin has cleared.   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 Some pain in top of foot wakes her only about 30% of time. No pain with walking.       MRI 03/13/2019 shows enhancement at the 1st metacarpal with synovial articulation joint effusion increased T2 signal and a sub subluxation versus ligamentous laxity she had some cortical erosions questioned but none overt found mostly degenerative changes noted at the 1st metacarpal and carpal articulations.  She did have fluid signal intensity seen in the flexor digitorum suggestive of a tenosynovitis and there is widening within the scapholunate ligament also suggestive of an inflammatory arthritis.    Prior Hx:   Patient is a 59-year-old  female self-referred for joint aches and pains has been seen by me in 2016 all attributed to osteoarthritis but she did have a low titer positive NIESHA 1:160 homogeneous negative NIESHA profile.  That time last seen she was doing well with aleve for joint aches and pains.  As of 2019 she was seen Dr. Zuniga on the [place] noticing that the last 6 months of 2018 she was having a increased and progressive arthralgia now involving the entire right hand left hand was now painful still having stiffness in the morning less than 5 min for back hip and legs.    She has more recently been diagnosed with by temporal scalp psoriasis with Dr. Norma Waldrop given topical solution.  Dr. Zuniga did have an MRI done which did show some inflammatory arthritis.  There was a question of whether starting Plaquenil would work but given the risk for flaring of the psoriasis methotrexate was preferred she start MTX.     She has been on methotrexate 10 mg weekly with marked improvement in her joints    Previous serologies NIESHA + 1:160 homogenous, RF negative, ESR/CRP wnl, +TPO Ab.    REVIEW OF SYSTEMS:    Review of Systems   Constitutional: Negative for fever, malaise/fatigue and weight loss.   HENT: Negative for sore throat.    Eyes: Negative for double vision, photophobia and redness.   Respiratory: Negative for cough, shortness of breath and wheezing.    Cardiovascular: Negative for chest pain, palpitations and orthopnea.   Gastrointestinal: Negative for abdominal pain, constipation and diarrhea.   Genitourinary: Negative for dysuria, hematuria and urgency.   Musculoskeletal: Positive for joint pain. Negative for back pain and myalgias.   Skin: Negative for rash.   Neurological: Negative for dizziness, tingling, focal weakness and headaches.   Endo/Heme/Allergies: Does not bruise/bleed easily.   Psychiatric/Behavioral: Negative for depression, hallucinations and suicidal ideas.               Objective:            Past Medical History:    Diagnosis Date    Arthritis     Depression     Hypertension     Following with Dr. Rafa Mendes of left eye      Family History   Problem Relation Age of Onset    Heart disease Mother     Arthritis Mother     Cataracts Mother     Diabetes Father     Heart disease Father     Cataracts Father     Diabetes Sister     Strabismus Sister     Diabetes Brother     Heart disease Brother     Strabismus Brother     Diabetes Paternal Aunt     Heart disease Maternal Grandfather     Cancer Neg Hx     Amblyopia Neg Hx     Blindness Neg Hx     Glaucoma Neg Hx     Macular degeneration Neg Hx     Retinal detachment Neg Hx      Social History     Tobacco Use    Smoking status: Former Smoker     Packs/day: 0.15     Years: 4.00     Pack years: 0.60     Types: Cigarettes     Quit date: 10/1/2012     Years since quittin.3    Smokeless tobacco: Never Used   Substance Use Topics    Alcohol use: Yes     Alcohol/week: 1.0 standard drink     Types: 1 Glasses of wine per week     Comment: socially    Drug use: No         Current Outpatient Medications on File Prior to Visit   Medication Sig Dispense Refill    AA/prot/lysine/methio/vit C/B6 (A/G PRO ORAL) Take 2 tablets by mouth once daily.       ascorbic acid, vitamin C, (VITAMIN C) 1000 MG tablet Take 1,000 mg by mouth once daily.      atorvastatin (LIPITOR) 40 MG tablet Take 1 tablet (40 mg total) by mouth once daily. 90 tablet 3    elderberry fruit and flower 460-115 mg Cap Take by mouth.      metoprolol succinate (TOPROL-XL) 50 MG 24 hr tablet Take 1 tablet (50 mg total) by mouth once daily. 90 tablet 3    multivitamin (THERAGRAN) per tablet Take 1 tablet by mouth once daily.       omega-3 fatty acids/fish oil (FISH OIL-OMEGA-3 FATTY ACIDS) 300-1,000 mg capsule Take 4 capsules by mouth once daily.      SYNTHROID 112 mcg tablet TAKE 1 TABLET (112 MCG TOTAL) BY MOUTH BEFORE BREAKFAST. 90 tablet 3    TRINTELLIX 10 mg Tab Take 1 tablet by mouth  once daily.      ubidecarenone/vitamin E mixed (COQ10  ORAL) Take by mouth once daily.      UNABLE TO FIND medication name: Jesica      vitamin D 1000 units Tab Take 2,000 Units by mouth once daily.       zinc gluconate 50 mg tablet Take 50 mg by mouth once daily.      aspirin (ECOTRIN) 81 MG EC tablet Take 1 tablet (81 mg total) by mouth once daily. 90 tablet 3    cyclobenzaprine (FLEXERIL) 5 MG tablet 5 mg po up to 3x a day as needed for muscle pain; please get generic (Patient not taking: Reported on 2/7/2022) 30 tablet 1    LORazepam (ATIVAN) 0.5 MG tablet Take 1 tablet (0.5 mg total) by mouth every 6 (six) hours as needed for Anxiety. 20 tablet 0    venlafaxine (EFFEXOR-XR) 75 MG 24 hr capsule Take 75 mg by mouth once daily.       No current facility-administered medications on file prior to visit.       Vitals:    02/07/22 1005   BP: 132/68   Pulse: 61       Physical Exam:    Physical Exam  Constitutional:       Appearance: Normal appearance. She is well-developed.   HENT:      Nose: No septal deviation.      Mouth/Throat:      Mouth: No oral lesions.   Eyes:      Conjunctiva/sclera:      Right eye: Right conjunctiva is not injected.      Left eye: Left conjunctiva is not injected.      Pupils: Pupils are equal, round, and reactive to light.   Neck:      Thyroid: No thyroid mass or thyromegaly.      Vascular: No JVD.   Cardiovascular:      Rate and Rhythm: Normal rate and regular rhythm.      Pulses: Normal pulses.      Comments: No edema  Pulmonary:      Effort: Pulmonary effort is normal.      Breath sounds: Normal breath sounds.   Abdominal:      Palpations: Abdomen is soft.   Musculoskeletal:      Right shoulder: No swelling or tenderness. Normal range of motion.      Left shoulder: No swelling or tenderness. Normal range of motion.      Right elbow: No swelling. Normal range of motion. No tenderness.      Left elbow: No swelling. Normal range of motion. No tenderness.      Right wrist:  Tenderness present. No swelling. Decreased range of motion.      Left wrist: Tenderness and bony tenderness present. No swelling. Decreased range of motion.      Right hip: Normal range of motion. Normal strength.      Left hip: No tenderness. Normal range of motion.      Right knee: No swelling. Normal range of motion. No tenderness.      Left knee: No swelling. Normal range of motion. No tenderness.      Right ankle: No swelling. No tenderness. Normal range of motion.      Left ankle: No swelling. No tenderness. Normal range of motion.      Comments: Right 3rd IP with subluxation and bony hypertrophy with loss of flexion. + large heberden nodes b/l. Right hand with more significant deformity. 1+ synovitis unalble to finger curl.  impaired.   Lymphadenopathy:      Cervical: No cervical adenopathy.   Skin:     General: Skin is dry.   Neurological:      Mental Status: She is alert and oriented to person, place, and time. Mental status is at baseline.               Assessment:       Encounter Diagnoses   Name Primary?    PSA (psoriatic arthritis) Yes    Psoriasis           Plan:          Very pleasant 63 y/o female with a prior polyarthritis, more recently dx with Ps.   2019 w/ Diagnosis psoriasis (resolved for few months now) and joints feeling markedly better.     Failed MTX-hair growing back  Attempted start leflunomide patient did not use for concern of ASE.   Having increased joints pains.    Off SSZ for sweat discoloration     Numbness hands/feet: no in any specific entrapment distribution   -reassurance continue to reposition pattern is still wandering.         3rd PIP that has loss of motion then we can monitor without DMARD therapy.  Discussed the risk of untreated psoriatic arthritis and inflammatory arthritis could be a progression and ultimate loss of joint function. ? Erosion/ with tenosynovitis. on her MRI and I think periodic and frequent monitoring of her function would be reasonable.         Follow up in about 6 months (around 8/7/2022).    30min consultation with greater than 50% spent in counseling, chart review and coordination of care. All questions answered.

## 2022-03-02 ENCOUNTER — TELEPHONE (OUTPATIENT)
Dept: FAMILY MEDICINE | Facility: CLINIC | Age: 62
End: 2022-03-02
Payer: COMMERCIAL

## 2022-03-02 NOTE — TELEPHONE ENCOUNTER
Spoke with pt.   States that last night her face felt numb, both sides of face are swollen. 1/2 of face is numb.  Advised ER due to symptoms. Pt verbalized understanding and agreed.

## 2022-03-02 NOTE — TELEPHONE ENCOUNTER
----- Message from Tanika Schofield sent at 3/2/2022 11:11 AM CST -----  Type:  Same Day Appointment Request    Caller is requesting a same day appointment.  Caller declined first available appointment listed below.      Name of Caller:  Pt  When is the first available appointment?  3/23  Symptoms:  Swollen Lymph Nodes/swelling and numbness in face  Best Call Back Number:  [phone]    Additional Information:   Pt requesting to be seen today  Please advise  Thanks

## 2022-03-03 ENCOUNTER — TELEPHONE (OUTPATIENT)
Dept: FAMILY MEDICINE | Facility: CLINIC | Age: 62
End: 2022-03-03
Payer: COMMERCIAL

## 2022-03-03 ENCOUNTER — OFFICE VISIT (OUTPATIENT)
Dept: FAMILY MEDICINE | Facility: CLINIC | Age: 62
End: 2022-03-03
Payer: COMMERCIAL

## 2022-03-03 ENCOUNTER — TELEPHONE (OUTPATIENT)
Dept: OTOLARYNGOLOGY | Facility: CLINIC | Age: 62
End: 2022-03-03
Payer: COMMERCIAL

## 2022-03-03 VITALS
BODY MASS INDEX: 36.28 KG/M2 | HEART RATE: 86 BPM | OXYGEN SATURATION: 97 % | SYSTOLIC BLOOD PRESSURE: 134 MMHG | HEIGHT: 70 IN | DIASTOLIC BLOOD PRESSURE: 84 MMHG | WEIGHT: 253.44 LBS | RESPIRATION RATE: 18 BRPM

## 2022-03-03 DIAGNOSIS — R59.0 SUBMANDIBULAR LYMPHADENOPATHY: Primary | ICD-10-CM

## 2022-03-03 LAB
CTP QC/QA: YES
S PYO RRNA THROAT QL PROBE: NEGATIVE

## 2022-03-03 PROCEDURE — 1160F PR REVIEW ALL MEDS BY PRESCRIBER/CLIN PHARMACIST DOCUMENTED: ICD-10-PCS | Mod: CPTII,S$GLB,, | Performed by: INTERNAL MEDICINE

## 2022-03-03 PROCEDURE — 3075F SYST BP GE 130 - 139MM HG: CPT | Mod: CPTII,S$GLB,, | Performed by: INTERNAL MEDICINE

## 2022-03-03 PROCEDURE — 87081 CULTURE SCREEN ONLY: CPT | Performed by: INTERNAL MEDICINE

## 2022-03-03 PROCEDURE — 1159F PR MEDICATION LIST DOCUMENTED IN MEDICAL RECORD: ICD-10-PCS | Mod: CPTII,S$GLB,, | Performed by: INTERNAL MEDICINE

## 2022-03-03 PROCEDURE — 3079F PR MOST RECENT DIASTOLIC BLOOD PRESSURE 80-89 MM HG: ICD-10-PCS | Mod: CPTII,S$GLB,, | Performed by: INTERNAL MEDICINE

## 2022-03-03 PROCEDURE — 87880 STREP A ASSAY W/OPTIC: CPT | Mod: QW,S$GLB,, | Performed by: INTERNAL MEDICINE

## 2022-03-03 PROCEDURE — 99999 PR PBB SHADOW E&M-EST. PATIENT-LVL V: ICD-10-PCS | Mod: PBBFAC,,, | Performed by: INTERNAL MEDICINE

## 2022-03-03 PROCEDURE — 99999 PR PBB SHADOW E&M-EST. PATIENT-LVL V: CPT | Mod: PBBFAC,,, | Performed by: INTERNAL MEDICINE

## 2022-03-03 PROCEDURE — 1160F RVW MEDS BY RX/DR IN RCRD: CPT | Mod: CPTII,S$GLB,, | Performed by: INTERNAL MEDICINE

## 2022-03-03 PROCEDURE — 3075F PR MOST RECENT SYSTOLIC BLOOD PRESS GE 130-139MM HG: ICD-10-PCS | Mod: CPTII,S$GLB,, | Performed by: INTERNAL MEDICINE

## 2022-03-03 PROCEDURE — 87880 POCT RAPID STREP A: ICD-10-PCS | Mod: QW,S$GLB,, | Performed by: INTERNAL MEDICINE

## 2022-03-03 PROCEDURE — 3008F PR BODY MASS INDEX (BMI) DOCUMENTED: ICD-10-PCS | Mod: CPTII,S$GLB,, | Performed by: INTERNAL MEDICINE

## 2022-03-03 PROCEDURE — 3008F BODY MASS INDEX DOCD: CPT | Mod: CPTII,S$GLB,, | Performed by: INTERNAL MEDICINE

## 2022-03-03 PROCEDURE — 3079F DIAST BP 80-89 MM HG: CPT | Mod: CPTII,S$GLB,, | Performed by: INTERNAL MEDICINE

## 2022-03-03 PROCEDURE — 99214 OFFICE O/P EST MOD 30 MIN: CPT | Mod: S$GLB,,, | Performed by: INTERNAL MEDICINE

## 2022-03-03 PROCEDURE — 1159F MED LIST DOCD IN RCRD: CPT | Mod: CPTII,S$GLB,, | Performed by: INTERNAL MEDICINE

## 2022-03-03 PROCEDURE — 99214 PR OFFICE/OUTPT VISIT, EST, LEVL IV, 30-39 MIN: ICD-10-PCS | Mod: S$GLB,,, | Performed by: INTERNAL MEDICINE

## 2022-03-03 NOTE — TELEPHONE ENCOUNTER
----- Message from Vani Queen sent at 3/3/2022 11:56 AM CST -----  Type:  Sooner Apoointment Request    Caller is requesting a sooner appointment.  Caller declined first available appointment listed below.  Caller will not accept being placed on the waitlist and is requesting a message be sent to doctor.    Name of Caller: Christin     When is the first available appointment? 03/09    Symptoms: swollen glands on neck     Best Call Back [phone]

## 2022-03-03 NOTE — TELEPHONE ENCOUNTER
Spoke with pt.   States that both sides of her neck/ glands are swollen. Went to ER 03/02/2022  Currently on prednisone.  Requesting appt with PCP for second opinion. Pt aware that PCP would not be able to offer further treatment. Please advise if able to work pt in tomorrow if she feels appt is necessary.

## 2022-03-03 NOTE — PROGRESS NOTES
Assessment and Plan:    1. Submandibular lymphadenopathy  Unclear cause. Obtain US ASAP. Obtain labs tomorrow.   - US Soft Tissue Head Neck Thyroid; Future  - CBC Auto Differential; Future  - Pathologist Interpretation Differential; Future  - POCT Rapid Strep A  - Obdulio-Barr Virus (EBV) Antibody Panel; Future  - Strep A culture, throat      ______________________________________________________________________  Subjective:    Chief Complaint:  ER follow up, swelling in neck    HPI:  Christin is a 62 y.o. year old female here for ER follow up and for swelling in neck.     She had been seen in ER yesterday for swelling in neck. ER note not available at this time. She had POCT mono test which was negative, and POCT CMP and CBC which were essentially normal. No imaging was done.     She reports today that the swelling began suddenly yesterday, both sides of her neck. This is tender. No other associated symptoms. Briefly had felt some numbness along her face, but thinks this was secondary to the swelling. She has not had any sore throat or fever along with this. No dental problems. No myalgia.      Medications:  Current Outpatient Medications on File Prior to Visit   Medication Sig Dispense Refill    AA/prot/lysine/methio/vit C/B6 (A/G PRO ORAL) Take 2 tablets by mouth once daily.       ascorbic acid, vitamin C, (VITAMIN C) 1000 MG tablet Take 1,000 mg by mouth once daily.      atorvastatin (LIPITOR) 40 MG tablet Take 1 tablet (40 mg total) by mouth once daily. 90 tablet 3    elderberry fruit and flower 460-115 mg Cap Take by mouth.      metoprolol succinate (TOPROL-XL) 50 MG 24 hr tablet Take 1 tablet (50 mg total) by mouth once daily. 90 tablet 3    multivitamin (THERAGRAN) per tablet Take 1 tablet by mouth once daily.       omega-3 fatty acids/fish oil (FISH OIL-OMEGA-3 FATTY ACIDS) 300-1,000 mg capsule Take 4 capsules by mouth once daily.      predniSONE (DELTASONE) 20 MG tablet Take 2 tablets (40 mg total)  by mouth once daily. for 3 days 6 tablet 0    SYNTHROID 112 mcg tablet TAKE 1 TABLET (112 MCG TOTAL) BY MOUTH BEFORE BREAKFAST. 90 tablet 3    TRINTELLIX 10 mg Tab Take 1 tablet by mouth once daily.      ubidecarenone/vitamin E mixed (COQ10  ORAL) Take by mouth once daily.      UNABLE TO FIND medication name: Jesica      vitamin D 1000 units Tab Take 2,000 Units by mouth once daily.       zinc gluconate 50 mg tablet Take 50 mg by mouth once daily.      aspirin (ECOTRIN) 81 MG EC tablet Take 1 tablet (81 mg total) by mouth once daily. 90 tablet 3    [DISCONTINUED] cyclobenzaprine (FLEXERIL) 5 MG tablet 5 mg po up to 3x a day as needed for muscle pain; please get generic (Patient not taking: No sig reported) 30 tablet 1    [DISCONTINUED] LORazepam (ATIVAN) 0.5 MG tablet Take 1 tablet (0.5 mg total) by mouth every 6 (six) hours as needed for Anxiety. 20 tablet 0    [DISCONTINUED] venlafaxine (EFFEXOR-XR) 75 MG 24 hr capsule Take 75 mg by mouth once daily.       Current Facility-Administered Medications on File Prior to Visit   Medication Dose Route Frequency Provider Last Rate Last Admin    [COMPLETED] sodium chloride 0.9% bolus 1,000 mL  1,000 mL Intravenous ED 1 Time Frederick Abdalla MD   Stopped at 03/02/22 9649       Review of Systems:  Review of Systems   Constitutional: Negative for chills and fever.   HENT: Positive for trouble swallowing. Negative for congestion, dental problem, ear pain, sinus pressure, sinus pain and sore throat.    Respiratory: Negative for cough, shortness of breath and wheezing.    Cardiovascular: Negative for chest pain and leg swelling.   Gastrointestinal: Negative for diarrhea, nausea and vomiting.   Musculoskeletal: Negative for myalgias.   Neurological: Negative for dizziness, syncope and light-headedness.   Hematological: Positive for adenopathy. Does not bruise/bleed easily.       Past Medical History:  Past Medical History:   Diagnosis Date    Arthritis      "Depression     Hypertension     Following with Dr. Rafa Mendes of left eye        Objective:    Vitals:  Vitals:    03/03/22 1639   BP: 134/84   Pulse: 86   Resp: 18   SpO2: 97%   Weight: 114.9 kg (253 lb 6.7 oz)   Height: 5' 10" (1.778 m)   PainSc: 0-No pain       Physical Exam  Vitals reviewed.   Constitutional:       General: She is not in acute distress.     Appearance: She is well-developed.   HENT:      Mouth/Throat:      Mouth: Mucous membranes are moist.      Dentition: Normal dentition.      Tongue: No lesions.      Pharynx: Oropharynx is clear. No posterior oropharyngeal erythema.      Tonsils: No tonsillar exudate.   Eyes:      General:         Right eye: No discharge.         Left eye: No discharge.      Conjunctiva/sclera: Conjunctivae normal.   Neck:     Cardiovascular:      Rate and Rhythm: Normal rate and regular rhythm.   Pulmonary:      Effort: Pulmonary effort is normal. No respiratory distress.   Lymphadenopathy:      Cervical: Cervical adenopathy present.   Skin:     General: Skin is warm and dry.   Neurological:      Mental Status: She is alert and oriented to person, place, and time.   Psychiatric:         Behavior: Behavior normal.         Thought Content: Thought content normal.         Judgment: Judgment normal.         Data:  POCT mono negative in ER    Judi Gonsalez MD  Internal Medicine    "

## 2022-03-03 NOTE — TELEPHONE ENCOUNTER
----- Message from Jason Ramon sent at 3/3/2022  7:12 AM CST -----  Regarding: sameday appointment  Contact: Patient  Type:  Same Day Appointment Request    Caller is requesting a same day appointment.  Caller declined first available appointment listed below.      Name of Caller:  Patient   When is the first available appointment?  March 23rd  Symptoms:  Throat closing up  Best Call Back Number:  [phone] (home)

## 2022-03-04 ENCOUNTER — LAB VISIT (OUTPATIENT)
Dept: LAB | Facility: HOSPITAL | Age: 62
End: 2022-03-04
Attending: INTERNAL MEDICINE
Payer: COMMERCIAL

## 2022-03-04 DIAGNOSIS — R59.0 SUBMANDIBULAR LYMPHADENOPATHY: ICD-10-CM

## 2022-03-04 LAB
BASOPHILS # BLD AUTO: 0.03 K/UL (ref 0–0.2)
BASOPHILS NFR BLD: 0.4 % (ref 0–1.9)
DIFFERENTIAL METHOD: ABNORMAL
EOSINOPHIL # BLD AUTO: 0.1 K/UL (ref 0–0.5)
EOSINOPHIL NFR BLD: 1.3 % (ref 0–8)
ERYTHROCYTE [DISTWIDTH] IN BLOOD BY AUTOMATED COUNT: 12.6 % (ref 11.5–14.5)
HCT VFR BLD AUTO: 43.2 % (ref 37–48.5)
HGB BLD-MCNC: 13.5 G/DL (ref 12–16)
IMM GRANULOCYTES # BLD AUTO: 0.03 K/UL (ref 0–0.04)
IMM GRANULOCYTES NFR BLD AUTO: 0.4 % (ref 0–0.5)
LYMPHOCYTES # BLD AUTO: 2.1 K/UL (ref 1–4.8)
LYMPHOCYTES NFR BLD: 29.1 % (ref 18–48)
MCH RBC QN AUTO: 29.6 PG (ref 27–31)
MCHC RBC AUTO-ENTMCNC: 31.3 G/DL (ref 32–36)
MCV RBC AUTO: 95 FL (ref 82–98)
MONOCYTES # BLD AUTO: 0.4 K/UL (ref 0.3–1)
MONOCYTES NFR BLD: 5.7 % (ref 4–15)
NEUTROPHILS # BLD AUTO: 4.5 K/UL (ref 1.8–7.7)
NEUTROPHILS NFR BLD: 63.1 % (ref 38–73)
NRBC BLD-RTO: 0 /100 WBC
PATH REV BLD -IMP: NORMAL
PLATELET # BLD AUTO: 240 K/UL (ref 150–450)
PMV BLD AUTO: 11.1 FL (ref 9.2–12.9)
RBC # BLD AUTO: 4.56 M/UL (ref 4–5.4)
WBC # BLD AUTO: 7.18 K/UL (ref 3.9–12.7)

## 2022-03-04 PROCEDURE — 85060 BLOOD SMEAR INTERPRETATION: CPT | Mod: ,,, | Performed by: PATHOLOGY

## 2022-03-04 PROCEDURE — 86665 EPSTEIN-BARR CAPSID VCA: CPT | Performed by: INTERNAL MEDICINE

## 2022-03-04 PROCEDURE — 85060 PATHOLOGIST REVIEW: ICD-10-PCS | Mod: ,,, | Performed by: PATHOLOGY

## 2022-03-04 PROCEDURE — 36415 COLL VENOUS BLD VENIPUNCTURE: CPT | Mod: PN | Performed by: INTERNAL MEDICINE

## 2022-03-04 PROCEDURE — 85025 COMPLETE CBC W/AUTO DIFF WBC: CPT | Performed by: INTERNAL MEDICINE

## 2022-03-07 ENCOUNTER — PATIENT MESSAGE (OUTPATIENT)
Dept: FAMILY MEDICINE | Facility: CLINIC | Age: 62
End: 2022-03-07
Payer: COMMERCIAL

## 2022-03-07 DIAGNOSIS — R59.0 SUBMANDIBULAR LYMPHADENOPATHY: Primary | ICD-10-CM

## 2022-03-07 LAB
BACTERIA THROAT CULT: NORMAL
EBV EA IGG SER-ACNC: 10.1 U/ML
EBV NA IGG SER-ACNC: 422 U/ML
EBV VCA IGG SER-ACNC: 143 U/ML
EBV VCA IGM SER-ACNC: <10 U/ML
PATH REV BLD -IMP: NORMAL

## 2022-03-08 RX ORDER — PREDNISONE 10 MG/1
TABLET ORAL
Qty: 31 TABLET | Refills: 0 | Status: SHIPPED | OUTPATIENT
Start: 2022-03-08 | End: 2022-04-28

## 2022-03-10 ENCOUNTER — PATIENT MESSAGE (OUTPATIENT)
Dept: RHEUMATOLOGY | Facility: CLINIC | Age: 62
End: 2022-03-10
Payer: COMMERCIAL

## 2022-03-23 ENCOUNTER — TELEPHONE (OUTPATIENT)
Dept: OTOLARYNGOLOGY | Facility: CLINIC | Age: 62
End: 2022-03-23
Payer: COMMERCIAL

## 2022-03-23 NOTE — TELEPHONE ENCOUNTER
"Contacted pt after receiving msg for call back. Pt was stating that she was seen by ENT a while back regarding her saliva glands. States that she has no saliva. I asked pt if she remembers which provider she saw regarding this issue, to which she states that she saw "Erik or someone else." I let pt know that there is no record of her being seen at our ENT office. Went over two previously scheduled ENT appts with both Reena Scales and Pelon Valencia. Pt answered that she cancelled both previous appts. Let her know that although she was never seen at our office, I can schedule an appt if she would like to proceed with being seen.     Pt stated that she will find out which provider she saw so she can f/u with them regarding her current issue.       ----- Message from Aston Irwin sent at 3/23/2022  3:00 PM CDT -----  Contact: Self  Type: Needs Medical Advice  Who Called:  Patient  Best Call Back Number: [phone]   Additional Information: Called to speak with office regarding saliva glands. States glands have gone down, but no saliva.           "

## 2022-05-09 ENCOUNTER — PATIENT MESSAGE (OUTPATIENT)
Dept: SMOKING CESSATION | Facility: CLINIC | Age: 62
End: 2022-05-09
Payer: COMMERCIAL

## 2022-06-05 RX ORDER — LEVOTHYROXINE SODIUM 112 UG/1
TABLET ORAL
Qty: 90 TABLET | Refills: 3 | OUTPATIENT
Start: 2022-06-05

## 2022-06-22 ENCOUNTER — LAB VISIT (OUTPATIENT)
Dept: LAB | Facility: HOSPITAL | Age: 62
End: 2022-06-22
Payer: COMMERCIAL

## 2022-06-22 DIAGNOSIS — E03.9 ACQUIRED HYPOTHYROIDISM: ICD-10-CM

## 2022-06-22 DIAGNOSIS — I25.84 CORONARY ARTERY DISEASE DUE TO CALCIFIED CORONARY LESION: ICD-10-CM

## 2022-06-22 DIAGNOSIS — I25.10 CORONARY ARTERY DISEASE DUE TO CALCIFIED CORONARY LESION: ICD-10-CM

## 2022-06-22 LAB
CHOLEST SERPL-MCNC: 151 MG/DL (ref 120–199)
CHOLEST/HDLC SERPL: 4.4 {RATIO} (ref 2–5)
HDLC SERPL-MCNC: 34 MG/DL (ref 40–75)
HDLC SERPL: 22.5 % (ref 20–50)
LDLC SERPL CALC-MCNC: 74.4 MG/DL (ref 63–159)
NONHDLC SERPL-MCNC: 117 MG/DL
T4 FREE SERPL-MCNC: 0.87 NG/DL (ref 0.71–1.51)
TRIGL SERPL-MCNC: 213 MG/DL (ref 30–150)
TSH SERPL DL<=0.005 MIU/L-ACNC: 4.64 UIU/ML (ref 0.4–4)

## 2022-06-22 PROCEDURE — 84439 ASSAY OF FREE THYROXINE: CPT | Performed by: NURSE PRACTITIONER

## 2022-06-22 PROCEDURE — 80061 LIPID PANEL: CPT | Performed by: NURSE PRACTITIONER

## 2022-06-22 PROCEDURE — 36415 COLL VENOUS BLD VENIPUNCTURE: CPT | Mod: PN | Performed by: NURSE PRACTITIONER

## 2022-06-22 PROCEDURE — 84443 ASSAY THYROID STIM HORMONE: CPT | Performed by: NURSE PRACTITIONER

## 2022-08-09 ENCOUNTER — SPECIALTY PHARMACY (OUTPATIENT)
Dept: PHARMACY | Facility: CLINIC | Age: 62
End: 2022-08-09
Payer: COMMERCIAL

## 2022-08-09 ENCOUNTER — LAB VISIT (OUTPATIENT)
Dept: LAB | Facility: HOSPITAL | Age: 62
End: 2022-08-09
Attending: INTERNAL MEDICINE
Payer: COMMERCIAL

## 2022-08-09 ENCOUNTER — OFFICE VISIT (OUTPATIENT)
Dept: RHEUMATOLOGY | Facility: CLINIC | Age: 62
End: 2022-08-09
Payer: COMMERCIAL

## 2022-08-09 VITALS
DIASTOLIC BLOOD PRESSURE: 77 MMHG | SYSTOLIC BLOOD PRESSURE: 150 MMHG | BODY MASS INDEX: 36.23 KG/M2 | HEART RATE: 58 BPM | WEIGHT: 253.06 LBS | HEIGHT: 70 IN

## 2022-08-09 DIAGNOSIS — L40.9 PSORIASIS: ICD-10-CM

## 2022-08-09 DIAGNOSIS — R53.81 MALAISE AND FATIGUE: ICD-10-CM

## 2022-08-09 DIAGNOSIS — L40.50 PSA (PSORIATIC ARTHRITIS): ICD-10-CM

## 2022-08-09 DIAGNOSIS — R53.83 MALAISE AND FATIGUE: ICD-10-CM

## 2022-08-09 DIAGNOSIS — R68.2 DRY MOUTH: ICD-10-CM

## 2022-08-09 DIAGNOSIS — L40.50 PSA (PSORIATIC ARTHRITIS): Primary | ICD-10-CM

## 2022-08-09 LAB
CRP SERPL-MCNC: 1 MG/L (ref 0–8.2)
ERYTHROCYTE [SEDIMENTATION RATE] IN BLOOD BY PHOTOMETRIC METHOD: 6 MM/HR (ref 0–36)

## 2022-08-09 PROCEDURE — 80074 ACUTE HEPATITIS PANEL: CPT | Performed by: INTERNAL MEDICINE

## 2022-08-09 PROCEDURE — 99999 PR PBB SHADOW E&M-EST. PATIENT-LVL IV: CPT | Mod: PBBFAC,,, | Performed by: INTERNAL MEDICINE

## 2022-08-09 PROCEDURE — 1159F MED LIST DOCD IN RCRD: CPT | Mod: CPTII,S$GLB,, | Performed by: INTERNAL MEDICINE

## 2022-08-09 PROCEDURE — 3078F PR MOST RECENT DIASTOLIC BLOOD PRESSURE < 80 MM HG: ICD-10-PCS | Mod: CPTII,S$GLB,, | Performed by: INTERNAL MEDICINE

## 2022-08-09 PROCEDURE — 3077F SYST BP >= 140 MM HG: CPT | Mod: CPTII,S$GLB,, | Performed by: INTERNAL MEDICINE

## 2022-08-09 PROCEDURE — 99999 PR PBB SHADOW E&M-EST. PATIENT-LVL IV: ICD-10-PCS | Mod: PBBFAC,,, | Performed by: INTERNAL MEDICINE

## 2022-08-09 PROCEDURE — 86039 ANTINUCLEAR ANTIBODIES (ANA): CPT | Performed by: INTERNAL MEDICINE

## 2022-08-09 PROCEDURE — 3008F BODY MASS INDEX DOCD: CPT | Mod: CPTII,S$GLB,, | Performed by: INTERNAL MEDICINE

## 2022-08-09 PROCEDURE — 3078F DIAST BP <80 MM HG: CPT | Mod: CPTII,S$GLB,, | Performed by: INTERNAL MEDICINE

## 2022-08-09 PROCEDURE — 85652 RBC SED RATE AUTOMATED: CPT | Performed by: INTERNAL MEDICINE

## 2022-08-09 PROCEDURE — 3077F PR MOST RECENT SYSTOLIC BLOOD PRESSURE >= 140 MM HG: ICD-10-PCS | Mod: CPTII,S$GLB,, | Performed by: INTERNAL MEDICINE

## 2022-08-09 PROCEDURE — 1159F PR MEDICATION LIST DOCUMENTED IN MEDICAL RECORD: ICD-10-PCS | Mod: CPTII,S$GLB,, | Performed by: INTERNAL MEDICINE

## 2022-08-09 PROCEDURE — 99215 OFFICE O/P EST HI 40 MIN: CPT | Mod: S$GLB,,, | Performed by: INTERNAL MEDICINE

## 2022-08-09 PROCEDURE — 99215 PR OFFICE/OUTPT VISIT, EST, LEVL V, 40-54 MIN: ICD-10-PCS | Mod: S$GLB,,, | Performed by: INTERNAL MEDICINE

## 2022-08-09 PROCEDURE — 86235 NUCLEAR ANTIGEN ANTIBODY: CPT | Mod: 59 | Performed by: INTERNAL MEDICINE

## 2022-08-09 PROCEDURE — 3008F PR BODY MASS INDEX (BMI) DOCUMENTED: ICD-10-PCS | Mod: CPTII,S$GLB,, | Performed by: INTERNAL MEDICINE

## 2022-08-09 PROCEDURE — 86140 C-REACTIVE PROTEIN: CPT | Performed by: INTERNAL MEDICINE

## 2022-08-09 PROCEDURE — 36415 COLL VENOUS BLD VENIPUNCTURE: CPT | Mod: PO | Performed by: INTERNAL MEDICINE

## 2022-08-09 PROCEDURE — 86038 ANTINUCLEAR ANTIBODIES: CPT | Performed by: INTERNAL MEDICINE

## 2022-08-09 RX ORDER — APREMILAST 30 MG/1
30 TABLET, FILM COATED ORAL 2 TIMES DAILY
Qty: 60 TABLET | Refills: 11 | Status: SHIPPED | OUTPATIENT
Start: 2022-08-09 | End: 2023-01-27

## 2022-08-09 ASSESSMENT — ROUTINE ASSESSMENT OF PATIENT INDEX DATA (RAPID3)
MDHAQ FUNCTION SCORE: 0.2
PSYCHOLOGICAL DISTRESS SCORE: 0
PATIENT GLOBAL ASSESSMENT SCORE: 6.5
TOTAL RAPID3 SCORE: 4.39
PAIN SCORE: 6
FATIGUE SCORE: 0

## 2022-08-09 NOTE — TELEPHONE ENCOUNTER
Hello, this is Precious Alanis with [facility].  We are working on your prescription that your doctor has sent us. We will be working with your insurance to get this approved for you. We will be calling you along the way with updates on your medication.  If you have any questions, you can reach us at [phone].    Welcome call outcome: Patient/caregiver reached   Must fill at [facility] once PA approved [phone]    Message sent to MD regarding order for Otezla starter.

## 2022-08-09 NOTE — PROGRESS NOTES
Subjective:          Chief Complaint: Christin Caruso is a 62 y.o. female who had concerns including Disease Management.    HPI: Psoriasis, inflammatory arthritis with MRI + erosion,  and +NIESHA  Previous serologies NIESHA + 1:160 homogenous, RF negative, ESR/CRP wnl, +TPO Ab.  She has had a chronic left foot pain uses arch support she had a similar pattern in the right foot.  Predominantly through the arch and midfoot that she experiences discomfort. Over the last few months the swelling in the left foot has markedly improved.     She is noting more stiffness in hands (fingers), joints are waking her up night.   She is having night and AM hand stiffness. Not over 1 hour.     Previous medication;  MTX with hair lost  SSZ with discoloration sweat    Patient eleveted to hold off on further DMARD/Biologic.     No prior hx of sicca.       2/2022  Patient did try sulfasalzine noted discolored sweat. Not on for very long. Stopped SSZ.  Left foot 1/10 today, seen with PMD dept. Arch support. Has similar complaint intermittently with right foot.   Seen with podiatry but noting more edema end of day and pain that is diffuse through the midfoot,   Tylenol does help.     9/14/20:   Patient here for eval of PsA:  2019 new diagnosis for psoriatic arthritis started methotrexate unfortunately she was having increasing hair loss thusly discontinued 7/2019  Hair improved stopping MTX.   She tried leflunomide for only a few days but became fearful side effects  Offered sulfasalazine- ? Of sulfa allergy but she will check with dermatology about this (sounds like metrogel, not a sulfa. )  Hands still painful and stiff, noting new enlarging right 5th DIP/PIP hypertrophy.       She has some morning stiffness nothing that exceeds 20 min  +CAD with stents.     3/11/20: patient was concerned that Psoriasis on scalp was secondary to CPAP straps, trial with padding and all skin has cleared.   Right 3rd PIP deformity (just realized sister has exact  deformity) no other dactylitis. Some pain in top of foot wakes her only about 30% of time. No pain with walking.       MRI 03/13/2019 shows enhancement at the 1st metacarpal with synovial articulation joint effusion increased T2 signal and a sub subluxation versus ligamentous laxity she had some cortical erosions questioned but none overt found mostly degenerative changes noted at the 1st metacarpal and carpal articulations.  She did have fluid signal intensity seen in the flexor digitorum suggestive of a tenosynovitis and there is widening within the scapholunate ligament also suggestive of an inflammatory arthritis.    Prior Hx:   Patient is a 59-year-old female self-referred for joint aches and pains has been seen by me in 2016 all attributed to osteoarthritis but she did have a low titer positive NIESHA 1:160 homogeneous negative NIESHA profile.  That time last seen she was doing well with aleve for joint aches and pains.  As of 2019 she was seen Dr. Zuniga on the [place] noticing that the last 6 months of 2018 she was having a increased and progressive arthralgia now involving the entire right hand left hand was now painful still having stiffness in the morning less than 5 min for back hip and legs.    She has more recently been diagnosed with by temporal scalp psoriasis with Dr. Norma Waldrop given topical solution.  Dr. Znuiga did have an MRI done which did show some inflammatory arthritis.  There was a question of whether starting Plaquenil would work but given the risk for flaring of the psoriasis methotrexate was preferred .    She has been on methotrexate 10 mg weekly with marked improvement in her joints        REVIEW OF SYSTEMS:    Review of Systems   Constitutional: Negative for fever, malaise/fatigue and weight loss.   HENT: Negative for sore throat.    Eyes: Negative for double vision, photophobia and redness.   Respiratory: Negative for cough, shortness of breath and wheezing.    Cardiovascular: Negative  for chest pain, palpitations and orthopnea.   Gastrointestinal: Negative for abdominal pain, constipation and diarrhea.   Genitourinary: Negative for dysuria, hematuria and urgency.   Musculoskeletal: Positive for joint pain. Negative for back pain and myalgias.   Skin: Negative for rash.   Neurological: Negative for dizziness, tingling, focal weakness and headaches.   Endo/Heme/Allergies: Does not bruise/bleed easily.   Psychiatric/Behavioral: Negative for depression, hallucinations and suicidal ideas.               Objective:            Past Medical History:   Diagnosis Date    Arthritis     Depression     Hypertension     Following with Dr. Rafa Mendes of left eye      Family History   Problem Relation Age of Onset    Heart disease Mother     Arthritis Mother     Cataracts Mother     Diabetes Father     Heart disease Father     Cataracts Father     Diabetes Sister     Strabismus Sister     Diabetes Brother     Heart disease Brother     Strabismus Brother     Diabetes Paternal Aunt     Heart disease Maternal Grandfather     Cancer Neg Hx     Amblyopia Neg Hx     Blindness Neg Hx     Glaucoma Neg Hx     Macular degeneration Neg Hx     Retinal detachment Neg Hx      Social History     Tobacco Use    Smoking status: Former Smoker     Packs/day: 0.15     Years: 4.00     Pack years: 0.60     Types: Cigarettes     Quit date: 10/1/2012     Years since quittin.8    Smokeless tobacco: Never Used   Substance Use Topics    Alcohol use: Yes     Alcohol/week: 1.0 standard drink     Types: 1 Glasses of wine per week     Comment: socially    Drug use: No         Current Outpatient Medications on File Prior to Visit   Medication Sig Dispense Refill    AA/prot/lysine/methio/vit C/B6 (A/G PRO ORAL) Take 2 tablets by mouth once daily.       ascorbic acid, vitamin C, (VITAMIN C) 1000 MG tablet Take 1,000 mg by mouth once daily.      atorvastatin (LIPITOR) 40 MG tablet Take 1 tablet (40 mg  total) by mouth once daily. 90 tablet 3    elderberry fruit and flower 460-115 mg Cap Take by mouth.      metoprolol succinate (TOPROL-XL) 50 MG 24 hr tablet Take 1 tablet (50 mg total) by mouth once daily. 90 tablet 3    multivitamin (THERAGRAN) per tablet Take 1 tablet by mouth once daily.       omega-3 fatty acids/fish oil (FISH OIL-OMEGA-3 FATTY ACIDS) 300-1,000 mg capsule Take 4 capsules by mouth once daily.      SYNTHROID 112 mcg tablet TAKE 1 TABLET (112 MCG TOTAL) BY MOUTH BEFORE BREAKFAST. 90 tablet 3    TRINTELLIX 10 mg Tab Take 1 tablet by mouth once daily.      ubidecarenone/vitamin E mixed (COQ10  ORAL) Take by mouth once daily.      vitamin D 1000 units Tab Take 2,000 Units by mouth once daily.       zinc gluconate 50 mg tablet Take 50 mg by mouth once daily.      aspirin (ECOTRIN) 81 MG EC tablet Take 1 tablet (81 mg total) by mouth once daily. 90 tablet 3    UNABLE TO FIND medication name: Jesica      UNABLE TO FIND once. Testosterone and estrogen cream       No current facility-administered medications on file prior to visit.       Vitals:    08/09/22 1015   BP: (!) 150/77   Pulse: (!) 58       Physical Exam:    Physical Exam  Constitutional:       Appearance: Normal appearance. She is well-developed.   HENT:      Nose: No septal deviation.      Mouth/Throat:      Mouth: No oral lesions.   Eyes:      Conjunctiva/sclera:      Right eye: Right conjunctiva is not injected.      Left eye: Left conjunctiva is not injected.      Pupils: Pupils are equal, round, and reactive to light.   Neck:      Thyroid: No thyroid mass or thyromegaly.      Vascular: No JVD.   Cardiovascular:      Rate and Rhythm: Normal rate and regular rhythm.      Pulses: Normal pulses.      Comments: No edema  Pulmonary:      Effort: Pulmonary effort is normal.      Breath sounds: Normal breath sounds.   Abdominal:      Palpations: Abdomen is soft.   Musculoskeletal:      Right shoulder: No swelling or tenderness.  Normal range of motion.      Left shoulder: No swelling or tenderness. Normal range of motion.      Right elbow: No swelling. Normal range of motion. No tenderness.      Left elbow: No swelling. Normal range of motion. No tenderness.      Right wrist: Tenderness present. No swelling. Decreased range of motion.      Left wrist: Tenderness and bony tenderness present. No swelling. Decreased range of motion.      Right hip: Normal range of motion. Normal strength.      Left hip: No tenderness. Normal range of motion.      Right knee: No swelling. Normal range of motion. No tenderness.      Left knee: No swelling. Normal range of motion. No tenderness.      Right ankle: No swelling. No tenderness. Normal range of motion.      Left ankle: No swelling. No tenderness. Normal range of motion.      Comments: Right 3rd IP with subluxation and bony hypertrophy with loss of flexion. + large heberden nodes b/l. Right hand with more significant deformity. 1+ synovitis unalble to finger curl.  impaired.   Lymphadenopathy:      Cervical: No cervical adenopathy.   Skin:     General: Skin is dry.   Neurological:      Mental Status: She is alert and oriented to person, place, and time. Mental status is at baseline.       Tender (FOSS-28): 12 / 28    Swollen (FOSS-28): 2 / 28           Assessment:       Encounter Diagnoses   Name Primary?    PSA (psoriatic arthritis) Yes    Psoriasis     Malaise and fatigue     Dry mouth           Plan:        PSA (psoriatic arthritis)  -     Sedimentation rate; Future; Expected date: 08/09/2022  -     C-Reactive Protein; Future; Expected date: 08/09/2022  -     Hepatitis Panel, Acute; Future; Expected date: 08/09/2022  -     Quantiferon Gold TB; Future; Expected date: 08/09/2022    Psoriasis  -     apremilast (OTEZLA) 30 mg Tab; Take 1 tablet (30 mg total) by mouth 2 (two) times daily.  Dispense: 60 tablet; Refill: 11  -     Sedimentation rate; Future; Expected date: 08/09/2022  -     C-Reactive  Protein; Future; Expected date: 08/09/2022  -     Hepatitis Panel, Acute; Future; Expected date: 08/09/2022  -     Quantiferon Gold TB; Future; Expected date: 08/09/2022    Malaise and fatigue  -     Hepatitis Panel, Acute; Future; Expected date: 08/09/2022  -     Quantiferon Gold TB; Future; Expected date: 08/09/2022    Dry mouth  -     NIESHA Screen w/Reflex; Future; Expected date: 08/09/2022  -     Sjogrens syndrome-A extractable nuclear antibody; Future; Expected date: 08/09/2022  -     Sjogrens syndrome-B extractable nuclear antibody; Future; Expected date: 08/09/2022        Very pleasant 62  Y/owith erosive (MRI) likely Psoriatic Arthritis   Historical + NIESHA, neg RF, neg CCP  2019 w/ dx w  Psoriasis- small scale temples today  Recent parotitis 3/2022 with dryness of mouth ? 2nd sjogrens vs viral LAD.     Progression of joints and skin over last 6 months   Failed MTX-hair growing back  Attempted start leflunomide patient did not use for concern of ASE.    SSZ for sweat discoloration     Discussed the risk of untreated psoriatic arthritis and inflammatory arthritis could be a progression and ultimate loss of joint function. ? Erosion/ with tenosynovitis. on her MRI and I think periodic and frequent monitoring of her function would be reasonable.         Follow up in about 4 months (around 12/9/2022).    40min consultation with greater than 50% spent in counseling, chart review and coordination of care. All questions answered.

## 2022-08-10 ENCOUNTER — TELEPHONE (OUTPATIENT)
Dept: RHEUMATOLOGY | Facility: CLINIC | Age: 62
End: 2022-08-10

## 2022-08-10 DIAGNOSIS — Z12.31 OTHER SCREENING MAMMOGRAM: ICD-10-CM

## 2022-08-10 DIAGNOSIS — L40.50 PSA (PSORIATIC ARTHRITIS): Primary | ICD-10-CM

## 2022-08-10 LAB
ANA PATTERN 1: NORMAL
ANA SER QL IF: POSITIVE
ANA TITR SER IF: NORMAL {TITER}
HAV IGM SERPL QL IA: NEGATIVE
HBV CORE IGM SERPL QL IA: NEGATIVE
HBV SURFACE AG SERPL QL IA: NEGATIVE
HCV AB SERPL QL IA: NEGATIVE

## 2022-08-10 RX ORDER — APREMILAST 10-20-30MG
KIT ORAL
Qty: 55 TABLET | Refills: 0 | Status: SHIPPED | OUTPATIENT
Start: 2022-08-10 | End: 2022-10-26

## 2022-08-10 NOTE — TELEPHONE ENCOUNTER
Lesley MCGLIL submittied.  [facility] Key: [id number]    Lesley MCGILL approved from 8/10/22 and 8/10/23.  Case ID: [id number]    Awaiting order for Otezla starter pack.

## 2022-08-11 ENCOUNTER — PATIENT MESSAGE (OUTPATIENT)
Dept: PHARMACY | Facility: CLINIC | Age: 62
End: 2022-08-11
Payer: COMMERCIAL

## 2022-08-11 LAB
ANTI SM ANTIBODY: 0.07 RATIO (ref 0–0.99)
ANTI SM/RNP ANTIBODY: 0.09 RATIO (ref 0–0.99)
ANTI-SM INTERPRETATION: NEGATIVE
ANTI-SM/RNP INTERPRETATION: NEGATIVE
ANTI-SSA ANTIBODY: 0.05 RATIO (ref 0–0.99)
ANTI-SSA ANTIBODY: 0.05 RATIO (ref 0–0.99)
ANTI-SSA INTERPRETATION: NEGATIVE
ANTI-SSA INTERPRETATION: NEGATIVE
ANTI-SSB ANTIBODY: 0.05 RATIO (ref 0–0.99)
ANTI-SSB ANTIBODY: 0.05 RATIO (ref 0–0.99)
ANTI-SSB INTERPRETATION: NEGATIVE
ANTI-SSB INTERPRETATION: NEGATIVE
DSDNA AB SER-ACNC: NORMAL [IU]/ML

## 2022-08-11 NOTE — TELEPHONE ENCOUNTER
Benefits Investigation - Otezla    Insurance name: [employer]  Rep name: Gretta    Copay amount: $80  Deductible: $0  OOPmax: $5000  OSP in network? No  Tier level (if applicable): N/A    Must fill at [facility]

## 2022-08-11 NOTE — TELEPHONE ENCOUNTER
Outgoing call to notify patient of Otezla approval, transfer to [facility], and obtain permission for Otezla copay card.    Patient verbalized understanding. [employer] message sent with copay card information and [employer] phone number.

## 2022-11-02 ENCOUNTER — HOSPITAL ENCOUNTER (OUTPATIENT)
Dept: RADIOLOGY | Facility: HOSPITAL | Age: 62
Discharge: HOME | End: 2022-11-02
Attending: INTERNAL MEDICINE
Payer: COMMERCIAL

## 2022-11-02 DIAGNOSIS — Z12.31 OTHER SCREENING MAMMOGRAM: ICD-10-CM

## 2022-11-02 PROCEDURE — 77067 SCR MAMMO BI INCL CAD: CPT | Mod: 26,,, | Performed by: RADIOLOGY

## 2022-11-02 PROCEDURE — 77067 SCR MAMMO BI INCL CAD: CPT | Mod: TC,PO

## 2022-11-02 PROCEDURE — 77063 MAMMO DIGITAL SCREENING BILAT WITH TOMO: ICD-10-PCS | Mod: 26,,, | Performed by: RADIOLOGY

## 2022-11-02 PROCEDURE — 77067 MAMMO DIGITAL SCREENING BILAT WITH TOMO: ICD-10-PCS | Mod: 26,,, | Performed by: RADIOLOGY

## 2022-11-02 PROCEDURE — 77063 BREAST TOMOSYNTHESIS BI: CPT | Mod: 26,,, | Performed by: RADIOLOGY

## 2022-11-29 ENCOUNTER — LAB VISIT (OUTPATIENT)
Dept: LAB | Facility: HOSPITAL | Age: 62
End: 2022-11-29
Attending: INTERNAL MEDICINE
Payer: COMMERCIAL

## 2022-11-29 DIAGNOSIS — I10 ESSENTIAL HYPERTENSION: ICD-10-CM

## 2022-11-29 LAB
ALBUMIN SERPL BCP-MCNC: 3.7 G/DL (ref 3.5–5.2)
ALP SERPL-CCNC: 72 U/L (ref 55–135)
ALT SERPL W/O P-5'-P-CCNC: 36 U/L (ref 10–44)
ANION GAP SERPL CALC-SCNC: 8 MMOL/L (ref 8–16)
AST SERPL-CCNC: 31 U/L (ref 10–40)
BILIRUB SERPL-MCNC: 1.4 MG/DL (ref 0.1–1)
BUN SERPL-MCNC: 12 MG/DL (ref 8–23)
CALCIUM SERPL-MCNC: 8.6 MG/DL (ref 8.7–10.5)
CHLORIDE SERPL-SCNC: 107 MMOL/L (ref 95–110)
CO2 SERPL-SCNC: 29 MMOL/L (ref 23–29)
CREAT SERPL-MCNC: 0.7 MG/DL (ref 0.5–1.4)
EST. GFR  (NO RACE VARIABLE): >60 ML/MIN/1.73 M^2
GLUCOSE SERPL-MCNC: 98 MG/DL (ref 70–110)
POTASSIUM SERPL-SCNC: 4 MMOL/L (ref 3.5–5.1)
PROT SERPL-MCNC: 6.3 G/DL (ref 6–8.4)
SODIUM SERPL-SCNC: 144 MMOL/L (ref 136–145)

## 2022-11-29 PROCEDURE — 36415 COLL VENOUS BLD VENIPUNCTURE: CPT | Mod: PN | Performed by: INTERNAL MEDICINE

## 2022-11-29 PROCEDURE — 80053 COMPREHEN METABOLIC PANEL: CPT | Performed by: INTERNAL MEDICINE

## 2022-12-05 ENCOUNTER — PATIENT MESSAGE (OUTPATIENT)
Dept: FAMILY MEDICINE | Facility: CLINIC | Age: 62
End: 2022-12-05
Payer: COMMERCIAL

## 2022-12-05 DIAGNOSIS — R17 ELEVATED BILIRUBIN: Primary | ICD-10-CM

## 2022-12-06 NOTE — TELEPHONE ENCOUNTER
Please review abd US in 2018. Pt will be sched appt w/ you for f/u. Would you like to order repeat US prior to her appt?

## 2022-12-14 ENCOUNTER — HOSPITAL ENCOUNTER (OUTPATIENT)
Dept: RADIOLOGY | Facility: HOSPITAL | Age: 62
Discharge: HOME | End: 2022-12-14
Attending: INTERNAL MEDICINE
Payer: COMMERCIAL

## 2022-12-14 DIAGNOSIS — R17 ELEVATED BILIRUBIN: ICD-10-CM

## 2022-12-14 PROCEDURE — 76705 ECHO EXAM OF ABDOMEN: CPT | Mod: TC,PO

## 2022-12-14 PROCEDURE — 76705 ECHO EXAM OF ABDOMEN: CPT | Mod: 26,,, | Performed by: RADIOLOGY

## 2022-12-14 PROCEDURE — 76705 US ABDOMEN LIMITED: ICD-10-PCS | Mod: 26,,, | Performed by: RADIOLOGY

## 2022-12-22 ENCOUNTER — OFFICE VISIT (OUTPATIENT)
Dept: FAMILY MEDICINE | Facility: CLINIC | Age: 62
End: 2022-12-22
Payer: COMMERCIAL

## 2022-12-22 VITALS
DIASTOLIC BLOOD PRESSURE: 88 MMHG | OXYGEN SATURATION: 97 % | HEART RATE: 66 BPM | RESPIRATION RATE: 18 BRPM | WEIGHT: 261.13 LBS | BODY MASS INDEX: 37.38 KG/M2 | HEIGHT: 70 IN | SYSTOLIC BLOOD PRESSURE: 138 MMHG

## 2022-12-22 DIAGNOSIS — I10 ESSENTIAL HYPERTENSION: Primary | ICD-10-CM

## 2022-12-22 DIAGNOSIS — F32.0 MILD MAJOR DEPRESSION: ICD-10-CM

## 2022-12-22 DIAGNOSIS — E66.01 MORBID OBESITY: ICD-10-CM

## 2022-12-22 DIAGNOSIS — E03.9 HYPOTHYROIDISM, UNSPECIFIED TYPE: ICD-10-CM

## 2022-12-22 PROBLEM — D84.821 DRUG-INDUCED IMMUNODEFICIENCY: Status: ACTIVE | Noted: 2022-12-22

## 2022-12-22 PROBLEM — Z79.899 DRUG-INDUCED IMMUNODEFICIENCY: Status: ACTIVE | Noted: 2022-12-22

## 2022-12-22 PROCEDURE — 99214 OFFICE O/P EST MOD 30 MIN: CPT | Mod: S$GLB,,, | Performed by: INTERNAL MEDICINE

## 2022-12-22 PROCEDURE — 3079F DIAST BP 80-89 MM HG: CPT | Mod: CPTII,S$GLB,, | Performed by: INTERNAL MEDICINE

## 2022-12-22 PROCEDURE — 3075F PR MOST RECENT SYSTOLIC BLOOD PRESS GE 130-139MM HG: ICD-10-PCS | Mod: CPTII,S$GLB,, | Performed by: INTERNAL MEDICINE

## 2022-12-22 PROCEDURE — 1159F MED LIST DOCD IN RCRD: CPT | Mod: CPTII,S$GLB,, | Performed by: INTERNAL MEDICINE

## 2022-12-22 PROCEDURE — 3008F BODY MASS INDEX DOCD: CPT | Mod: CPTII,S$GLB,, | Performed by: INTERNAL MEDICINE

## 2022-12-22 PROCEDURE — 3079F PR MOST RECENT DIASTOLIC BLOOD PRESSURE 80-89 MM HG: ICD-10-PCS | Mod: CPTII,S$GLB,, | Performed by: INTERNAL MEDICINE

## 2022-12-22 PROCEDURE — 1160F RVW MEDS BY RX/DR IN RCRD: CPT | Mod: CPTII,S$GLB,, | Performed by: INTERNAL MEDICINE

## 2022-12-22 PROCEDURE — 99214 PR OFFICE/OUTPT VISIT, EST, LEVL IV, 30-39 MIN: ICD-10-PCS | Mod: S$GLB,,, | Performed by: INTERNAL MEDICINE

## 2022-12-22 PROCEDURE — 3008F PR BODY MASS INDEX (BMI) DOCUMENTED: ICD-10-PCS | Mod: CPTII,S$GLB,, | Performed by: INTERNAL MEDICINE

## 2022-12-22 PROCEDURE — 1160F PR REVIEW ALL MEDS BY PRESCRIBER/CLIN PHARMACIST DOCUMENTED: ICD-10-PCS | Mod: CPTII,S$GLB,, | Performed by: INTERNAL MEDICINE

## 2022-12-22 PROCEDURE — 1159F PR MEDICATION LIST DOCUMENTED IN MEDICAL RECORD: ICD-10-PCS | Mod: CPTII,S$GLB,, | Performed by: INTERNAL MEDICINE

## 2022-12-22 PROCEDURE — 99999 PR PBB SHADOW E&M-EST. PATIENT-LVL IV: ICD-10-PCS | Mod: PBBFAC,,, | Performed by: INTERNAL MEDICINE

## 2022-12-22 PROCEDURE — 99999 PR PBB SHADOW E&M-EST. PATIENT-LVL IV: CPT | Mod: PBBFAC,,, | Performed by: INTERNAL MEDICINE

## 2022-12-22 PROCEDURE — 3075F SYST BP GE 130 - 139MM HG: CPT | Mod: CPTII,S$GLB,, | Performed by: INTERNAL MEDICINE

## 2022-12-22 RX ORDER — VORTIOXETINE 10 MG/1
1 TABLET, FILM COATED ORAL DAILY
Qty: 90 TABLET | Refills: 1 | Status: SHIPPED | OUTPATIENT
Start: 2022-12-22 | End: 2023-08-21

## 2022-12-22 RX ORDER — CARVEDILOL 25 MG/1
25 TABLET ORAL 2 TIMES DAILY WITH MEALS
Qty: 60 TABLET | Refills: 2 | Status: SHIPPED | OUTPATIENT
Start: 2022-12-22 | End: 2023-03-29

## 2022-12-22 NOTE — PROGRESS NOTES
Assessment and Plan:    1. Essential hypertension  Not well controlled, will increase to 25 mg BID and follow up with nurse visit BP recheck in 1 week.  - carvediloL (COREG) 25 MG tablet; Take 1 tablet (25 mg total) by mouth 2 (two) times daily with meals.  Dispense: 60 tablet; Refill: 2    2. Hypothyroidism, unspecified type  TSH high last year. Not taking this consistently. Discussed how to better take this medication. Recheck TSH in a couple of weeks. Will let me know if she needs a refill before doing this.   - TSH; Future    3. Morbid obesity  Discussed weight in detail as this regards NAFLD. She is not interested in seeing hepatology at this time. Discussed diet and based on prior experiences she is planning to restart weight watchers. Will follow up in 3 months.    4. Mild major depression  - TRINTELLIX 10 mg Tab; Take 1 tablet (10 mg total) by mouth once daily.  Dispense: 90 tablet; Refill: 1    ______________________________________________________________________  Subjective:    Chief Complaint:  Follow up chronic medical conditions.    HPI:  Christin is a 62 y.o. year old female here to follow up chronic medical conditions.     CAD- Seeing cardiology. Stent 5/19. Nuc stress with no ischemia 5/21. On statin, BB, and aspirin.    HLD- Taking atorvastatin 40 mg daily. No side effects.    HTN- Taking coreg 12.5 mg BID. Had previously been on toprol XL but BP not controlled with this, so they had added chlorthalidone, but this made her urinate too much and have cramping, so this was changed to coreg as single agent.    Psoriatic arthritis- Seeing Rheumatology. Prescribed Otezla, but not taking this yet.    Obesity- With recent US showing hepatomegaly and hepatic steatosis. She reports that she regularly binge eats carbs, which is her main problem. She feels motivated to try to lose weight for her son's wedding.    Depression- Taking trintellix 10 mg daily. Seeing Dr. Christina but wanted to see if I would take over  this prescription.    Hypothyroidism- Taking Synthroid (brand) 112 mcg daily. Does not always take this correctly. Misses some doses.    Medications:  Current Outpatient Medications on File Prior to Visit   Medication Sig Dispense Refill    AA/prot/lysine/methio/vit C/B6 (A/G PRO ORAL) Take 2 tablets by mouth once daily.       apremilast (OTEZLA) 30 mg Tab Take 1 tablet (30 mg total) by mouth 2 (two) times daily. 60 tablet 11    ascorbic acid, vitamin C, (VITAMIN C) 1000 MG tablet Take 1,000 mg by mouth once daily.      aspirin (ECOTRIN) 81 MG EC tablet Take 1 tablet (81 mg total) by mouth once daily. 90 tablet 3    atorvastatin (LIPITOR) 40 MG tablet TAKE 1 TABLET BY MOUTH EVERY DAY 90 tablet 3    carvediloL (COREG) 12.5 MG tablet Take 1 tablet (12.5 mg total) by mouth 2 (two) times daily with meals. 60 tablet 11    elderberry fruit and flower 460-115 mg Cap Take by mouth.      multivitamin (THERAGRAN) per tablet Take 1 tablet by mouth once daily.       omega-3 fatty acids/fish oil (FISH OIL-OMEGA-3 FATTY ACIDS) 300-1,000 mg capsule Take 4 capsules by mouth once daily.      SYNTHROID 112 mcg tablet TAKE 1 TABLET (112 MCG TOTAL) BY MOUTH BEFORE BREAKFAST. 90 tablet 3    TRINTELLIX 10 mg Tab Take 1 tablet by mouth once daily.      ubidecarenone/vitamin E mixed (COQ10  ORAL) Take by mouth once daily.      UNABLE TO FIND medication name: Jesica      UNABLE TO FIND once. Testosterone and estrogen cream      vitamin D 1000 units Tab Take 2,000 Units by mouth once daily.       zinc gluconate 50 mg tablet Take 50 mg by mouth once daily.       No current facility-administered medications on file prior to visit.       Review of Systems:  Review of Systems   Constitutional:  Negative for chills and fever.   Respiratory:  Negative for shortness of breath and wheezing.    Cardiovascular:  Negative for chest pain and leg swelling.   Gastrointestinal:  Negative for diarrhea, nausea and vomiting.   Neurological:  Negative  "for dizziness, syncope and light-headedness.     Past Medical History:  Past Medical History:   Diagnosis Date    Arthritis     Depression     Hypertension     Following with Dr. Rafa Mendes of left eye        Objective:    Vitals:  Vitals:    12/22/22 1057   BP: 138/88   Pulse: 66   Resp: 18   SpO2: 97%   Weight: 118.5 kg (261 lb 2.2 oz)   Height: 5' 10" (1.778 m)   PainSc: 0-No pain       Physical Exam  Vitals reviewed.   Constitutional:       General: She is not in acute distress.     Appearance: She is well-developed.   Eyes:      General:         Right eye: No discharge.         Left eye: No discharge.      Conjunctiva/sclera: Conjunctivae normal.   Cardiovascular:      Rate and Rhythm: Normal rate and regular rhythm.   Pulmonary:      Effort: Pulmonary effort is normal. No respiratory distress.   Skin:     General: Skin is warm and dry.   Neurological:      Mental Status: She is alert and oriented to person, place, and time.   Psychiatric:         Behavior: Behavior normal.         Thought Content: Thought content normal.         Judgment: Judgment normal.       Data:  US abd  Impression:     1. Hepatomegaly and hepatic steatosis.  2. Cholelithiasis  3. 8 mm simple cortical cyst in the right renal midpole.    Judi Gonsalez MD  Internal Medicine    "

## 2022-12-29 ENCOUNTER — OFFICE VISIT (OUTPATIENT)
Dept: RHEUMATOLOGY | Facility: CLINIC | Age: 62
End: 2022-12-29
Payer: COMMERCIAL

## 2022-12-29 ENCOUNTER — CLINICAL SUPPORT (OUTPATIENT)
Dept: FAMILY MEDICINE | Facility: CLINIC | Age: 62
End: 2022-12-29
Payer: COMMERCIAL

## 2022-12-29 VITALS — DIASTOLIC BLOOD PRESSURE: 78 MMHG | SYSTOLIC BLOOD PRESSURE: 126 MMHG

## 2022-12-29 VITALS
DIASTOLIC BLOOD PRESSURE: 92 MMHG | SYSTOLIC BLOOD PRESSURE: 148 MMHG | BODY MASS INDEX: 37.9 KG/M2 | HEIGHT: 70 IN | HEART RATE: 71 BPM | WEIGHT: 264.75 LBS

## 2022-12-29 DIAGNOSIS — Z01.30 BP CHECK: Primary | ICD-10-CM

## 2022-12-29 DIAGNOSIS — H15.009 SCLERITIS, UNSPECIFIED LATERALITY: ICD-10-CM

## 2022-12-29 DIAGNOSIS — L40.50 PSA (PSORIATIC ARTHRITIS): Primary | ICD-10-CM

## 2022-12-29 DIAGNOSIS — L40.9 PSORIASIS: ICD-10-CM

## 2022-12-29 PROCEDURE — 3080F DIAST BP >= 90 MM HG: CPT | Mod: CPTII,S$GLB,, | Performed by: INTERNAL MEDICINE

## 2022-12-29 PROCEDURE — 3080F PR MOST RECENT DIASTOLIC BLOOD PRESSURE >= 90 MM HG: ICD-10-PCS | Mod: CPTII,S$GLB,, | Performed by: INTERNAL MEDICINE

## 2022-12-29 PROCEDURE — 99499 NO LOS: ICD-10-PCS | Mod: S$GLB,,, | Performed by: FAMILY MEDICINE

## 2022-12-29 PROCEDURE — 99499 UNLISTED E&M SERVICE: CPT | Mod: S$GLB,,, | Performed by: FAMILY MEDICINE

## 2022-12-29 PROCEDURE — 99215 OFFICE O/P EST HI 40 MIN: CPT | Mod: S$GLB,,, | Performed by: INTERNAL MEDICINE

## 2022-12-29 PROCEDURE — 99999 PR PBB SHADOW E&M-EST. PATIENT-LVL IV: CPT | Mod: PBBFAC,,, | Performed by: INTERNAL MEDICINE

## 2022-12-29 PROCEDURE — 1159F PR MEDICATION LIST DOCUMENTED IN MEDICAL RECORD: ICD-10-PCS | Mod: CPTII,S$GLB,, | Performed by: INTERNAL MEDICINE

## 2022-12-29 PROCEDURE — 3077F SYST BP >= 140 MM HG: CPT | Mod: CPTII,S$GLB,, | Performed by: INTERNAL MEDICINE

## 2022-12-29 PROCEDURE — 3008F BODY MASS INDEX DOCD: CPT | Mod: CPTII,S$GLB,, | Performed by: INTERNAL MEDICINE

## 2022-12-29 PROCEDURE — 99999 PR PBB SHADOW E&M-EST. PATIENT-LVL IV: ICD-10-PCS | Mod: PBBFAC,,, | Performed by: INTERNAL MEDICINE

## 2022-12-29 PROCEDURE — 3077F PR MOST RECENT SYSTOLIC BLOOD PRESSURE >= 140 MM HG: ICD-10-PCS | Mod: CPTII,S$GLB,, | Performed by: INTERNAL MEDICINE

## 2022-12-29 PROCEDURE — 1160F PR REVIEW ALL MEDS BY PRESCRIBER/CLIN PHARMACIST DOCUMENTED: ICD-10-PCS | Mod: CPTII,S$GLB,, | Performed by: INTERNAL MEDICINE

## 2022-12-29 PROCEDURE — 3008F PR BODY MASS INDEX (BMI) DOCUMENTED: ICD-10-PCS | Mod: CPTII,S$GLB,, | Performed by: INTERNAL MEDICINE

## 2022-12-29 PROCEDURE — 1160F RVW MEDS BY RX/DR IN RCRD: CPT | Mod: CPTII,S$GLB,, | Performed by: INTERNAL MEDICINE

## 2022-12-29 PROCEDURE — 99999 PR PBB SHADOW E&M-EST. PATIENT-LVL I: ICD-10-PCS | Mod: PBBFAC,,,

## 2022-12-29 PROCEDURE — 1159F MED LIST DOCD IN RCRD: CPT | Mod: CPTII,S$GLB,, | Performed by: INTERNAL MEDICINE

## 2022-12-29 PROCEDURE — 99999 PR PBB SHADOW E&M-EST. PATIENT-LVL I: CPT | Mod: PBBFAC,,,

## 2022-12-29 PROCEDURE — 99215 PR OFFICE/OUTPT VISIT, EST, LEVL V, 40-54 MIN: ICD-10-PCS | Mod: S$GLB,,, | Performed by: INTERNAL MEDICINE

## 2022-12-29 NOTE — PROGRESS NOTES
Subjective:          Chief Complaint: Christin Caruso is a 62 y.o. female who had concerns including Disease Management.    HPI: Psoriasis, inflammatory arthritis with MRI + erosion,  and +NIESHA  Previous serologies NIESHA + 1:160 homogenous, RF negative, ESR/CRP wnl, +TPO Ab.  She has had a chronic left foot pain uses arch support she had a similar pattern in the right foot.  Predominantly through the arch and midfoot that she experiences discomfort. Over the last few months the swelling in the left foot has markedly improved.   Hands or progressing with the PIP and right IP joints. Enlarging some swelling.     She is noting more stiffness in hands (fingers), joints are waking her up night.   She is having night and AM hand stiffness. Not over 1 hour.       Previous medication;  MTX with hair lost  SSZ with discoloration sweat    Patient eleveted to hold off on further DMARD/Biologic. She was approved for Otezla has not started.       No prior hx of sicca.       2/2022  Patient did try sulfasalzine noted discolored sweat. Not on for very long. Stopped SSZ.  Left foot 1/10 today, seen with PMD dept. Arch support. Has similar complaint intermittently with right foot.   Seen with podiatry but noting more edema end of day and pain that is diffuse through the midfoot,   Tylenol does help.     9/14/20:   Patient here for eval of PsA:  2019 new diagnosis for psoriatic arthritis started methotrexate unfortunately she was having increasing hair loss thusly discontinued 7/2019  Hair improved stopping MTX.   She tried leflunomide for only a few days but became fearful side effects  Offered sulfasalazine- ? Of sulfa allergy but she will check with dermatology about this (sounds like metrogel, not a sulfa. )  Hands still painful and stiff, noting new enlarging right 5th DIP/PIP hypertrophy.       She has some morning stiffness nothing that exceeds 20 min  +CAD with stents.     3/11/20: patient was concerned that Psoriasis on scalp was  secondary to CPAP straps, trial with padding and all skin has cleared.   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 Some pain in top of foot wakes her only about 30% of time. No pain with walking.       MRI 03/13/2019 shows enhancement at the 1st metacarpal with synovial articulation joint effusion increased T2 signal and a sub subluxation versus ligamentous laxity she had some cortical erosions questioned but none overt found mostly degenerative changes noted at the 1st metacarpal and carpal articulations.  She did have fluid signal intensity seen in the flexor digitorum suggestive of a tenosynovitis and there is widening within the scapholunate ligament also suggestive of an inflammatory arthritis.    Prior Hx:   Patient is a 59-year-old female self-referred for joint aches and pains has been seen by me in 2016 all attributed to osteoarthritis but she did have a low titer positive NIESHA 1:160 homogeneous negative NIESHA profile.  That time last seen she was doing well with aleve for joint aches and pains.  As of 2019 she was seen Dr. Zuniga on the [place] noticing that the last 6 months of 2018 she was having a increased and progressive arthralgia now involving the entire right hand left hand was now painful still having stiffness in the morning less than 5 min for back hip and legs.    She has more recently been diagnosed with by temporal scalp psoriasis with Dr. Norma Waldrop given topical solution.  Dr. Zuniga did have an MRI done which did show some inflammatory arthritis.  There was a question of whether starting Plaquenil would work but given the risk for flaring of the psoriasis methotrexate was preferred .    She has been on methotrexate 10 mg weekly with marked improvement in her joints        REVIEW OF SYSTEMS:    Review of Systems   Constitutional:  Negative for fever, malaise/fatigue and weight loss.   HENT:  Negative for sore throat.    Eyes:  Negative for  double vision, photophobia and redness.   Respiratory:  Negative for cough, shortness of breath and wheezing.    Cardiovascular:  Negative for chest pain, palpitations and orthopnea.   Gastrointestinal:  Negative for abdominal pain, constipation and diarrhea.   Genitourinary:  Negative for dysuria, hematuria and urgency.   Musculoskeletal:  Positive for joint pain. Negative for back pain and myalgias.   Skin:  Negative for rash.   Neurological:  Negative for dizziness, tingling, focal weakness and headaches.   Endo/Heme/Allergies:  Does not bruise/bleed easily.   Psychiatric/Behavioral:  Negative for depression, hallucinations and suicidal ideas.              Objective:            Past Medical History:   Diagnosis Date    Arthritis     Depression     Hypertension     Following with Dr. Rafa Mendes of left eye      Family History   Problem Relation Age of Onset    Heart disease Mother     Arthritis Mother     Cataracts Mother     Diabetes Father     Heart disease Father     Cataracts Father     Diabetes Sister     Strabismus Sister     Diabetes Brother     Heart disease Brother     Strabismus Brother     Diabetes Paternal Aunt     Heart disease Maternal Grandfather     Cancer Neg Hx     Amblyopia Neg Hx     Blindness Neg Hx     Glaucoma Neg Hx     Macular degeneration Neg Hx     Retinal detachment Neg Hx      Social History     Tobacco Use    Smoking status: Former     Packs/day: 0.15     Years: 4.00     Pack years: 0.60     Types: Cigarettes     Quit date: 10/1/2012     Years since quitting: 10.2    Smokeless tobacco: Never   Substance Use Topics    Alcohol use: Yes     Alcohol/week: 1.0 standard drink     Types: 1 Glasses of wine per week     Comment: socially    Drug use: No         Current Outpatient Medications on File Prior to Visit   Medication Sig Dispense Refill    AA/prot/lysine/methio/vit C/B6 (A/G PRO ORAL) Take 2 tablets by mouth once daily.       apremilast (OTEZLA) 30 mg Tab Take 1 tablet (30  mg total) by mouth 2 (two) times daily. 60 tablet 11    ascorbic acid, vitamin C, (VITAMIN C) 1000 MG tablet Take 1,000 mg by mouth once daily.      atorvastatin (LIPITOR) 40 MG tablet TAKE 1 TABLET BY MOUTH EVERY DAY 90 tablet 3    carvediloL (COREG) 25 MG tablet Take 1 tablet (25 mg total) by mouth 2 (two) times daily with meals. 60 tablet 2    elderberry fruit and flower 460-115 mg Cap Take by mouth.      multivitamin (THERAGRAN) per tablet Take 1 tablet by mouth once daily.       omega-3 fatty acids/fish oil (FISH OIL-OMEGA-3 FATTY ACIDS) 300-1,000 mg capsule Take 4 capsules by mouth once daily.      SYNTHROID 112 mcg tablet TAKE 1 TABLET (112 MCG TOTAL) BY MOUTH BEFORE BREAKFAST. 90 tablet 3    TRINTELLIX 10 mg Tab Take 1 tablet (10 mg total) by mouth once daily. 90 tablet 1    ubidecarenone/vitamin E mixed (COQ10  ORAL) Take by mouth once daily.      UNABLE TO FIND medication name: Jesica      UNABLE TO FIND once. Testosterone and estrogen cream      vitamin D 1000 units Tab Take 2,000 Units by mouth once daily.       zinc gluconate 50 mg tablet Take 50 mg by mouth once daily.      aspirin (ECOTRIN) 81 MG EC tablet Take 1 tablet (81 mg total) by mouth once daily. 90 tablet 3     No current facility-administered medications on file prior to visit.       Vitals:    12/29/22 1456   BP: (!) 148/92   Pulse: 71       Physical Exam:    Physical Exam  Constitutional:       Appearance: Normal appearance. She is well-developed.   HENT:      Nose: No septal deviation.      Mouth/Throat:      Mouth: No oral lesions.   Eyes:      Conjunctiva/sclera:      Right eye: Right conjunctiva is not injected.      Left eye: Left conjunctiva is not injected.      Pupils: Pupils are equal, round, and reactive to light.   Neck:      Thyroid: No thyroid mass or thyromegaly.      Vascular: No JVD.   Cardiovascular:      Rate and Rhythm: Normal rate and regular rhythm.      Pulses: Normal pulses.      Comments: No  edema  Pulmonary:      Effort: Pulmonary effort is normal.      Breath sounds: Normal breath sounds.   Abdominal:      Palpations: Abdomen is soft.   Musculoskeletal:      Right shoulder: No swelling or tenderness. Normal range of motion.      Left shoulder: No swelling or tenderness. Normal range of motion.      Right elbow: No swelling. Normal range of motion. No tenderness.      Left elbow: No swelling. Normal range of motion. No tenderness.      Right wrist: Tenderness present. No swelling. Decreased range of motion.      Left wrist: Tenderness and bony tenderness present. No swelling. Decreased range of motion.      Right hip: Normal range of motion. Normal strength.      Left hip: No tenderness. Normal range of motion.      Right knee: No swelling. Normal range of motion. No tenderness.      Left knee: No swelling. Normal range of motion. No tenderness.      Right ankle: No swelling. No tenderness. Normal range of motion.      Left ankle: No swelling. No tenderness. Normal range of motion.      Comments: Right 3rd IP with subluxation and bony hypertrophy with loss of flexion. + large heberden nodes b/l. Right hand with more significant deformity. 1+ synovitis unalble to finger curl.  impaired.   Lymphadenopathy:      Cervical: No cervical adenopathy.   Skin:     General: Skin is dry.   Neurological:      Mental Status: She is alert and oriented to person, place, and time. Mental status is at baseline.             Assessment:       Encounter Diagnoses   Name Primary?    PSA (psoriatic arthritis) Yes    Psoriasis     Scleritis, unspecified laterality             Plan:        PSA (psoriatic arthritis)    Psoriasis    Scleritis, unspecified laterality  Comments:  vs. dry eye. will call eye doctor.           Very pleasant 62  Y/owith erosive (MRI) likely Psoriatic Arthritis   Historical + NIESHA, neg RF, neg CCP  2019 w/ dx w  Psoriasis- small scale temples today  Recent parotitis 3/2022 with dryness of mouth ?  2nd sjogrens vs viral LAD.     Progression of joints and skin over last 6 months   Failed MTX-hair growing back  Attempted start leflunomide patient did not use for concern of ASE.    SSZ for sweat discoloration   Otezla approved but not started: discussed more about med and she will continue to consider.     Discussed the risk of untreated psoriatic arthritis and inflammatory arthritis could be a progression and ultimate loss of joint function. ? Erosion/ with tenosynovitis. on her MRI and I think periodic and frequent monitoring of her function would be reasonable.         No follow-ups on file.    60 min consultation with greater than 50% of that time included Preparing to see the patient (review records, tests), Obtaining and/or reviewing separately obtained historical data, Performing a medically appropriate examination and/or evaluation , Ordering medications, tests, and/or procedures, Referring and communicating with other healthcare professionals , Documenting clinical information in the electronic or other health record and Independently interpreting results  (as warranted) & communicating results to the patient/family/caregiver. All questions answered.

## 2022-12-29 NOTE — PROGRESS NOTES
Christin Caruso 62 y.o. female is here today for Blood Pressure check.   History of HTN yes.    Review of patient's allergies indicates:   Allergen Reactions    [place] Other (See Comments) and Swelling     Tongue swells, angiodema     Creatinine   Date Value Ref Range Status   11/29/2022 0.7 0.5 - 1.4 mg/dL Final     Sodium   Date Value Ref Range Status   11/29/2022 144 136 - 145 mmol/L Final     Potassium   Date Value Ref Range Status   11/29/2022 4.0 3.5 - 5.1 mmol/L Final   ]  Patient verifies taking blood pressure medications on a regular basis at the same time of the day.     Current Outpatient Medications:     AA/prot/lysine/methio/vit C/B6 (A/G PRO ORAL), Take 2 tablets by mouth once daily. , Disp: , Rfl:     apremilast (OTEZLA) 30 mg Tab, Take 1 tablet (30 mg total) by mouth 2 (two) times daily., Disp: 60 tablet, Rfl: 11    ascorbic acid, vitamin C, (VITAMIN C) 1000 MG tablet, Take 1,000 mg by mouth once daily., Disp: , Rfl:     aspirin (ECOTRIN) 81 MG EC tablet, Take 1 tablet (81 mg total) by mouth once daily., Disp: 90 tablet, Rfl: 3    atorvastatin (LIPITOR) 40 MG tablet, TAKE 1 TABLET BY MOUTH EVERY DAY, Disp: 90 tablet, Rfl: 3    carvediloL (COREG) 25 MG tablet, Take 1 tablet (25 mg total) by mouth 2 (two) times daily with meals., Disp: 60 tablet, Rfl: 2    elderberry fruit and flower 460-115 mg Cap, Take by mouth., Disp: , Rfl:     multivitamin (THERAGRAN) per tablet, Take 1 tablet by mouth once daily. , Disp: , Rfl:     omega-3 fatty acids/fish oil (FISH OIL-OMEGA-3 FATTY ACIDS) 300-1,000 mg capsule, Take 4 capsules by mouth once daily., Disp: , Rfl:     SYNTHROID 112 mcg tablet, TAKE 1 TABLET (112 MCG TOTAL) BY MOUTH BEFORE BREAKFAST., Disp: 90 tablet, Rfl: 3    TRINTELLIX 10 mg Tab, Take 1 tablet (10 mg total) by mouth once daily., Disp: 90 tablet, Rfl: 1    ubidecarenone/vitamin E mixed (COQ10  ORAL), Take by mouth once daily., Disp: , Rfl:     UNABLE TO FIND, medication name: Tollovid, Disp: ,  Rfl:     UNABLE TO FIND, once. Testosterone and estrogen cream, Disp: , Rfl:     vitamin D 1000 units Tab, Take 2,000 Units by mouth once daily. , Disp: , Rfl:     zinc gluconate 50 mg tablet, Take 50 mg by mouth once daily., Disp: , Rfl:   Does patient have record of home blood pressure readings no.   Last dose of blood pressure medication was taken at 9:30 AM this morning.  Patient is asymptomatic.       BP: 126/78       Dr. Gonsalez notified.

## 2023-01-04 ENCOUNTER — OFFICE VISIT (OUTPATIENT)
Dept: OPTOMETRY | Facility: CLINIC | Age: 63
End: 2023-01-04
Payer: COMMERCIAL

## 2023-01-04 DIAGNOSIS — H52.7 REFRACTIVE ERROR: ICD-10-CM

## 2023-01-04 DIAGNOSIS — H25.13 NUCLEAR SCLEROSIS OF BOTH EYES: ICD-10-CM

## 2023-01-04 DIAGNOSIS — H04.123 BILATERAL DRY EYES: Primary | ICD-10-CM

## 2023-01-04 PROCEDURE — 99999 PR PBB SHADOW E&M-EST. PATIENT-LVL III: ICD-10-PCS | Mod: PBBFAC,,, | Performed by: OPTOMETRIST

## 2023-01-04 PROCEDURE — 1159F PR MEDICATION LIST DOCUMENTED IN MEDICAL RECORD: ICD-10-PCS | Mod: CPTII,S$GLB,, | Performed by: OPTOMETRIST

## 2023-01-04 PROCEDURE — 92014 PR EYE EXAM, EST PATIENT,COMPREHESV: ICD-10-PCS | Mod: S$GLB,,, | Performed by: OPTOMETRIST

## 2023-01-04 PROCEDURE — 1160F RVW MEDS BY RX/DR IN RCRD: CPT | Mod: CPTII,S$GLB,, | Performed by: OPTOMETRIST

## 2023-01-04 PROCEDURE — 92014 COMPRE OPH EXAM EST PT 1/>: CPT | Mod: S$GLB,,, | Performed by: OPTOMETRIST

## 2023-01-04 PROCEDURE — 1160F PR REVIEW ALL MEDS BY PRESCRIBER/CLIN PHARMACIST DOCUMENTED: ICD-10-PCS | Mod: CPTII,S$GLB,, | Performed by: OPTOMETRIST

## 2023-01-04 PROCEDURE — 1159F MED LIST DOCD IN RCRD: CPT | Mod: CPTII,S$GLB,, | Performed by: OPTOMETRIST

## 2023-01-04 PROCEDURE — 99999 PR PBB SHADOW E&M-EST. PATIENT-LVL III: CPT | Mod: PBBFAC,,, | Performed by: OPTOMETRIST

## 2023-01-04 NOTE — PROGRESS NOTES
HPI     Concerns About Ocular Health     Additional comments: Ocular health exam           Comments    DLS: 2/13/20    Pt states blurry vision at dist over past year. Uses OTC readers only. +   occ floaters, no flashes.     Gtts: AT's 2 times weekly.           Last edited by Cheryle Quintana on 1/4/2023  9:14 AM.            Assessment /Plan     For exam results, see Encounter Report.    Bilateral dry eyes    Nuclear sclerosis of both eyes    Refractive error      Recommend artificial tears. 1 drop 2x per day. Chronicity of disease and treatment discussed.   2. Educated pt on presence of cataracts and effects on vision. No surgery at this time. Recheck in one year.   3. Spectacle Rx given, discussed different options for glasses. RTC 1 year routine eye exam.

## 2023-01-13 ENCOUNTER — LAB VISIT (OUTPATIENT)
Dept: LAB | Facility: HOSPITAL | Age: 63
End: 2023-01-13
Attending: INTERNAL MEDICINE
Payer: COMMERCIAL

## 2023-01-13 DIAGNOSIS — E03.9 HYPOTHYROIDISM, UNSPECIFIED TYPE: ICD-10-CM

## 2023-01-13 LAB — TSH SERPL DL<=0.005 MIU/L-ACNC: 1.78 UIU/ML (ref 0.4–4)

## 2023-01-13 PROCEDURE — 36415 COLL VENOUS BLD VENIPUNCTURE: CPT | Mod: PO | Performed by: INTERNAL MEDICINE

## 2023-01-13 PROCEDURE — 84443 ASSAY THYROID STIM HORMONE: CPT | Performed by: INTERNAL MEDICINE

## 2023-01-27 ENCOUNTER — OFFICE VISIT (OUTPATIENT)
Dept: FAMILY MEDICINE | Facility: CLINIC | Age: 63
End: 2023-01-27
Payer: COMMERCIAL

## 2023-01-27 VITALS
SYSTOLIC BLOOD PRESSURE: 134 MMHG | HEART RATE: 78 BPM | WEIGHT: 255.19 LBS | HEIGHT: 70 IN | OXYGEN SATURATION: 95 % | RESPIRATION RATE: 18 BRPM | DIASTOLIC BLOOD PRESSURE: 86 MMHG | BODY MASS INDEX: 36.53 KG/M2

## 2023-01-27 DIAGNOSIS — I10 ESSENTIAL HYPERTENSION: Primary | ICD-10-CM

## 2023-01-27 DIAGNOSIS — E03.9 HYPOTHYROIDISM, UNSPECIFIED TYPE: ICD-10-CM

## 2023-01-27 PROCEDURE — 99214 PR OFFICE/OUTPT VISIT, EST, LEVL IV, 30-39 MIN: ICD-10-PCS | Mod: S$GLB,,, | Performed by: INTERNAL MEDICINE

## 2023-01-27 PROCEDURE — 3079F PR MOST RECENT DIASTOLIC BLOOD PRESSURE 80-89 MM HG: ICD-10-PCS | Mod: CPTII,S$GLB,, | Performed by: INTERNAL MEDICINE

## 2023-01-27 PROCEDURE — 1160F PR REVIEW ALL MEDS BY PRESCRIBER/CLIN PHARMACIST DOCUMENTED: ICD-10-PCS | Mod: CPTII,S$GLB,, | Performed by: INTERNAL MEDICINE

## 2023-01-27 PROCEDURE — 99999 PR PBB SHADOW E&M-EST. PATIENT-LVL V: ICD-10-PCS | Mod: PBBFAC,,, | Performed by: INTERNAL MEDICINE

## 2023-01-27 PROCEDURE — 3008F PR BODY MASS INDEX (BMI) DOCUMENTED: ICD-10-PCS | Mod: CPTII,S$GLB,, | Performed by: INTERNAL MEDICINE

## 2023-01-27 PROCEDURE — 3075F PR MOST RECENT SYSTOLIC BLOOD PRESS GE 130-139MM HG: ICD-10-PCS | Mod: CPTII,S$GLB,, | Performed by: INTERNAL MEDICINE

## 2023-01-27 PROCEDURE — 99214 OFFICE O/P EST MOD 30 MIN: CPT | Mod: S$GLB,,, | Performed by: INTERNAL MEDICINE

## 2023-01-27 PROCEDURE — 3075F SYST BP GE 130 - 139MM HG: CPT | Mod: CPTII,S$GLB,, | Performed by: INTERNAL MEDICINE

## 2023-01-27 PROCEDURE — 99999 PR PBB SHADOW E&M-EST. PATIENT-LVL V: CPT | Mod: PBBFAC,,, | Performed by: INTERNAL MEDICINE

## 2023-01-27 PROCEDURE — 1159F MED LIST DOCD IN RCRD: CPT | Mod: CPTII,S$GLB,, | Performed by: INTERNAL MEDICINE

## 2023-01-27 PROCEDURE — 1159F PR MEDICATION LIST DOCUMENTED IN MEDICAL RECORD: ICD-10-PCS | Mod: CPTII,S$GLB,, | Performed by: INTERNAL MEDICINE

## 2023-01-27 PROCEDURE — 3079F DIAST BP 80-89 MM HG: CPT | Mod: CPTII,S$GLB,, | Performed by: INTERNAL MEDICINE

## 2023-01-27 PROCEDURE — 3008F BODY MASS INDEX DOCD: CPT | Mod: CPTII,S$GLB,, | Performed by: INTERNAL MEDICINE

## 2023-01-27 PROCEDURE — 1160F RVW MEDS BY RX/DR IN RCRD: CPT | Mod: CPTII,S$GLB,, | Performed by: INTERNAL MEDICINE

## 2023-01-27 RX ORDER — NAPROXEN SODIUM 220 MG/1
81 TABLET, FILM COATED ORAL DAILY
COMMUNITY

## 2023-01-27 RX ORDER — LEVOTHYROXINE SODIUM 112 UG/1
112 TABLET ORAL
Qty: 90 TABLET | Refills: 3 | Status: SHIPPED | OUTPATIENT
Start: 2023-01-27 | End: 2023-11-07

## 2023-01-27 RX ORDER — CHLORTHALIDONE 25 MG/1
25 TABLET ORAL
COMMUNITY
Start: 2023-01-22 | End: 2023-10-17

## 2023-01-27 NOTE — PROGRESS NOTES
Assessment and Plan:    1. Essential hypertension  Controlled, continue current medications. Discussed taking medication more regularly and reducing caffeine intake.    2. Hypothyroidism, unspecified type  TSH normal, continue current medications.  - SYNTHROID 112 mcg tablet; Take 1 tablet (112 mcg total) by mouth before breakfast.  Dispense: 90 tablet; Refill: 3      ______________________________________________________________________  Subjective:    Chief Complaint:  Follow up chronic medical conditions.    HPI:  Christin is a 63 y.o. year old female here to follow up chronic medical conditions.     HTN- Taking coreg 25 mg BID. Dose was increased last visit. With this she reports that her BP has generally been controlled. Has not taken medication yet today. Had 3 cups of coffee this morning. Had previously been on toprol XL but BP not controlled with this, so they had added chlorthalidone, but this made her urinate too much and have cramping, so this was changed to coreg as single agent.    CAD- Seeing cardiology. Stent 5/19. Nuc stress with no ischemia 5/21. On statin, BB, and aspirin.     HLD- Taking atorvastatin 40 mg daily. No side effects.     Psoriatic arthritis- Seeing Rheumatology. She reports that she has been on an anti-inflammatory diet and has found this to be very helpful.      Obesity- Has been working on diet changes and has been losing weight.      Depression- Taking trintellix 10 mg daily.      Hypothyroidism- Taking Synthroid (brand) 112 mcg daily. She reports today that she has been taking this regularly as prescribed.    Medications:  Current Outpatient Medications on File Prior to Visit   Medication Sig Dispense Refill    AA/prot/lysine/methio/vit C/B6 (A/G PRO ORAL) Take 2 tablets by mouth once daily.       ascorbic acid, vitamin C, (VITAMIN C) 1000 MG tablet Take 1,000 mg by mouth once daily.      aspirin 81 MG Chew Take 81 mg by mouth once daily.      atorvastatin (LIPITOR) 40 MG tablet  "TAKE 1 TABLET BY MOUTH EVERY DAY 90 tablet 3    carvediloL (COREG) 25 MG tablet Take 1 tablet (25 mg total) by mouth 2 (two) times daily with meals. 60 tablet 2    elderberry fruit and flower 460-115 mg Cap Take by mouth.      microfibrillar collagen powder Apply 1 g topically as needed.      multivitamin (THERAGRAN) per tablet Take 1 tablet by mouth once daily.       omega-3 fatty acids/fish oil (FISH OIL-OMEGA-3 FATTY ACIDS) 300-1,000 mg capsule Take 4 capsules by mouth once daily.      TRINTELLIX 10 mg Tab Take 1 tablet (10 mg total) by mouth once daily. 90 tablet 1    ubidecarenone/vitamin E mixed (COQ10  ORAL) Take by mouth once daily.      UNABLE TO FIND medication name: Jesica      UNABLE TO FIND once. Testosterone and estrogen cream      vitamin D 1000 units Tab Take 2,000 Units by mouth once daily.       zinc gluconate 50 mg tablet Take 50 mg by mouth once daily.      [DISCONTINUED] SYNTHROID 112 mcg tablet TAKE 1 TABLET (112 MCG TOTAL) BY MOUTH BEFORE BREAKFAST. 90 tablet 3    chlorthalidone (HYGROTEN) 25 MG Tab Take 25 mg by mouth.      [DISCONTINUED] apremilast (OTEZLA) 30 mg Tab Take 1 tablet (30 mg total) by mouth 2 (two) times daily. 60 tablet 11     No current facility-administered medications on file prior to visit.       Review of Systems:  Review of Systems   Respiratory:  Negative for chest tightness, shortness of breath and wheezing.    Cardiovascular:  Negative for chest pain, palpitations and leg swelling.   Genitourinary:  Negative for difficulty urinating and dysuria.     Past Medical History:  Past Medical History:   Diagnosis Date    Arthritis     Cataract     Depression     Hypertension     Following with Dr. Rafa Mendes of left eye        Objective:    Vitals:  Vitals:    01/27/23 1009 01/27/23 1045   BP: (!) 146/76 134/86   Pulse: 78    Resp: 18    SpO2: 95%    Weight: 115.8 kg (255 lb 2.9 oz)    Height: 5' 10" (1.778 m)    PainSc: 0-No pain        Physical " Exam  Vitals reviewed.   Constitutional:       General: She is not in acute distress.     Appearance: She is well-developed.   Eyes:      General:         Right eye: No discharge.         Left eye: No discharge.      Conjunctiva/sclera: Conjunctivae normal.   Cardiovascular:      Rate and Rhythm: Normal rate and regular rhythm.   Pulmonary:      Effort: Pulmonary effort is normal. No respiratory distress.   Skin:     General: Skin is warm and dry.   Neurological:      Mental Status: She is alert and oriented to person, place, and time.   Psychiatric:         Behavior: Behavior normal.         Thought Content: Thought content normal.         Judgment: Judgment normal.       Data:  Cr 0.7 on last labs    Judi Gonsalez MD  Internal Medicine

## 2023-02-13 ENCOUNTER — TELEPHONE (OUTPATIENT)
Dept: RHEUMATOLOGY | Facility: CLINIC | Age: 63
End: 2023-02-13
Payer: COMMERCIAL

## 2023-02-13 NOTE — TELEPHONE ENCOUNTER
----- Message from Briana Hebert sent at 2/13/2023  8:20 AM CST -----  Contact: Patient  Type:  Same Day Appointment Request    Caller is requesting a same day appointment.  Caller declined first available appointment listed below.    Name of Caller: Patient     When is the first available appointment? 05/17    Symptoms: Severe back pain     Best Call Back Number: [phone] (home)     Additional Information:  Patient needs to be seen today     Patient booked 2-15-23 due to pain flare. Advised the patient to go to the ER or urgent care if gets worse. CG        
Airway patent without stridor, Nasal mucosa clear. Mouth with normal mucosa. Throat has no vesicles, no oropharyngeal exudates and uvula is midline.

## 2023-05-04 ENCOUNTER — PATIENT MESSAGE (OUTPATIENT)
Dept: RHEUMATOLOGY | Facility: CLINIC | Age: 63
End: 2023-05-04
Payer: COMMERCIAL

## 2023-05-11 ENCOUNTER — OFFICE VISIT (OUTPATIENT)
Dept: FAMILY MEDICINE | Facility: CLINIC | Age: 63
End: 2023-05-11
Payer: COMMERCIAL

## 2023-05-11 VITALS
DIASTOLIC BLOOD PRESSURE: 76 MMHG | RESPIRATION RATE: 16 BRPM | HEART RATE: 65 BPM | WEIGHT: 249.69 LBS | SYSTOLIC BLOOD PRESSURE: 128 MMHG | OXYGEN SATURATION: 95 % | BODY MASS INDEX: 35.75 KG/M2 | HEIGHT: 70 IN

## 2023-05-11 DIAGNOSIS — F32.0 MILD MAJOR DEPRESSION: ICD-10-CM

## 2023-05-11 DIAGNOSIS — E03.9 HYPOTHYROIDISM, UNSPECIFIED TYPE: ICD-10-CM

## 2023-05-11 DIAGNOSIS — K76.0 NAFLD (NONALCOHOLIC FATTY LIVER DISEASE): ICD-10-CM

## 2023-05-11 DIAGNOSIS — R73.03 PREDIABETES: Primary | ICD-10-CM

## 2023-05-11 DIAGNOSIS — E66.01 MORBID OBESITY: ICD-10-CM

## 2023-05-11 DIAGNOSIS — I10 ESSENTIAL HYPERTENSION: ICD-10-CM

## 2023-05-11 DIAGNOSIS — L40.50 PSA (PSORIATIC ARTHRITIS): ICD-10-CM

## 2023-05-11 PROBLEM — Z79.899 DRUG-INDUCED IMMUNODEFICIENCY: Status: RESOLVED | Noted: 2022-12-22 | Resolved: 2023-05-11

## 2023-05-11 PROBLEM — D84.821 DRUG-INDUCED IMMUNODEFICIENCY: Status: RESOLVED | Noted: 2022-12-22 | Resolved: 2023-05-11

## 2023-05-11 PROCEDURE — 99214 OFFICE O/P EST MOD 30 MIN: CPT | Mod: S$GLB,,, | Performed by: INTERNAL MEDICINE

## 2023-05-11 PROCEDURE — 3074F SYST BP LT 130 MM HG: CPT | Mod: CPTII,S$GLB,, | Performed by: INTERNAL MEDICINE

## 2023-05-11 PROCEDURE — 1160F PR REVIEW ALL MEDS BY PRESCRIBER/CLIN PHARMACIST DOCUMENTED: ICD-10-PCS | Mod: CPTII,S$GLB,, | Performed by: INTERNAL MEDICINE

## 2023-05-11 PROCEDURE — 3074F PR MOST RECENT SYSTOLIC BLOOD PRESSURE < 130 MM HG: ICD-10-PCS | Mod: CPTII,S$GLB,, | Performed by: INTERNAL MEDICINE

## 2023-05-11 PROCEDURE — 3078F PR MOST RECENT DIASTOLIC BLOOD PRESSURE < 80 MM HG: ICD-10-PCS | Mod: CPTII,S$GLB,, | Performed by: INTERNAL MEDICINE

## 2023-05-11 PROCEDURE — 1160F RVW MEDS BY RX/DR IN RCRD: CPT | Mod: CPTII,S$GLB,, | Performed by: INTERNAL MEDICINE

## 2023-05-11 PROCEDURE — 1159F MED LIST DOCD IN RCRD: CPT | Mod: CPTII,S$GLB,, | Performed by: INTERNAL MEDICINE

## 2023-05-11 PROCEDURE — 99999 PR PBB SHADOW E&M-EST. PATIENT-LVL IV: ICD-10-PCS | Mod: PBBFAC,,, | Performed by: INTERNAL MEDICINE

## 2023-05-11 PROCEDURE — 3078F DIAST BP <80 MM HG: CPT | Mod: CPTII,S$GLB,, | Performed by: INTERNAL MEDICINE

## 2023-05-11 PROCEDURE — 99999 PR PBB SHADOW E&M-EST. PATIENT-LVL IV: CPT | Mod: PBBFAC,,, | Performed by: INTERNAL MEDICINE

## 2023-05-11 PROCEDURE — 1159F PR MEDICATION LIST DOCUMENTED IN MEDICAL RECORD: ICD-10-PCS | Mod: CPTII,S$GLB,, | Performed by: INTERNAL MEDICINE

## 2023-05-11 PROCEDURE — 99214 PR OFFICE/OUTPT VISIT, EST, LEVL IV, 30-39 MIN: ICD-10-PCS | Mod: S$GLB,,, | Performed by: INTERNAL MEDICINE

## 2023-05-11 PROCEDURE — 3008F BODY MASS INDEX DOCD: CPT | Mod: CPTII,S$GLB,, | Performed by: INTERNAL MEDICINE

## 2023-05-11 PROCEDURE — 3008F PR BODY MASS INDEX (BMI) DOCUMENTED: ICD-10-PCS | Mod: CPTII,S$GLB,, | Performed by: INTERNAL MEDICINE

## 2023-05-11 RX ORDER — SEMAGLUTIDE 0.68 MG/ML
0.5 INJECTION, SOLUTION SUBCUTANEOUS
Qty: 1 EACH | Refills: 0 | Status: SHIPPED | OUTPATIENT
Start: 2023-05-11 | End: 2023-10-17

## 2023-05-11 RX ORDER — IVERMECTIN 10 MG/G
CREAM TOPICAL
COMMUNITY
Start: 2023-05-10

## 2023-05-11 NOTE — PROGRESS NOTES
Assessment and Plan:    1. Prediabetes  Discussed options and have elected to start semaglutide. Discussed that this medication is intended to supress appetite, and will not work without the patient also focusing on diet and exercise. We discussed that long term success would be best achieved by getting on a structured diet plan and using this medication to assist with feelings of hunger and appetite while sticking to a healthy diet plan and increasing exercise. Discussed that we will recheck weight, HR, and BP in 1 month. Discussed possible side effects and how to use this correctly.  - semaglutide (OZEMPIC) 0.25 mg or 0.5 mg (2 mg/3 mL) pen injector; Inject 0.5 mg into the skin every 7 days.  Dispense: 1 each; Refill: 0  - Comprehensive Metabolic Panel; Future  - Hemoglobin A1C; Future    2. NAFLD (nonalcoholic fatty liver disease)  - semaglutide (OZEMPIC) 0.25 mg or 0.5 mg (2 mg/3 mL) pen injector; Inject 0.5 mg into the skin every 7 days.  Dispense: 1 each; Refill: 0  - Comprehensive Metabolic Panel; Future    3. Essential hypertension  - semaglutide (OZEMPIC) 0.25 mg or 0.5 mg (2 mg/3 mL) pen injector; Inject 0.5 mg into the skin every 7 days.  Dispense: 1 each; Refill: 0  - Comprehensive Metabolic Panel; Future    4. Morbid obesity  - semaglutide (OZEMPIC) 0.25 mg or 0.5 mg (2 mg/3 mL) pen injector; Inject 0.5 mg into the skin every 7 days.  Dispense: 1 each; Refill: 0    5. Hypothyroidism, unspecified type  - TSH; Future    6. PSA (psoriatic arthritis)  Continue follow up with Rheum.    7. Mild major depression  Continue trintellix    ______________________________________________________________________  Subjective:    Chief Complaint:  Discuss medications    HPI:  Christin is a 63 y.o. year old female here to discuss medications.     HTN- Taking coreg 25 mg BID.      CAD- Seeing cardiology. Stent 5/19. Nuc stress with no ischemia 5/21. On statin, BB, and aspirin.     HLD- Taking atorvastatin 40 mg daily. No  side effects.     Psoriatic arthritis- Seeing Rheumatology.      Obesity- Has been working on diet changes but has really been struggling. Wanted to discuss ozempic.      Depression- Taking trintellix 10 mg daily.      Hypothyroidism- Taking Synthroid (brand) 112 mcg daily. She reports today that she has been taking this regularly as prescribed.      Medications:  Current Outpatient Medications on File Prior to Visit   Medication Sig Dispense Refill    AA/prot/lysine/methio/vit C/B6 (A/G PRO ORAL) Take 2 tablets by mouth once daily.       ascorbic acid, vitamin C, (VITAMIN C) 1000 MG tablet Take 1,000 mg by mouth once daily.      aspirin 81 MG Chew Take 81 mg by mouth once daily.      atorvastatin (LIPITOR) 40 MG tablet TAKE 1 TABLET BY MOUTH EVERY DAY 90 tablet 3    carvediloL (COREG) 25 MG tablet TAKE 1 TABLET BY MOUTH TWICE A DAY WITH FOOD 180 tablet 1    microfibrillar collagen powder Apply 1 g topically as needed.      multivitamin (THERAGRAN) per tablet Take 1 tablet by mouth once daily.       omega-3 fatty acids/fish oil (FISH OIL-OMEGA-3 FATTY ACIDS) 300-1,000 mg capsule Take 4 capsules by mouth once daily.      SOOLANTRA 1 % Crea Apply topically.      SYNTHROID 112 mcg tablet Take 1 tablet (112 mcg total) by mouth before breakfast. 90 tablet 3    TRINTELLIX 10 mg Tab Take 1 tablet (10 mg total) by mouth once daily. 90 tablet 1    ubidecarenone/vitamin E mixed (COQ10  ORAL) Take by mouth once daily.      UNABLE TO FIND medication name: Jesica      UNABLE TO FIND once. Testosterone and estrogen cream      vitamin D 1000 units Tab Take 2,000 Units by mouth once daily.       zinc gluconate 50 mg tablet Take 50 mg by mouth once daily.      chlorthalidone (HYGROTEN) 25 MG Tab Take 25 mg by mouth.       No current facility-administered medications on file prior to visit.       Review of Systems:  Review of Systems   Constitutional:  Positive for appetite change. Negative for chills and fever.  "  Respiratory:  Negative for shortness of breath and wheezing.    Cardiovascular:  Negative for chest pain and leg swelling.   Gastrointestinal:  Negative for diarrhea, nausea and vomiting.   Neurological:  Negative for dizziness, syncope and light-headedness.     Entered by patient and reviewed and updated during visit      Past Medical History:  Past Medical History:   Diagnosis Date    Arthritis     Cataract     Depression     Hypertension     Following with Dr. Rafa Mendes of left eye        Objective:    Vitals:  Vitals:    05/11/23 1112   BP: 128/76   Pulse: 65   Resp: 16   SpO2: 95%   Weight: 113.3 kg (249 lb 10.7 oz)   Height: 5' 10" (1.778 m)       Physical Exam  Vitals reviewed.   Constitutional:       General: She is not in acute distress.     Appearance: She is well-developed.   Eyes:      General:         Right eye: No discharge.         Left eye: No discharge.      Conjunctiva/sclera: Conjunctivae normal.   Cardiovascular:      Rate and Rhythm: Normal rate and regular rhythm.   Pulmonary:      Effort: Pulmonary effort is normal. No respiratory distress.   Skin:     General: Skin is warm and dry.   Neurological:      Mental Status: She is alert and oriented to person, place, and time.   Psychiatric:         Behavior: Behavior normal.         Thought Content: Thought content normal.         Judgment: Judgment normal.       Data:  Previous A1c 5.7      Judi Gonsalez MD  Internal Medicine    "

## 2023-05-23 NOTE — TELEPHONE ENCOUNTER
----- Message from Jonathan Solitario sent at 8/6/2019 10:38 AM CDT -----  Contact: same  Unsuccessful IM sent to office.  Patient called in and stated she was returning call to office just now about scheduling appt and some changes in her medications.  Patient stated she wants Dr. Brown to be her rheumatologist?    Call back number is [phone]    Patient will remain with Dr. Brown and cancel [facility]. Patient stopped MTX due to hair loss and wants to discuss options. CG     Olanzapine Counseling- I discussed with the patient the common side effects of olanzapine including but are not limited to: lack of energy, dry mouth, increased appetite, sleepiness, tremor, constipation, dizziness, changes in behavior, or restlessness.  Explained that teenagers are more likely to experience headaches, abdominal pain, pain in the arms or legs, tiredness, and sleepiness.  Serious side effects include but are not limited: increased risk of death in elderly patients who are confused, have memory loss, or dementia-related psychosis; hyperglycemia; increased cholesterol and triglycerides; and weight gain.

## 2023-06-19 ENCOUNTER — TELEPHONE (OUTPATIENT)
Dept: RHEUMATOLOGY | Facility: CLINIC | Age: 63
End: 2023-06-19
Payer: COMMERCIAL

## 2023-06-19 NOTE — TELEPHONE ENCOUNTER
----- Message from Gerardo Veliz sent at 6/19/2023  3:15 PM CDT -----  Type: Needs Medical Advice  Who Called:  Pt  Best Call Back Number: [phone]  Additional Information: Pt had an appt on 6/13, she missed it and would like to get it rescheduled pl call bk to advise thanks

## 2023-06-20 ENCOUNTER — LAB VISIT (OUTPATIENT)
Dept: LAB | Facility: HOSPITAL | Age: 63
End: 2023-06-20
Attending: INTERNAL MEDICINE
Payer: COMMERCIAL

## 2023-06-20 DIAGNOSIS — I10 ESSENTIAL HYPERTENSION: ICD-10-CM

## 2023-06-20 DIAGNOSIS — R73.03 PREDIABETES: ICD-10-CM

## 2023-06-20 DIAGNOSIS — E03.9 HYPOTHYROIDISM, UNSPECIFIED TYPE: ICD-10-CM

## 2023-06-20 DIAGNOSIS — K76.0 NAFLD (NONALCOHOLIC FATTY LIVER DISEASE): ICD-10-CM

## 2023-06-20 LAB
ALBUMIN SERPL BCP-MCNC: 3.6 G/DL (ref 3.5–5.2)
ALP SERPL-CCNC: 68 U/L (ref 55–135)
ALT SERPL W/O P-5'-P-CCNC: 16 U/L (ref 10–44)
ANION GAP SERPL CALC-SCNC: 8 MMOL/L (ref 8–16)
AST SERPL-CCNC: 18 U/L (ref 10–40)
BILIRUB SERPL-MCNC: 1 MG/DL (ref 0.1–1)
BUN SERPL-MCNC: 17 MG/DL (ref 8–23)
CALCIUM SERPL-MCNC: 9 MG/DL (ref 8.7–10.5)
CHLORIDE SERPL-SCNC: 106 MMOL/L (ref 95–110)
CO2 SERPL-SCNC: 29 MMOL/L (ref 23–29)
CREAT SERPL-MCNC: 0.9 MG/DL (ref 0.5–1.4)
EST. GFR  (NO RACE VARIABLE): >60 ML/MIN/1.73 M^2
ESTIMATED AVG GLUCOSE: 108 MG/DL (ref 68–131)
GLUCOSE SERPL-MCNC: 102 MG/DL (ref 70–110)
HBA1C MFR BLD: 5.4 % (ref 4–5.6)
POTASSIUM SERPL-SCNC: 4.8 MMOL/L (ref 3.5–5.1)
PROT SERPL-MCNC: 6 G/DL (ref 6–8.4)
SODIUM SERPL-SCNC: 143 MMOL/L (ref 136–145)
TSH SERPL DL<=0.005 MIU/L-ACNC: 2 UIU/ML (ref 0.4–4)

## 2023-06-20 PROCEDURE — 84443 ASSAY THYROID STIM HORMONE: CPT | Performed by: INTERNAL MEDICINE

## 2023-06-20 PROCEDURE — 83036 HEMOGLOBIN GLYCOSYLATED A1C: CPT | Performed by: INTERNAL MEDICINE

## 2023-06-20 PROCEDURE — 36415 COLL VENOUS BLD VENIPUNCTURE: CPT | Mod: PO | Performed by: INTERNAL MEDICINE

## 2023-06-20 PROCEDURE — 80053 COMPREHEN METABOLIC PANEL: CPT | Performed by: INTERNAL MEDICINE

## 2023-06-26 ENCOUNTER — PATIENT MESSAGE (OUTPATIENT)
Dept: RHEUMATOLOGY | Facility: CLINIC | Age: 63
End: 2023-06-26
Payer: COMMERCIAL

## 2023-07-06 ENCOUNTER — OFFICE VISIT (OUTPATIENT)
Dept: RHEUMATOLOGY | Facility: CLINIC | Age: 63
End: 2023-07-06
Payer: COMMERCIAL

## 2023-07-06 VITALS
BODY MASS INDEX: 35.19 KG/M2 | WEIGHT: 245.81 LBS | DIASTOLIC BLOOD PRESSURE: 80 MMHG | HEIGHT: 70 IN | HEART RATE: 70 BPM | SYSTOLIC BLOOD PRESSURE: 134 MMHG

## 2023-07-06 DIAGNOSIS — D18.01 CHERRY ANGIOMA: ICD-10-CM

## 2023-07-06 DIAGNOSIS — L29.9 PRURITUS: ICD-10-CM

## 2023-07-06 DIAGNOSIS — L40.50 PSA (PSORIATIC ARTHRITIS): Primary | ICD-10-CM

## 2023-07-06 DIAGNOSIS — L40.9 PSORIASIS: ICD-10-CM

## 2023-07-06 PROCEDURE — 3008F PR BODY MASS INDEX (BMI) DOCUMENTED: ICD-10-PCS | Mod: CPTII,S$GLB,, | Performed by: INTERNAL MEDICINE

## 2023-07-06 PROCEDURE — 3079F PR MOST RECENT DIASTOLIC BLOOD PRESSURE 80-89 MM HG: ICD-10-PCS | Mod: CPTII,S$GLB,, | Performed by: INTERNAL MEDICINE

## 2023-07-06 PROCEDURE — 3008F BODY MASS INDEX DOCD: CPT | Mod: CPTII,S$GLB,, | Performed by: INTERNAL MEDICINE

## 2023-07-06 PROCEDURE — 3044F PR MOST RECENT HEMOGLOBIN A1C LEVEL <7.0%: ICD-10-PCS | Mod: CPTII,S$GLB,, | Performed by: INTERNAL MEDICINE

## 2023-07-06 PROCEDURE — 1159F MED LIST DOCD IN RCRD: CPT | Mod: CPTII,S$GLB,, | Performed by: INTERNAL MEDICINE

## 2023-07-06 PROCEDURE — 3075F SYST BP GE 130 - 139MM HG: CPT | Mod: CPTII,S$GLB,, | Performed by: INTERNAL MEDICINE

## 2023-07-06 PROCEDURE — 3079F DIAST BP 80-89 MM HG: CPT | Mod: CPTII,S$GLB,, | Performed by: INTERNAL MEDICINE

## 2023-07-06 PROCEDURE — 99999 PR PBB SHADOW E&M-EST. PATIENT-LVL IV: ICD-10-PCS | Mod: PBBFAC,,, | Performed by: INTERNAL MEDICINE

## 2023-07-06 PROCEDURE — 99214 OFFICE O/P EST MOD 30 MIN: CPT | Mod: S$GLB,,, | Performed by: INTERNAL MEDICINE

## 2023-07-06 PROCEDURE — 3044F HG A1C LEVEL LT 7.0%: CPT | Mod: CPTII,S$GLB,, | Performed by: INTERNAL MEDICINE

## 2023-07-06 PROCEDURE — 99999 PR PBB SHADOW E&M-EST. PATIENT-LVL IV: CPT | Mod: PBBFAC,,, | Performed by: INTERNAL MEDICINE

## 2023-07-06 PROCEDURE — 1159F PR MEDICATION LIST DOCUMENTED IN MEDICAL RECORD: ICD-10-PCS | Mod: CPTII,S$GLB,, | Performed by: INTERNAL MEDICINE

## 2023-07-06 PROCEDURE — 3075F PR MOST RECENT SYSTOLIC BLOOD PRESS GE 130-139MM HG: ICD-10-PCS | Mod: CPTII,S$GLB,, | Performed by: INTERNAL MEDICINE

## 2023-07-06 PROCEDURE — 99214 PR OFFICE/OUTPT VISIT, EST, LEVL IV, 30-39 MIN: ICD-10-PCS | Mod: S$GLB,,, | Performed by: INTERNAL MEDICINE

## 2023-07-06 ASSESSMENT — ROUTINE ASSESSMENT OF PATIENT INDEX DATA (RAPID3)
FATIGUE SCORE: 0
PSYCHOLOGICAL DISTRESS SCORE: 0
MDHAQ FUNCTION SCORE: 0
PATIENT GLOBAL ASSESSMENT SCORE: 3
PAIN SCORE: 0
TOTAL RAPID3 SCORE: 1

## 2023-07-06 NOTE — PROGRESS NOTES
Subjective:          Chief Complaint: Christin Caruso is a 63 y.o. female who had concerns including Disease Management.    HPI: Psoriasis, inflammatory arthritis with MRI + erosion,  and +NIESHA  Previous serologies NIESHA + 1:160 homogenous, RF negative, ESR/CRP wnl, +TPO Ab.  She has had a chronic left foot pain uses arch support she had a similar pattern in the right foot.  Predominantly through the arch and midfoot that she experiences discomfort. Over the last few months the swelling in the left foot has markedly improved.   Hands or progressing with the PIP and right IP joints. Enlarging some swelling.     She is noting more stiffness in hands (fingers), joint better this visit than last.   She is having night and AM hand stiffness. Not over 1 hour.     Patient x 1 year     Previous medication;  MTX with hair lost  SSZ with discoloration sweat    Patient eleveted to hold off on further DMARD/Biologic. She was approved for Otezla has not started.   No prior hx of sicca.       2/2022  Patient did try sulfasalzine noted discolored sweat. Not on for very long. Stopped SSZ.  Left foot 1/10 today, seen with PMD dept. Arch support. Has similar complaint intermittently with right foot.   Seen with podiatry but noting more edema end of day and pain that is diffuse through the midfoot,   Tylenol does help.     9/14/20:   Patient here for eval of PsA:  2019 new diagnosis for psoriatic arthritis started methotrexate unfortunately she was having increasing hair loss thusly discontinued 7/2019  Hair improved stopping MTX.   She tried leflunomide for only a few days but became fearful side effects  Offered sulfasalazine- ? Of sulfa allergy but she will check with dermatology about this (sounds like metrogel, not a sulfa. )  Hands still painful and stiff, noting new enlarging right 5th DIP/PIP hypertrophy.       She has some morning stiffness nothing that exceeds 20 min  +CAD with stents.     3/11/20: patient was concerned that  Psoriasis on scalp was secondary to CPAP straps, trial with padding and all skin has cleared.   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 Some pain in top of foot wakes her only about 30% of time. No pain with walking.       MRI 03/13/2019 shows enhancement at the 1st metacarpal with synovial articulation joint effusion increased T2 signal and a sub subluxation versus ligamentous laxity she had some cortical erosions questioned but none overt found mostly degenerative changes noted at the 1st metacarpal and carpal articulations.  She did have fluid signal intensity seen in the flexor digitorum suggestive of a tenosynovitis and there is widening within the scapholunate ligament also suggestive of an inflammatory arthritis.    Prior Hx:   Patient is a 59-year-old female self-referred for joint aches and pains has been seen by me in 2016 all attributed to osteoarthritis but she did have a low titer positive NIESHA 1:160 homogeneous negative NIESHA profile.  That time last seen she was doing well with aleve for joint aches and pains.  As of 2019 she was seen Dr. Zuniga on the [place] noticing that the last 6 months of 2018 she was having a increased and progressive arthralgia now involving the entire right hand left hand was now painful still having stiffness in the morning less than 5 min for back hip and legs.    She has more recently been diagnosed with by temporal scalp psoriasis with Dr. Norma Waldrop given topical solution.  Dr. Zuniga did have an MRI done which did show some inflammatory arthritis.  There was a question of whether starting Plaquenil would work but given the risk for flaring of the psoriasis methotrexate was preferred .    She has been on methotrexate 10 mg weekly with marked improvement in her joints        REVIEW OF SYSTEMS:    Review of Systems   Constitutional:  Negative for fever, malaise/fatigue and weight loss.   HENT:  Negative for sore throat.     Eyes:  Negative for double vision, photophobia and redness.   Respiratory:  Negative for cough, shortness of breath and wheezing.    Cardiovascular:  Negative for chest pain, palpitations and orthopnea.   Gastrointestinal:  Negative for abdominal pain, constipation and diarrhea.   Genitourinary:  Negative for dysuria, hematuria and urgency.   Musculoskeletal:  Positive for joint pain. Negative for back pain and myalgias.   Skin:  Negative for rash.   Neurological:  Negative for dizziness, tingling, focal weakness and headaches.   Endo/Heme/Allergies:  Does not bruise/bleed easily.   Psychiatric/Behavioral:  Negative for depression, hallucinations and suicidal ideas.              Objective:            Past Medical History:   Diagnosis Date    Arthritis     Cataract     Depression     Hypertension     Following with Dr. Rafa Mendes of left eye      Family History   Problem Relation Age of Onset    Heart disease Mother     Arthritis Mother     Cataracts Mother     Diabetes Father     Heart disease Father     Cataracts Father     Diabetes Sister     Strabismus Sister     Diabetes Brother     Heart disease Brother     Strabismus Brother     Diabetes Paternal Aunt     Heart disease Maternal Grandfather     Cancer Neg Hx     Amblyopia Neg Hx     Blindness Neg Hx     Glaucoma Neg Hx     Macular degeneration Neg Hx     Retinal detachment Neg Hx      Social History     Tobacco Use    Smoking status: Former     Packs/day: 0.15     Years: 4.00     Pack years: 0.60     Types: Cigarettes     Quit date: 10/1/2012     Years since quitting: 10.7    Smokeless tobacco: Never   Substance Use Topics    Alcohol use: Yes     Alcohol/week: 1.0 standard drink     Types: 1 Glasses of wine per week     Comment: socially    Drug use: No         Current Outpatient Medications on File Prior to Visit   Medication Sig Dispense Refill    AA/prot/lysine/methio/vit C/B6 (A/G PRO ORAL) Take 2 tablets by mouth once daily.       ascorbic  acid, vitamin C, (VITAMIN C) 1000 MG tablet Take 1,000 mg by mouth once daily.      aspirin 81 MG Chew Take 81 mg by mouth once daily.      atorvastatin (LIPITOR) 40 MG tablet TAKE 1 TABLET BY MOUTH EVERY DAY 90 tablet 3    carvediloL (COREG) 25 MG tablet TAKE 1 TABLET BY MOUTH TWICE A DAY WITH FOOD 180 tablet 1    chlorthalidone (HYGROTEN) 25 MG Tab Take 25 mg by mouth.      microfibrillar collagen powder Apply 1 g topically as needed.      multivitamin (THERAGRAN) per tablet Take 1 tablet by mouth once daily.       omega-3 fatty acids/fish oil (FISH OIL-OMEGA-3 FATTY ACIDS) 300-1,000 mg capsule Take 4 capsules by mouth once daily.      semaglutide (OZEMPIC) 0.25 mg or 0.5 mg (2 mg/3 mL) pen injector Inject 0.5 mg into the skin every 7 days. 1 each 0    SOOLANTRA 1 % Crea Apply topically.      SYNTHROID 112 mcg tablet Take 1 tablet (112 mcg total) by mouth before breakfast. 90 tablet 3    TRINTELLIX 10 mg Tab Take 1 tablet (10 mg total) by mouth once daily. 90 tablet 1    ubidecarenone/vitamin E mixed (COQ10  ORAL) Take by mouth once daily.      UNABLE TO FIND medication name: Jesica      UNABLE TO FIND once. Testosterone and estrogen cream      vitamin D 1000 units Tab Take 2,000 Units by mouth once daily.       zinc gluconate 50 mg tablet Take 50 mg by mouth once daily.       No current facility-administered medications on file prior to visit.       Vitals:    07/06/23 0934   BP: 134/80   Pulse: 70       Physical Exam:    Physical Exam  Constitutional:       Appearance: Normal appearance. She is well-developed.   HENT:      Nose: No septal deviation.      Mouth/Throat:      Mouth: No oral lesions.   Eyes:      Conjunctiva/sclera:      Right eye: Right conjunctiva is not injected.      Left eye: Left conjunctiva is not injected.      Pupils: Pupils are equal, round, and reactive to light.   Neck:      Thyroid: No thyroid mass or thyromegaly.      Vascular: No JVD.   Cardiovascular:      Rate and Rhythm:  Normal rate and regular rhythm.      Pulses: Normal pulses.      Comments: No edema  Pulmonary:      Effort: Pulmonary effort is normal.      Breath sounds: Normal breath sounds.   Abdominal:      Palpations: Abdomen is soft.   Musculoskeletal:      Right shoulder: No swelling or tenderness. Normal range of motion.      Left shoulder: No swelling or tenderness. Normal range of motion.      Right elbow: No swelling. Normal range of motion. No tenderness.      Left elbow: No swelling. Normal range of motion. No tenderness.      Right wrist: Tenderness present. No swelling. Decreased range of motion.      Left wrist: Tenderness and bony tenderness present. No swelling. Decreased range of motion.      Right hip: Normal range of motion. Normal strength.      Left hip: No tenderness. Normal range of motion.      Right knee: No swelling. Normal range of motion. No tenderness.      Left knee: No swelling. Normal range of motion. No tenderness.      Right ankle: No swelling. No tenderness. Normal range of motion.      Left ankle: No swelling. No tenderness. Normal range of motion.      Comments: Right 3rd IP with subluxation and bony hypertrophy with loss of flexion. + large heberden nodes b/l. Right hand with more significant deformity. 1+ synovitis unalble to finger curl.  impaired.   Lymphadenopathy:      Cervical: No cervical adenopathy.   Skin:     General: Skin is dry.   Neurological:      Mental Status: She is alert and oriented to person, place, and time. Mental status is at baseline.             Assessment:       Encounter Diagnoses   Name Primary?    PSA (psoriatic arthritis) Yes    Psoriasis     Cherry angioma     Pruritus               Plan:        PSA (psoriatic arthritis)    Psoriasis    Cherry angioma    Pruritus    Very pleasant 62 y/owith erosive (MRI) likely Psoriatic Arthritis   Historical + NIESHA, neg RF, neg CCP  2019 w/ dx w  Psoriasis- small scale temples today  Recent parotitis 3/2022 with dryness  of mouth ? 2nd sjogrens vs viral LAD.     Progression of joints and skin over last 6 months   Failed MTX-hair growing back  Attempted start leflunomide patient did not use for concern of ASE.    SSZ for sweat discoloration   Otezla approved but not started: discussed more about med and she will continue to consider.     Discussed the risk of untreated psoriatic arthritis and inflammatory arthritis could be a progression and ultimate loss of joint function. ? Erosion/ with tenosynovitis. on her MRI and I think periodic and frequent monitoring of her function would be reasonable.       Follow up in about 9 months (around 4/6/2024).    40 min consultation with greater than 50% of that time included Preparing to see the patient (review records, tests), Obtaining and/or reviewing separately obtained historical data, Performing a medically appropriate examination and/or evaluation , Ordering medications, tests, and/or procedures, Referring and communicating with other healthcare professionals , Documenting clinical information in the electronic or other health record and Independently interpreting results  (as warranted) & communicating results to the patient/family/caregiver. All questions answered.

## 2023-08-21 DIAGNOSIS — F32.0 MILD MAJOR DEPRESSION: ICD-10-CM

## 2023-08-21 RX ORDER — VORTIOXETINE 10 MG/1
1 TABLET, FILM COATED ORAL
Qty: 90 TABLET | Refills: 2 | Status: SHIPPED | OUTPATIENT
Start: 2023-08-21

## 2023-08-21 NOTE — TELEPHONE ENCOUNTER
No care due was identified.  [facility] Embedded Care Due Messages. Reference number: [id number].   8/21/2023 11:12:32 AM CDT

## 2023-08-21 NOTE — TELEPHONE ENCOUNTER
Refill Decision Note   Christin Caruso  is requesting a refill authorization.  Brief Assessment and Rationale for Refill:  Approve     Medication Therapy Plan:         Comments:     Note composed:12:52 PM 08/21/2023

## 2023-10-04 ENCOUNTER — PATIENT MESSAGE (OUTPATIENT)
Dept: FAMILY MEDICINE | Facility: CLINIC | Age: 63
End: 2023-10-04
Payer: COMMERCIAL

## 2023-10-04 DIAGNOSIS — Z12.31 ENCOUNTER FOR SCREENING MAMMOGRAM FOR MALIGNANT NEOPLASM OF BREAST: ICD-10-CM

## 2023-10-04 DIAGNOSIS — Z78.0 ASYMPTOMATIC POSTMENOPAUSAL STATE: ICD-10-CM

## 2023-10-04 DIAGNOSIS — Z00.00 PREVENTATIVE HEALTH CARE: Primary | ICD-10-CM

## 2023-10-06 NOTE — TELEPHONE ENCOUNTER
Labs and imaging ordered.  Please schedule follow-up with me after the labs and imaging to discuss the results.

## 2023-10-16 ENCOUNTER — LAB VISIT (OUTPATIENT)
Dept: LAB | Facility: HOSPITAL | Age: 63
End: 2023-10-16
Payer: COMMERCIAL

## 2023-10-16 DIAGNOSIS — Z00.00 PREVENTATIVE HEALTH CARE: ICD-10-CM

## 2023-10-16 LAB
25(OH)D3+25(OH)D2 SERPL-MCNC: 43 NG/ML (ref 30–96)
ALBUMIN SERPL BCP-MCNC: 3.7 G/DL (ref 3.5–5.2)
ALP SERPL-CCNC: 68 U/L (ref 55–135)
ALT SERPL W/O P-5'-P-CCNC: 21 U/L (ref 10–44)
ANION GAP SERPL CALC-SCNC: 9 MMOL/L (ref 8–16)
AST SERPL-CCNC: 19 U/L (ref 10–40)
BILIRUB SERPL-MCNC: 0.9 MG/DL (ref 0.1–1)
BUN SERPL-MCNC: 18 MG/DL (ref 8–23)
CALCIUM SERPL-MCNC: 8.9 MG/DL (ref 8.7–10.5)
CHLORIDE SERPL-SCNC: 108 MMOL/L (ref 95–110)
CHOLEST SERPL-MCNC: 122 MG/DL (ref 120–199)
CHOLEST/HDLC SERPL: 4.4 {RATIO} (ref 2–5)
CO2 SERPL-SCNC: 26 MMOL/L (ref 23–29)
CREAT SERPL-MCNC: 0.8 MG/DL (ref 0.5–1.4)
EST. GFR  (NO RACE VARIABLE): >60 ML/MIN/1.73 M^2
ESTIMATED AVG GLUCOSE: 108 MG/DL (ref 68–131)
GLUCOSE SERPL-MCNC: 111 MG/DL (ref 70–110)
HBA1C MFR BLD: 5.4 % (ref 4–5.6)
HDLC SERPL-MCNC: 28 MG/DL (ref 40–75)
HDLC SERPL: 23 % (ref 20–50)
LDLC SERPL CALC-MCNC: 69.6 MG/DL (ref 63–159)
NONHDLC SERPL-MCNC: 94 MG/DL
POTASSIUM SERPL-SCNC: 4.6 MMOL/L (ref 3.5–5.1)
PROT SERPL-MCNC: 6.6 G/DL (ref 6–8.4)
SODIUM SERPL-SCNC: 143 MMOL/L (ref 136–145)
T3 SERPL-MCNC: 94 NG/DL (ref 60–180)
T4 FREE SERPL-MCNC: 1.08 NG/DL (ref 0.71–1.51)
TRIGL SERPL-MCNC: 122 MG/DL (ref 30–150)
TSH SERPL DL<=0.005 MIU/L-ACNC: 4.14 UIU/ML (ref 0.4–4)

## 2023-10-16 PROCEDURE — 36415 COLL VENOUS BLD VENIPUNCTURE: CPT | Mod: PO | Performed by: INTERNAL MEDICINE

## 2023-10-16 PROCEDURE — 82306 VITAMIN D 25 HYDROXY: CPT | Performed by: INTERNAL MEDICINE

## 2023-10-16 PROCEDURE — 84480 ASSAY TRIIODOTHYRONINE (T3): CPT | Performed by: INTERNAL MEDICINE

## 2023-10-16 PROCEDURE — 80053 COMPREHEN METABOLIC PANEL: CPT | Performed by: INTERNAL MEDICINE

## 2023-10-16 PROCEDURE — 84439 ASSAY OF FREE THYROXINE: CPT | Performed by: INTERNAL MEDICINE

## 2023-10-16 PROCEDURE — 83036 HEMOGLOBIN GLYCOSYLATED A1C: CPT | Performed by: INTERNAL MEDICINE

## 2023-10-16 PROCEDURE — 80061 LIPID PANEL: CPT | Performed by: INTERNAL MEDICINE

## 2023-10-16 PROCEDURE — 84443 ASSAY THYROID STIM HORMONE: CPT | Performed by: INTERNAL MEDICINE

## 2023-10-18 ENCOUNTER — TELEPHONE (OUTPATIENT)
Dept: FAMILY MEDICINE | Facility: CLINIC | Age: 63
End: 2023-10-18
Payer: COMMERCIAL

## 2023-10-18 NOTE — TELEPHONE ENCOUNTER
She was supposed to schedule a follow-up with me after the labs to discuss the results.  I do not currently see anything scheduled.  I do need to discuss these results with her.

## 2023-11-07 ENCOUNTER — OFFICE VISIT (OUTPATIENT)
Dept: FAMILY MEDICINE | Facility: CLINIC | Age: 63
End: 2023-11-07
Payer: COMMERCIAL

## 2023-11-07 VITALS
DIASTOLIC BLOOD PRESSURE: 82 MMHG | OXYGEN SATURATION: 97 % | HEIGHT: 70 IN | WEIGHT: 253.5 LBS | RESPIRATION RATE: 16 BRPM | SYSTOLIC BLOOD PRESSURE: 116 MMHG | HEART RATE: 64 BPM | BODY MASS INDEX: 36.29 KG/M2

## 2023-11-07 DIAGNOSIS — E03.9 HYPOTHYROIDISM, UNSPECIFIED TYPE: ICD-10-CM

## 2023-11-07 DIAGNOSIS — L65.9 HAIR LOSS: ICD-10-CM

## 2023-11-07 DIAGNOSIS — I25.84 CORONARY ARTERY DISEASE DUE TO CALCIFIED CORONARY LESION: Primary | ICD-10-CM

## 2023-11-07 DIAGNOSIS — I25.10 CORONARY ARTERY DISEASE DUE TO CALCIFIED CORONARY LESION: Primary | ICD-10-CM

## 2023-11-07 PROCEDURE — 99999 PR PBB SHADOW E&M-EST. PATIENT-LVL IV: CPT | Mod: PBBFAC,,, | Performed by: INTERNAL MEDICINE

## 2023-11-07 PROCEDURE — 99214 OFFICE O/P EST MOD 30 MIN: CPT | Mod: S$GLB,,, | Performed by: INTERNAL MEDICINE

## 2023-11-07 PROCEDURE — 3074F PR MOST RECENT SYSTOLIC BLOOD PRESSURE < 130 MM HG: ICD-10-PCS | Mod: CPTII,S$GLB,, | Performed by: INTERNAL MEDICINE

## 2023-11-07 PROCEDURE — 1160F RVW MEDS BY RX/DR IN RCRD: CPT | Mod: CPTII,S$GLB,, | Performed by: INTERNAL MEDICINE

## 2023-11-07 PROCEDURE — 3044F HG A1C LEVEL LT 7.0%: CPT | Mod: CPTII,S$GLB,, | Performed by: INTERNAL MEDICINE

## 2023-11-07 PROCEDURE — 99999 PR PBB SHADOW E&M-EST. PATIENT-LVL IV: ICD-10-PCS | Mod: PBBFAC,,, | Performed by: INTERNAL MEDICINE

## 2023-11-07 PROCEDURE — 3079F PR MOST RECENT DIASTOLIC BLOOD PRESSURE 80-89 MM HG: ICD-10-PCS | Mod: CPTII,S$GLB,, | Performed by: INTERNAL MEDICINE

## 2023-11-07 PROCEDURE — 99214 PR OFFICE/OUTPT VISIT, EST, LEVL IV, 30-39 MIN: ICD-10-PCS | Mod: S$GLB,,, | Performed by: INTERNAL MEDICINE

## 2023-11-07 PROCEDURE — 3008F PR BODY MASS INDEX (BMI) DOCUMENTED: ICD-10-PCS | Mod: CPTII,S$GLB,, | Performed by: INTERNAL MEDICINE

## 2023-11-07 PROCEDURE — 1159F PR MEDICATION LIST DOCUMENTED IN MEDICAL RECORD: ICD-10-PCS | Mod: CPTII,S$GLB,, | Performed by: INTERNAL MEDICINE

## 2023-11-07 PROCEDURE — 3044F PR MOST RECENT HEMOGLOBIN A1C LEVEL <7.0%: ICD-10-PCS | Mod: CPTII,S$GLB,, | Performed by: INTERNAL MEDICINE

## 2023-11-07 PROCEDURE — 3074F SYST BP LT 130 MM HG: CPT | Mod: CPTII,S$GLB,, | Performed by: INTERNAL MEDICINE

## 2023-11-07 PROCEDURE — 3008F BODY MASS INDEX DOCD: CPT | Mod: CPTII,S$GLB,, | Performed by: INTERNAL MEDICINE

## 2023-11-07 PROCEDURE — 1160F PR REVIEW ALL MEDS BY PRESCRIBER/CLIN PHARMACIST DOCUMENTED: ICD-10-PCS | Mod: CPTII,S$GLB,, | Performed by: INTERNAL MEDICINE

## 2023-11-07 PROCEDURE — 1159F MED LIST DOCD IN RCRD: CPT | Mod: CPTII,S$GLB,, | Performed by: INTERNAL MEDICINE

## 2023-11-07 PROCEDURE — 3079F DIAST BP 80-89 MM HG: CPT | Mod: CPTII,S$GLB,, | Performed by: INTERNAL MEDICINE

## 2023-11-07 RX ORDER — ATORVASTATIN CALCIUM 40 MG/1
40 TABLET, FILM COATED ORAL DAILY
Qty: 90 TABLET | Refills: 3 | Status: SHIPPED | OUTPATIENT
Start: 2023-11-07 | End: 2023-12-21

## 2023-11-07 RX ORDER — LEVOTHYROXINE SODIUM 125 UG/1
125 TABLET ORAL
Qty: 90 TABLET | Refills: 1 | Status: SHIPPED | OUTPATIENT
Start: 2023-11-07 | End: 2024-11-06

## 2023-11-07 NOTE — PROGRESS NOTES
Assessment and Plan:    1. Coronary artery disease, s/p stent mid RCA 4.0 x 40 mm ORSIRO, prox-mid LCx 3.0 x 40 ORSIRO, 5/19  - atorvastatin (LIPITOR) 40 MG tablet; Take 1 tablet (40 mg total) by mouth once daily.  Dispense: 90 tablet; Refill: 3    2. Hypothyroidism, unspecified type  Will try increasing dose.   - SYNTHROID 125 mcg tablet; Take 1 tablet (125 mcg total) by mouth before breakfast.  Dispense: 90 tablet; Refill: 1  - T4, Free; Future  - T3; Future  - TSH; Future    3. Hair loss  - Iron and TIBC; Future  - Ferritin; Future  - CBC Auto Differential; Future    Will reschedule mammogram and DEXA.    ______________________________________________________________________  Subjective:    Chief Complaint:  Follow up chronic medical conditions.    HPI:  Christin is a 63 y.o. year old female here to follow up chronic medical conditions.     Obesity and prediabetes- Recent A1c 5.4. She had taken ozempic for a short time earlier this year but had vision changes with this so she ended up stopping this.     HTN- Taking coreg 25 mg BID. BP has been controlled with this.     CAD- Seeing cardiology. Stent 5/19. Nuc stress with no ischemia 5/21. On statin, BB, and aspirin.     HLD- Taking atorvastatin 40 mg daily. No side effects. Recent lipids normal.      Psoriatic arthritis- Seeing Rheumatology.      Depression- Taking trintellix 10 mg daily.      Hypothyroidism- Taking Synthroid (brand) 112 mcg daily. She reports today that she has been taking this regularly as prescribed. Recent TFTs with slightly elevated TSH. Patient reports hair loss and would like to try increasing the dose.    Vitamin D deficiency- Recent lab 43, she is taking vitamin D intermittently.     Medications:  Current Outpatient Medications on File Prior to Visit   Medication Sig Dispense Refill    AA/prot/lysine/methio/vit C/B6 (A/G PRO ORAL) Take 2 tablets by mouth once daily.       ascorbic acid, vitamin C, (VITAMIN C) 1000 MG tablet Take 1,000 mg by  "mouth once daily.      aspirin 81 MG Chew Take 81 mg by mouth once daily.      atorvastatin (LIPITOR) 40 MG tablet TAKE 1 TABLET BY MOUTH EVERY DAY 90 tablet 3    carvediloL (COREG) 25 MG tablet TAKE 1 TABLET BY MOUTH TWICE A DAY WITH FOOD 180 tablet 2    microfibrillar collagen powder Apply 1 g topically as needed.      multivitamin (THERAGRAN) per tablet Take 1 tablet by mouth once daily.       omega-3 fatty acids/fish oil (FISH OIL-OMEGA-3 FATTY ACIDS) 300-1,000 mg capsule Take 4 capsules by mouth once daily.      SOOLANTRA 1 % Crea Apply topically.      SYNTHROID 112 mcg tablet Take 1 tablet (112 mcg total) by mouth before breakfast. 90 tablet 3    TRINTELLIX 10 mg Tab TAKE 1 TABLET BY MOUTH EVERY DAY 90 tablet 2    ubidecarenone/vitamin E mixed (COQ10  ORAL) Take by mouth once daily.      UNABLE TO FIND medication name: Jesica      UNABLE TO FIND once. Testosterone and estrogen cream      vitamin D 1000 units Tab Take 2,000 Units by mouth once daily.       zinc gluconate 50 mg tablet Take 50 mg by mouth once daily.       No current facility-administered medications on file prior to visit.       Review of Systems:  Review of Systems   Constitutional:  Negative for chills and fever.   Respiratory:  Negative for shortness of breath and wheezing.    Cardiovascular:  Negative for chest pain and leg swelling.   Gastrointestinal:  Negative for diarrhea, nausea and vomiting.   Neurological:  Negative for dizziness, syncope and light-headedness.       Past Medical History:  Past Medical History:   Diagnosis Date    Arthritis     Cataract     Depression     Hypertension     Following with Dr. Rafa Mendes of left eye        Objective:    Vitals:  Vitals:    11/07/23 0804   BP: 116/82   Pulse: 64   Resp: 16   SpO2: 97%   Weight: 115 kg (253 lb 8.5 oz)   Height: 5' 10" (1.778 m)       Physical Exam  Vitals reviewed.   Constitutional:       General: She is not in acute distress.     Appearance: She is " well-developed.   Eyes:      General:         Right eye: No discharge.         Left eye: No discharge.      Conjunctiva/sclera: Conjunctivae normal.   Cardiovascular:      Rate and Rhythm: Normal rate and regular rhythm.   Pulmonary:      Effort: Pulmonary effort is normal. No respiratory distress.   Skin:     General: Skin is warm and dry.   Neurological:      Mental Status: She is alert and oriented to person, place, and time.   Psychiatric:         Behavior: Behavior normal.         Thought Content: Thought content normal.         Judgment: Judgment normal.         Data:  TSH 4.1 with normal FT4      Judi Gonsalez MD  Internal Medicine

## 2023-11-14 ENCOUNTER — HOSPITAL ENCOUNTER (OUTPATIENT)
Dept: RADIOLOGY | Facility: HOSPITAL | Age: 63
Discharge: HOME | End: 2023-11-14
Attending: INTERNAL MEDICINE
Payer: COMMERCIAL

## 2023-11-14 DIAGNOSIS — Z78.0 ASYMPTOMATIC POSTMENOPAUSAL STATE: ICD-10-CM

## 2023-11-14 DIAGNOSIS — Z12.31 ENCOUNTER FOR SCREENING MAMMOGRAM FOR MALIGNANT NEOPLASM OF BREAST: ICD-10-CM

## 2023-11-14 PROCEDURE — 77080 DXA BONE DENSITY AXIAL SKELETON 1 OR MORE SITES: ICD-10-PCS | Mod: 26,,, | Performed by: RADIOLOGY

## 2023-11-14 PROCEDURE — 77063 BREAST TOMOSYNTHESIS BI: CPT | Mod: 26,,, | Performed by: RADIOLOGY

## 2023-11-14 PROCEDURE — 77080 DXA BONE DENSITY AXIAL: CPT | Mod: 26,,, | Performed by: RADIOLOGY

## 2023-11-14 PROCEDURE — 77063 MAMMO DIGITAL SCREENING BILAT WITH TOMO: ICD-10-PCS | Mod: 26,,, | Performed by: RADIOLOGY

## 2023-11-14 PROCEDURE — 77080 DXA BONE DENSITY AXIAL: CPT | Mod: TC,PO

## 2023-11-14 PROCEDURE — 77067 SCR MAMMO BI INCL CAD: CPT | Mod: TC,PO

## 2023-11-14 PROCEDURE — 77067 MAMMO DIGITAL SCREENING BILAT WITH TOMO: ICD-10-PCS | Mod: 26,,, | Performed by: RADIOLOGY

## 2023-11-14 PROCEDURE — 77067 SCR MAMMO BI INCL CAD: CPT | Mod: 26,,, | Performed by: RADIOLOGY

## 2023-11-24 ENCOUNTER — OFFICE VISIT (OUTPATIENT)
Dept: FAMILY MEDICINE | Facility: CLINIC | Age: 63
End: 2023-11-24
Payer: COMMERCIAL

## 2023-11-24 VITALS
RESPIRATION RATE: 16 BRPM | HEIGHT: 70 IN | WEIGHT: 257.69 LBS | OXYGEN SATURATION: 98 % | DIASTOLIC BLOOD PRESSURE: 82 MMHG | BODY MASS INDEX: 36.89 KG/M2 | SYSTOLIC BLOOD PRESSURE: 138 MMHG | HEART RATE: 68 BPM

## 2023-11-24 DIAGNOSIS — H93.13 BILATERAL TINNITUS: Primary | ICD-10-CM

## 2023-11-24 PROCEDURE — 1159F PR MEDICATION LIST DOCUMENTED IN MEDICAL RECORD: ICD-10-PCS | Mod: CPTII,S$GLB,, | Performed by: INTERNAL MEDICINE

## 2023-11-24 PROCEDURE — 3079F DIAST BP 80-89 MM HG: CPT | Mod: CPTII,S$GLB,, | Performed by: INTERNAL MEDICINE

## 2023-11-24 PROCEDURE — 99999 PR PBB SHADOW E&M-EST. PATIENT-LVL V: CPT | Mod: PBBFAC,,, | Performed by: INTERNAL MEDICINE

## 2023-11-24 PROCEDURE — 3044F HG A1C LEVEL LT 7.0%: CPT | Mod: CPTII,S$GLB,, | Performed by: INTERNAL MEDICINE

## 2023-11-24 PROCEDURE — 3079F PR MOST RECENT DIASTOLIC BLOOD PRESSURE 80-89 MM HG: ICD-10-PCS | Mod: CPTII,S$GLB,, | Performed by: INTERNAL MEDICINE

## 2023-11-24 PROCEDURE — 3075F PR MOST RECENT SYSTOLIC BLOOD PRESS GE 130-139MM HG: ICD-10-PCS | Mod: CPTII,S$GLB,, | Performed by: INTERNAL MEDICINE

## 2023-11-24 PROCEDURE — 3044F PR MOST RECENT HEMOGLOBIN A1C LEVEL <7.0%: ICD-10-PCS | Mod: CPTII,S$GLB,, | Performed by: INTERNAL MEDICINE

## 2023-11-24 PROCEDURE — 99214 OFFICE O/P EST MOD 30 MIN: CPT | Mod: S$GLB,,, | Performed by: INTERNAL MEDICINE

## 2023-11-24 PROCEDURE — 1159F MED LIST DOCD IN RCRD: CPT | Mod: CPTII,S$GLB,, | Performed by: INTERNAL MEDICINE

## 2023-11-24 PROCEDURE — 3008F PR BODY MASS INDEX (BMI) DOCUMENTED: ICD-10-PCS | Mod: CPTII,S$GLB,, | Performed by: INTERNAL MEDICINE

## 2023-11-24 PROCEDURE — 1160F RVW MEDS BY RX/DR IN RCRD: CPT | Mod: CPTII,S$GLB,, | Performed by: INTERNAL MEDICINE

## 2023-11-24 PROCEDURE — 3008F BODY MASS INDEX DOCD: CPT | Mod: CPTII,S$GLB,, | Performed by: INTERNAL MEDICINE

## 2023-11-24 PROCEDURE — 99999 PR PBB SHADOW E&M-EST. PATIENT-LVL V: ICD-10-PCS | Mod: PBBFAC,,, | Performed by: INTERNAL MEDICINE

## 2023-11-24 PROCEDURE — 99214 PR OFFICE/OUTPT VISIT, EST, LEVL IV, 30-39 MIN: ICD-10-PCS | Mod: S$GLB,,, | Performed by: INTERNAL MEDICINE

## 2023-11-24 PROCEDURE — 3075F SYST BP GE 130 - 139MM HG: CPT | Mod: CPTII,S$GLB,, | Performed by: INTERNAL MEDICINE

## 2023-11-24 PROCEDURE — 1160F PR REVIEW ALL MEDS BY PRESCRIBER/CLIN PHARMACIST DOCUMENTED: ICD-10-PCS | Mod: CPTII,S$GLB,, | Performed by: INTERNAL MEDICINE

## 2023-11-24 NOTE — PROGRESS NOTES
Assessment and Plan:    1. Bilateral tinnitus  Discussed possible causes with patient.  No evidence of otitis media on exam today.  No other suggestive symptoms.  Recommend audiogram and ENT evaluation.  - Ambulatory referral/consult to Audiology; Future  - Ambulatory referral/consult to ENT; Future      ______________________________________________________________________  Subjective:    Chief Complaint:  Tinnitus    HPI:  Christin is a 63 y.o. year old female here for evaluation of tinnitus.     She reports that she recently started having tinnitus about 3-4 weeks ago. Came on gradually. Started in just one ear, but then has progressed to both ears and is getting louder. She denies any hearing loss. No pain in either ear. No pressure or popping sensation in either ear. She has not had any symptoms of vertigo. No recent exposures to loud sounds. No sinus complaints.     She is partially concerned because she has a family history of Meniere's disease.     Medications:  Current Outpatient Medications on File Prior to Visit   Medication Sig Dispense Refill    AA/prot/lysine/methio/vit C/B6 (A/G PRO ORAL) Take 2 tablets by mouth once daily.       ascorbic acid, vitamin C, (VITAMIN C) 1000 MG tablet Take 1,000 mg by mouth once daily.      aspirin 81 MG Chew Take 81 mg by mouth once daily.      atorvastatin (LIPITOR) 40 MG tablet Take 1 tablet (40 mg total) by mouth once daily. 90 tablet 3    carvediloL (COREG) 25 MG tablet TAKE 1 TABLET BY MOUTH TWICE A DAY WITH FOOD 180 tablet 2    microfibrillar collagen powder Apply 1 g topically as needed.      multivitamin (THERAGRAN) per tablet Take 1 tablet by mouth once daily.       omega-3 fatty acids/fish oil (FISH OIL-OMEGA-3 FATTY ACIDS) 300-1,000 mg capsule Take 4 capsules by mouth once daily.      SOOLANTRA 1 % Crea Apply topically.      SYNTHROID 125 mcg tablet Take 1 tablet (125 mcg total) by mouth before breakfast. 90 tablet 1    TRINTELLIX 10 mg Tab TAKE 1 TABLET BY  "MOUTH EVERY DAY 90 tablet 2    ubidecarenone/vitamin E mixed (COQ10  ORAL) Take by mouth once daily.      UNABLE TO FIND medication name: Jesica      UNABLE TO FIND once. Testosterone and estrogen cream      vitamin D 1000 units Tab Take 2,000 Units by mouth once daily.       zinc gluconate 50 mg tablet Take 50 mg by mouth once daily.       No current facility-administered medications on file prior to visit.       Review of Systems:  Review of Systems   Constitutional:  Negative for chills and fever.   HENT:  Negative for ear pain, hearing loss, postnasal drip, sinus pressure, sinus pain, sore throat and trouble swallowing.        Past Medical History:  Past Medical History:   Diagnosis Date    Arthritis     Cataract     Depression     Hypertension     Following with Dr. Rafa Mendes of left eye        Objective:    Vitals:  Vitals:    11/24/23 1054 11/24/23 1127   BP: (!) 142/80 138/82   Pulse: 68    Resp: 16    SpO2: 98%    Weight: 116.9 kg (257 lb 11.5 oz)    Height: 5' 10" (1.778 m)        Physical Exam  Vitals reviewed.   Constitutional:       General: She is not in acute distress.     Appearance: She is well-developed.   HENT:      Right Ear: Tympanic membrane and ear canal normal.      Left Ear: Tympanic membrane and ear canal normal.   Eyes:      General:         Right eye: No discharge.         Left eye: No discharge.      Conjunctiva/sclera: Conjunctivae normal.   Cardiovascular:      Rate and Rhythm: Normal rate and regular rhythm.   Pulmonary:      Effort: Pulmonary effort is normal. No respiratory distress.   Skin:     General: Skin is warm and dry.   Neurological:      Mental Status: She is alert and oriented to person, place, and time.   Psychiatric:         Behavior: Behavior normal.         Thought Content: Thought content normal.         Judgment: Judgment normal.         Data:  Recent CMP with unremarkable electrolytes      Judi Gonsalez MD  Internal Medicine    "

## 2023-12-01 ENCOUNTER — TELEPHONE (OUTPATIENT)
Dept: OTOLARYNGOLOGY | Facility: CLINIC | Age: 63
End: 2023-12-01
Payer: COMMERCIAL

## 2023-12-11 ENCOUNTER — CLINICAL SUPPORT (OUTPATIENT)
Dept: AUDIOLOGY | Facility: CLINIC | Age: 63
End: 2023-12-11
Payer: COMMERCIAL

## 2023-12-11 ENCOUNTER — OFFICE VISIT (OUTPATIENT)
Dept: OTOLARYNGOLOGY | Facility: CLINIC | Age: 63
End: 2023-12-11
Payer: COMMERCIAL

## 2023-12-11 VITALS — BODY MASS INDEX: 36.89 KG/M2 | HEIGHT: 70 IN | WEIGHT: 257.69 LBS

## 2023-12-11 DIAGNOSIS — H93.13 BILATERAL TINNITUS: ICD-10-CM

## 2023-12-11 DIAGNOSIS — Z01.10 NORMAL HEARING TEST OF BOTH EARS: Primary | ICD-10-CM

## 2023-12-11 DIAGNOSIS — H93.13 TINNITUS, BILATERAL: ICD-10-CM

## 2023-12-11 PROCEDURE — 99999 PR PBB SHADOW E&M-EST. PATIENT-LVL IV: CPT | Mod: PBBFAC,,, | Performed by: NURSE PRACTITIONER

## 2023-12-11 PROCEDURE — 92567 PR TYMPA2METRY: ICD-10-PCS | Mod: S$GLB,,, | Performed by: AUDIOLOGIST

## 2023-12-11 PROCEDURE — 99999 PR PBB SHADOW E&M-EST. PATIENT-LVL IV: ICD-10-PCS | Mod: PBBFAC,,, | Performed by: NURSE PRACTITIONER

## 2023-12-11 PROCEDURE — 99999 PR PBB SHADOW E&M-EST. PATIENT-LVL II: CPT | Mod: PBBFAC,,, | Performed by: AUDIOLOGIST

## 2023-12-11 PROCEDURE — 92556 PR SPEECH AUDIOMETRY, COMPLETE: ICD-10-PCS | Mod: S$GLB,,, | Performed by: AUDIOLOGIST

## 2023-12-11 PROCEDURE — 92567 TYMPANOMETRY: CPT | Mod: S$GLB,,, | Performed by: AUDIOLOGIST

## 2023-12-11 PROCEDURE — 1159F MED LIST DOCD IN RCRD: CPT | Mod: CPTII,S$GLB,, | Performed by: NURSE PRACTITIONER

## 2023-12-11 PROCEDURE — 1159F PR MEDICATION LIST DOCUMENTED IN MEDICAL RECORD: ICD-10-PCS | Mod: CPTII,S$GLB,, | Performed by: NURSE PRACTITIONER

## 2023-12-11 PROCEDURE — 99203 OFFICE O/P NEW LOW 30 MIN: CPT | Mod: S$GLB,,, | Performed by: NURSE PRACTITIONER

## 2023-12-11 PROCEDURE — 3044F PR MOST RECENT HEMOGLOBIN A1C LEVEL <7.0%: ICD-10-PCS | Mod: CPTII,S$GLB,, | Performed by: NURSE PRACTITIONER

## 2023-12-11 PROCEDURE — 92556 SPEECH AUDIOMETRY COMPLETE: CPT | Mod: S$GLB,,, | Performed by: AUDIOLOGIST

## 2023-12-11 PROCEDURE — 3008F PR BODY MASS INDEX (BMI) DOCUMENTED: ICD-10-PCS | Mod: CPTII,S$GLB,, | Performed by: NURSE PRACTITIONER

## 2023-12-11 PROCEDURE — 99999 PR PBB SHADOW E&M-EST. PATIENT-LVL II: ICD-10-PCS | Mod: PBBFAC,,, | Performed by: AUDIOLOGIST

## 2023-12-11 PROCEDURE — 3044F HG A1C LEVEL LT 7.0%: CPT | Mod: CPTII,S$GLB,, | Performed by: NURSE PRACTITIONER

## 2023-12-11 PROCEDURE — 92552 PR PURE TONE AUDIOMETRY, AIR: ICD-10-PCS | Mod: S$GLB,,, | Performed by: AUDIOLOGIST

## 2023-12-11 PROCEDURE — 1160F PR REVIEW ALL MEDS BY PRESCRIBER/CLIN PHARMACIST DOCUMENTED: ICD-10-PCS | Mod: CPTII,S$GLB,, | Performed by: NURSE PRACTITIONER

## 2023-12-11 PROCEDURE — 3008F BODY MASS INDEX DOCD: CPT | Mod: CPTII,S$GLB,, | Performed by: NURSE PRACTITIONER

## 2023-12-11 PROCEDURE — 99203 PR OFFICE/OUTPT VISIT, NEW, LEVL III, 30-44 MIN: ICD-10-PCS | Mod: S$GLB,,, | Performed by: NURSE PRACTITIONER

## 2023-12-11 PROCEDURE — 1160F RVW MEDS BY RX/DR IN RCRD: CPT | Mod: CPTII,S$GLB,, | Performed by: NURSE PRACTITIONER

## 2023-12-11 PROCEDURE — 92552 PURE TONE AUDIOMETRY AIR: CPT | Mod: S$GLB,,, | Performed by: AUDIOLOGIST

## 2023-12-11 NOTE — PATIENT INSTRUCTIONS
Tinnitus (Ringing in the Ears)  Tinnitus is the term for a noise in your ear not caused by an outside sound. The noise might be a ringing, buzzing, hissing, roaring. It can vary in pitch and may be soft or quite loud. For some people, tinnitus is a minor nuisance. But for others, the noise can make it hard to hear, work, and even sleep. When tinnitus can't be cured, a number of treatments may offer relief.  What causes tinnitus?  Loud noises, hearing loss, and ear wax can cause tinnitus. So can certain medicines. Large amounts of aspirin or caffeine are sometimes to blame. In many cases, the exact cause of tinnitus is unknown. Common medications that can cause tinnitus include antidepressants, pain medications, antianxiety meds, blood pressure meds, accutane, antibiotics, antivirals, chemotherapy, seizure meds, and bipolar meds.   How is tinnitus treated?  Identifying and removing the cause is the best way to treat tinnitus. For that reason, your healthcare provider may refer you to an otolaryngologist (ear, nose, and throat doctor). Your hearing may also be checked by an audiologist (hearing specialist). If you have hearing loss, wearing a hearing aid may help your tinnitus. When the cause can't be found, the tinnitus itself may be treated. Some of the treatments are listed below, and your healthcare provider can tell you more about them:  Avoid exposure to loud sounds, which will exacerbate tinnitus.  Wear ear protection around loud noises.  Get your blood pressure check regularly.  If it is high, see your doctor.  Check with your PCP whether labs can be done to check for anemia and hyperthyroid, as these can worsen tinnitus.   Decrease salt intake.  Avoid stimulants such as caffeine and tobacco.  Exercise daily to improve circulation. Poor circulation worsens tinnitus.   Avoid sleep deprivation. Sleep deprivation and insomnia can worsen tinnitus. Get adequate rest.  Reduce aspirin use, if possible. Aspirin as  well as NSAIDs and narcotic pain relievers can exacerbate tinnitus.   Stop worrying about the noise.  Stress worsens tinnitus. Recognize the noise as an annoyance and learn to ignore it as much as possible. Patients with higher rates of anxiety, depression, concentration, or problems sleeping may need to discuss taking something for anxiety or depression with their primary care provider.     Consider a trial of lipoflavonoids, slow-release niacin, or Arches' Tinnitus Formula (over-the-counter).  Purchase a white noise machine to mask tinnitus.  The BeavEx Tinnitus Relief marianne on your smart phone uses a combination of sounds and relaxing exercises that aim to distract your brain from focusing on tinnitus. Over time the brain learns to focus less on the tinnitus.   When no underlying pathology can be identified, an audiogram is needed to screen for hearing loss. If hearing loss is noted, hearing aids with masking technology are recommended to help relieve tinnitus.   Maskers are small devices that look like hearing aids. They emit a pleasant sound that helps cover up the ringing in your ears, similar to the technology used in noise-cancelling headphones. Hearing aids and maskers are sometimes used together.  Cognitive Behavioral Therapy (CBT), otherwise known as Tinnitus Retraining Therapy (TRT), can be done by either an audiologist or a cognitive behavioral therapist who is certified in TRT. Tinnitus retraining therapy combines biofeedback, counseling and maskers.   Medicines that treat anxiety and depression may ease tinnitus in some people.  Hypnosis or relaxation therapy may help noise seem less severe.    First-line treatment for tinnitus:  Elimination of exacerbating factors such as elevated blood pressure, high sodium intake, caffeine/stimulants, sleep deprivation/insomnia, certain medications, aspirin, exposure to loud sounds, etc. White noise is recommended as first-line treatment.    Second-line treatment  for tinnitus:  Maskers (with or without the use of a hearing aid depending on the outcome of your hearing test) and/or Cognitive Behavior Therapy (Tinnitus Retraining Therapy).  To find an audiologist or Cognitive Behavioral Therapist in your area who is certified in Tinnitus Retraining Therapy, you must contact the American Tinnitus Association at 1-126.300.2536 or www.andrew.org.    For more information  American Speech-Hearing-Language Association 242-122-8611 www.valentin.org  American Tinnitus Association [phone] www.[email or web address]  National [place] on Deafness and other Communication Disorders [phone] www.nidcd.nih.gov

## 2023-12-11 NOTE — PROGRESS NOTES
Subjective     Patient ID: Christin Caruso is a 63 y.o. female.    Chief Complaint: Tinnitus    HPI  Patient is new to ENT, referred by Dr. Gonsalez for consultation for bilateral tinnitus. Patient reports humming tinnitus AU X several months, but now it is screeching. Interferes with her ability to nap during the day. She drinks 4 large cups of caffeine every day. Patient denies sleep deprivation or insomnia. H/o goiter. She has taken synthroid since 5th grade, recent increase in dose due to elevated TSH. Patient admits that she does eat a lot of salt. She suspects poor circulation as her extremities often fall asleep. She has been taking a baby aspirin every day since acute MI 3 years ago. She does not take NSAIDs. She admits considerable life stress.      Review of Systems   Constitutional: Negative.    HENT:  Positive for tinnitus.    Eyes: Negative.    Respiratory: Negative.     Cardiovascular: Negative.    Gastrointestinal: Negative.    Musculoskeletal: Negative.    Integumentary:  Negative.   Neurological: Negative.    Hematological: Negative.    Psychiatric/Behavioral: Negative.            Objective     Physical Exam  Vitals and nursing note reviewed.   Constitutional:       General: She is not in acute distress.     Appearance: She is well-developed. She is not ill-appearing.   HENT:      Head: Normocephalic and atraumatic.      Right Ear: Hearing, tympanic membrane, ear canal and external ear normal. No middle ear effusion. Tympanic membrane is not erythematous.      Left Ear: Hearing, tympanic membrane, ear canal and external ear normal.  No middle ear effusion. Tympanic membrane is not erythematous.      Nose: Nose normal.   Eyes:      General: Lids are normal. No scleral icterus.        Right eye: No discharge.         Left eye: No discharge.   Neck:      Trachea: Trachea normal. No tracheal deviation.   Cardiovascular:      Rate and Rhythm: Normal rate.   Pulmonary:      Effort: Pulmonary effort is normal.  No respiratory distress.      Breath sounds: No stridor. No wheezing.   Musculoskeletal:         General: Normal range of motion.      Cervical back: Normal range of motion and neck supple.   Skin:     General: Skin is warm and dry.   Neurological:      Mental Status: She is alert and oriented to person, place, and time.      Coordination: Coordination normal.      Gait: Gait normal.   Psychiatric:         Attention and Perception: Attention normal.         Mood and Affect: Mood normal.         Speech: Speech normal.         Behavior: Behavior normal. Behavior is cooperative.            Assessment and Plan     1. Normal hearing test of both ears    2. Tinnitus, bilateral      Tinnitus handout given and discussed at length. Discussed elimination of exacerbating factors such as elevated blood pressure, high sodium intake, caffeine/stimulants, sleep deprivation/insomnia, certain medications, exposure to loud sounds, etc. Discussed white noise, hearing aids w/masking technology, and tinnitus retraining therapy (TRT). All questions answered.          No follow-ups on file.

## 2023-12-20 NOTE — PROGRESS NOTES
Christin Caruso was seen 12/11/23 for an audiological evaluation.      Pt reported bilateral tinnitus (left>right).      Otoscopy revealed clear view of both external ear canals and tympanic membranes.  No obstructive cerumen present in either ear canal.       Audiogram results revealed essentially normal hearing bilaterally.  Speech Reception Thresholds were 10 dBHL for the right ear and 10 dBHL for the left ear.  Word recognition scores were excellent for the right ear and excellent for the left ear.   Tympanograms were Type A for the right ear and Type A for the left ear.    Audiogram results were reviewed in detail with patient and all questions were answered. Results will be reviewed by ENT at the completion of this note.     Recommend use of hearing protection devices when in the presence of loud sounds and repeat audiogram in approx. Five years.

## 2024-01-26 ENCOUNTER — TELEPHONE (OUTPATIENT)
Dept: FAMILY MEDICINE | Facility: CLINIC | Age: 64
End: 2024-01-26
Payer: COMMERCIAL

## 2024-01-26 NOTE — TELEPHONE ENCOUNTER
----- Message from Tashia Alegria sent at 1/25/2024  3:04 PM CST -----  Regarding: advise  Contact: patient  Type: Needs Medical Advice  Who Called:  patient   Symptoms (please be specific):  wheezing   How long has patient had these symptoms:    Pharmacy name and phone #:   [id number] - [place] - [address]  [address]  [place] [address]  Phone: [phone] Fax: [phone]        Best Call Back Number: [phone]    Additional Information: needs to be advised if should come in or have something called in

## 2024-02-02 ENCOUNTER — LAB VISIT (OUTPATIENT)
Dept: LAB | Facility: HOSPITAL | Age: 64
End: 2024-02-02
Attending: INTERNAL MEDICINE
Payer: COMMERCIAL

## 2024-02-02 DIAGNOSIS — L65.9 HAIR LOSS: ICD-10-CM

## 2024-02-02 DIAGNOSIS — E03.9 HYPOTHYROIDISM, UNSPECIFIED TYPE: ICD-10-CM

## 2024-02-02 PROCEDURE — 84480 ASSAY TRIIODOTHYRONINE (T3): CPT | Performed by: INTERNAL MEDICINE

## 2024-02-02 PROCEDURE — 85025 COMPLETE CBC W/AUTO DIFF WBC: CPT | Performed by: INTERNAL MEDICINE

## 2024-02-02 PROCEDURE — 82728 ASSAY OF FERRITIN: CPT | Performed by: INTERNAL MEDICINE

## 2024-02-02 PROCEDURE — 36415 COLL VENOUS BLD VENIPUNCTURE: CPT | Mod: PO | Performed by: INTERNAL MEDICINE

## 2024-02-02 PROCEDURE — 83540 ASSAY OF IRON: CPT | Performed by: INTERNAL MEDICINE

## 2024-02-02 PROCEDURE — 84439 ASSAY OF FREE THYROXINE: CPT | Performed by: INTERNAL MEDICINE

## 2024-02-02 PROCEDURE — 84443 ASSAY THYROID STIM HORMONE: CPT | Performed by: INTERNAL MEDICINE

## 2024-02-03 LAB
BASOPHILS # BLD AUTO: 0.03 K/UL (ref 0–0.2)
BASOPHILS NFR BLD: 0.5 % (ref 0–1.9)
DIFFERENTIAL METHOD BLD: ABNORMAL
EOSINOPHIL # BLD AUTO: 0.2 K/UL (ref 0–0.5)
EOSINOPHIL NFR BLD: 2.8 % (ref 0–8)
ERYTHROCYTE [DISTWIDTH] IN BLOOD BY AUTOMATED COUNT: 13.2 % (ref 11.5–14.5)
FERRITIN SERPL-MCNC: 130 NG/ML (ref 20–300)
HCT VFR BLD AUTO: 42 % (ref 37–48.5)
HGB BLD-MCNC: 13.2 G/DL (ref 12–16)
IMM GRANULOCYTES # BLD AUTO: 0.03 K/UL (ref 0–0.04)
IMM GRANULOCYTES NFR BLD AUTO: 0.5 % (ref 0–0.5)
IRON SERPL-MCNC: 90 UG/DL (ref 30–160)
LYMPHOCYTES # BLD AUTO: 2.3 K/UL (ref 1–4.8)
LYMPHOCYTES NFR BLD: 37.6 % (ref 18–48)
MCH RBC QN AUTO: 29.3 PG (ref 27–31)
MCHC RBC AUTO-ENTMCNC: 31.4 G/DL (ref 32–36)
MCV RBC AUTO: 93 FL (ref 82–98)
MONOCYTES # BLD AUTO: 0.5 K/UL (ref 0.3–1)
MONOCYTES NFR BLD: 7.7 % (ref 4–15)
NEUTROPHILS # BLD AUTO: 3.1 K/UL (ref 1.8–7.7)
NEUTROPHILS NFR BLD: 50.9 % (ref 38–73)
NRBC BLD-RTO: 0 /100 WBC
PLATELET # BLD AUTO: 263 K/UL (ref 150–450)
PMV BLD AUTO: 11.1 FL (ref 9.2–12.9)
RBC # BLD AUTO: 4.5 M/UL (ref 4–5.4)
SATURATED IRON: 27 % (ref 20–50)
T3 SERPL-MCNC: 92 NG/DL (ref 60–180)
T4 FREE SERPL-MCNC: 0.97 NG/DL (ref 0.71–1.51)
TOTAL IRON BINDING CAPACITY: 339 UG/DL (ref 250–450)
TRANSFERRIN SERPL-MCNC: 229 MG/DL (ref 200–375)
TSH SERPL DL<=0.005 MIU/L-ACNC: 1.64 UIU/ML (ref 0.4–4)
WBC # BLD AUTO: 6.11 K/UL (ref 3.9–12.7)

## 2024-02-07 ENCOUNTER — TELEPHONE (OUTPATIENT)
Dept: RHEUMATOLOGY | Facility: CLINIC | Age: 64
End: 2024-02-07
Payer: COMMERCIAL

## 2024-02-25 NOTE — TELEPHONE ENCOUNTER
Received fax from [facility] with a copy of prescription dated 8/15/14 for pt C-Pap heated humidifier set at 9. Pt states she needs Dr. Gonsalez to write out prescription for the C-Pap set at 9 to be faxed to [facility] along with the order #  Last ov 6/12/19   left/axillae

## 2024-03-17 NOTE — TELEPHONE ENCOUNTER
----- Message from Annetta Kumar sent at 6/26/2018  8:48 AM CDT -----  Type:  Sooner Apoointment Request    Caller is requesting a sooner appointment.  Caller declined first available appointment listed below.  Caller will not accept being placed on the waitlist and is requesting a message be sent to doctor.    Name of Caller:  Self   When is the first available appointment?  09/2018  Symptoms:  NA   Best Call Back Number:  [phone]  Additional Information:  Patient was advised to schedule with the doctor, the patient requesting an earlier appointment.     No

## 2024-03-26 LAB
PAP RECOMMENDATION EXT: NORMAL
PAP SMEAR: NORMAL

## 2024-04-29 DIAGNOSIS — I10 ESSENTIAL HYPERTENSION: ICD-10-CM

## 2024-04-29 NOTE — TELEPHONE ENCOUNTER
No care due was identified.  [facility] Embedded Care Due Messages. Reference number: [id number].   4/29/2024 1:36:04 PM CDT

## 2024-04-30 RX ORDER — CARVEDILOL 25 MG/1
TABLET ORAL
Qty: 180 TABLET | Refills: 1 | Status: SHIPPED | OUTPATIENT
Start: 2024-04-30 | End: 2024-05-07

## 2024-04-30 NOTE — TELEPHONE ENCOUNTER
Christin Caruso  is requesting a refill authorization.  Brief Assessment and Rationale for Refill:  Approve     Medication Therapy Plan:         Comments:     Note composed:7:45 AM 04/30/2024

## 2024-05-07 ENCOUNTER — OFFICE VISIT (OUTPATIENT)
Dept: FAMILY MEDICINE | Facility: CLINIC | Age: 64
End: 2024-05-07
Payer: COMMERCIAL

## 2024-05-07 VITALS
HEART RATE: 74 BPM | HEIGHT: 70 IN | WEIGHT: 253.44 LBS | RESPIRATION RATE: 16 BRPM | BODY MASS INDEX: 36.28 KG/M2 | DIASTOLIC BLOOD PRESSURE: 70 MMHG | SYSTOLIC BLOOD PRESSURE: 112 MMHG

## 2024-05-07 DIAGNOSIS — E66.01 MORBID OBESITY: ICD-10-CM

## 2024-05-07 DIAGNOSIS — F32.0 MILD MAJOR DEPRESSION: ICD-10-CM

## 2024-05-07 DIAGNOSIS — K43.9 VENTRAL HERNIA WITHOUT OBSTRUCTION OR GANGRENE: Primary | ICD-10-CM

## 2024-05-07 DIAGNOSIS — L40.50 PSA (PSORIATIC ARTHRITIS): ICD-10-CM

## 2024-05-07 PROCEDURE — 3078F DIAST BP <80 MM HG: CPT | Mod: CPTII,S$GLB,, | Performed by: INTERNAL MEDICINE

## 2024-05-07 PROCEDURE — 3008F BODY MASS INDEX DOCD: CPT | Mod: CPTII,S$GLB,, | Performed by: INTERNAL MEDICINE

## 2024-05-07 PROCEDURE — 1160F RVW MEDS BY RX/DR IN RCRD: CPT | Mod: CPTII,S$GLB,, | Performed by: INTERNAL MEDICINE

## 2024-05-07 PROCEDURE — 99999 PR PBB SHADOW E&M-EST. PATIENT-LVL IV: CPT | Mod: PBBFAC,,, | Performed by: INTERNAL MEDICINE

## 2024-05-07 PROCEDURE — 1159F MED LIST DOCD IN RCRD: CPT | Mod: CPTII,S$GLB,, | Performed by: INTERNAL MEDICINE

## 2024-05-07 PROCEDURE — 99214 OFFICE O/P EST MOD 30 MIN: CPT | Mod: S$GLB,,, | Performed by: INTERNAL MEDICINE

## 2024-05-07 PROCEDURE — 3074F SYST BP LT 130 MM HG: CPT | Mod: CPTII,S$GLB,, | Performed by: INTERNAL MEDICINE

## 2024-05-07 RX ORDER — CARVEDILOL 12.5 MG/1
12.5 TABLET ORAL 2 TIMES DAILY WITH MEALS
COMMUNITY

## 2024-05-07 RX ORDER — ATORVASTATIN CALCIUM 40 MG/1
40 TABLET, FILM COATED ORAL DAILY
COMMUNITY
Start: 2024-04-17

## 2024-05-07 NOTE — PROGRESS NOTES
Assessment and Plan:    1. Ventral hernia without obstruction or gangrene  Agree with meeting with surgeon as scheduled. Discussed options and patient would prefer to get some sort of imaging in advance. Will schedule US.  - US Abdomen Limited_Hernia; Future    2. Morbid obesity  Weight stable, discussed that increased abdominal fat can worsen symptoms related to abdominal hernias.    3. PSA (psoriatic arthritis)  Has seen Rheum in the past, not having any active issues with this.    4. Mild major depression  Able to wean off of trintellix and reports that she is currently doing well without this.      ______________________________________________________________________  Subjective:    Chief Complaint:  Evaluate hernia    HPI:  Christin is a 64 y.o. year old female here to discuss an umbilical hernia.    She reports that she first noticed this a few years ago when she had been working out more. Specifically had been doing a lot of ab workouts at the time. Notices a bulge in her frontal abdomen when she pushes out. This happens consistently. It is large and seems to be getting larger. She reports that this has been becoming slightly more uncomfortable, but she is not sure if this is really changing or she is just more aware of this. She has scheduled an appointment with Dr. Ramirez to evalaute this, but wanted to know if she needs to have any imaging done before this.    For HLD, Dr. Quiroz had tried to change from atorvastatin to pravastatin, but had worse symptoms with this. She is back on atorvastatin. Never took zetia.    She had gradually weaned herself off of trintellix. She feels like she is doing well without it at this point.    Medications:  Current Outpatient Medications on File Prior to Visit   Medication Sig Dispense Refill    AA/prot/lysine/methio/vit C/B6 (A/G PRO ORAL) Take 2 tablets by mouth once daily.       aspirin 81 MG Chew Take 81 mg by mouth once daily.      atorvastatin (LIPITOR) 40 MG tablet  Take 40 mg by mouth once daily.      carvediloL (COREG) 25 MG tablet TAKE 1 TABLET BY MOUTH TWICE A DAY WITH FOOD 180 tablet 1    multivitamin (THERAGRAN) per tablet Take 1 tablet by mouth once daily.       omega-3 fatty acids/fish oil (FISH OIL-OMEGA-3 FATTY ACIDS) 300-1,000 mg capsule Take 4 capsules by mouth once daily.      SYNTHROID 125 mcg tablet Take 1 tablet (125 mcg total) by mouth before breakfast. 90 tablet 1    ubidecarenone/vitamin E mixed (COQ10  ORAL) Take by mouth once daily.      vitamin D 1000 units Tab Take 2,000 Units by mouth once daily.       ascorbic acid, vitamin C, (VITAMIN C) 1000 MG tablet Take 1,000 mg by mouth once daily. (Patient not taking: Reported on 5/7/2024)      ezetimibe (ZETIA) 10 mg tablet Take 1 tablet (10 mg total) by mouth once daily. (Patient not taking: Reported on 5/7/2024) 90 tablet 3    microfibrillar collagen powder Apply 1 g topically as needed. (Patient not taking: Reported on 5/7/2024)      pravastatin (PRAVACHOL) 20 MG tablet Take 1 tablet (20 mg total) by mouth nightly. (Patient not taking: Reported on 5/7/2024) 90 tablet 4    SOOLANTRA 1 % Crea Apply topically. (Patient not taking: Reported on 5/7/2024)      TRINTELLIX 10 mg Tab TAKE 1 TABLET BY MOUTH EVERY DAY (Patient not taking: Reported on 5/7/2024) 90 tablet 2    zinc gluconate 50 mg tablet Take 50 mg by mouth once daily. (Patient not taking: Reported on 5/7/2024)       No current facility-administered medications on file prior to visit.       Review of Systems:  Review of Systems   Constitutional:  Negative for chills and fever.   Respiratory:  Negative for shortness of breath and wheezing.    Cardiovascular:  Negative for chest pain and leg swelling.   Gastrointestinal:  Negative for abdominal pain, diarrhea, nausea and vomiting.   Neurological:  Negative for dizziness, syncope and light-headedness.       Past Medical History:  Past Medical History:   Diagnosis Date    Arthritis     Cataract      "Depression     Hypertension     Following with Dr. Rafa Mendes of left eye        Objective:    Vitals:  Vitals:    05/07/24 1407   BP: 112/70   Pulse: 74   Resp: 16   Weight: 114.9 kg (253 lb 6.7 oz)   Height: 5' 10" (1.778 m)       Physical Exam  Vitals reviewed.   Constitutional:       General: She is not in acute distress.     Appearance: She is well-developed.   Eyes:      General:         Right eye: No discharge.         Left eye: No discharge.      Conjunctiva/sclera: Conjunctivae normal.   Cardiovascular:      Rate and Rhythm: Normal rate and regular rhythm.   Pulmonary:      Effort: Pulmonary effort is normal. No respiratory distress.   Abdominal:      Comments: ventral abdominal defect noted with wide based herniation, totally reduces and is non-tender   Skin:     General: Skin is warm and dry.   Neurological:      Mental Status: She is alert and oriented to person, place, and time.   Psychiatric:         Behavior: Behavior normal.         Thought Content: Thought content normal.         Judgment: Judgment normal.         Data:  CT from 2017 comments on "There is a tiny fat containing umbilical hernia present."      Judi Gonsalez MD  Internal Medicine    "

## 2024-05-08 DIAGNOSIS — E03.9 HYPOTHYROIDISM, UNSPECIFIED TYPE: ICD-10-CM

## 2024-05-08 RX ORDER — LEVOTHYROXINE SODIUM 125 UG/1
125 TABLET ORAL
Qty: 90 TABLET | Refills: 2 | Status: SHIPPED | OUTPATIENT
Start: 2024-05-08

## 2024-05-08 NOTE — TELEPHONE ENCOUNTER
Refill Decision Note   Christin Caruso  is requesting a refill authorization.  Brief Assessment and Rationale for Refill:  Approve     Medication Therapy Plan:         Comments:     Note composed:7:43 AM 05/08/2024

## 2024-05-08 NOTE — TELEPHONE ENCOUNTER
No care due was identified.  [facility] Embedded Care Due Messages. Reference number: [id number].   5/08/2024 12:14:19 AM CDT

## 2024-06-11 ENCOUNTER — OFFICE VISIT (OUTPATIENT)
Dept: SURGERY | Facility: CLINIC | Age: 64
End: 2024-06-11
Payer: COMMERCIAL

## 2024-06-11 VITALS
WEIGHT: 261.69 LBS | DIASTOLIC BLOOD PRESSURE: 82 MMHG | SYSTOLIC BLOOD PRESSURE: 168 MMHG | HEART RATE: 66 BPM | HEIGHT: 70 IN | BODY MASS INDEX: 37.46 KG/M2 | TEMPERATURE: 97 F

## 2024-06-11 DIAGNOSIS — M62.08 DIASTASIS RECTI: Primary | ICD-10-CM

## 2024-06-11 PROCEDURE — 3008F BODY MASS INDEX DOCD: CPT | Mod: CPTII,S$GLB,, | Performed by: STUDENT IN AN ORGANIZED HEALTH CARE EDUCATION/TRAINING PROGRAM

## 2024-06-11 PROCEDURE — 99999 PR PBB SHADOW E&M-EST. PATIENT-LVL IV: CPT | Mod: PBBFAC,,, | Performed by: STUDENT IN AN ORGANIZED HEALTH CARE EDUCATION/TRAINING PROGRAM

## 2024-06-11 PROCEDURE — 1159F MED LIST DOCD IN RCRD: CPT | Mod: CPTII,S$GLB,, | Performed by: STUDENT IN AN ORGANIZED HEALTH CARE EDUCATION/TRAINING PROGRAM

## 2024-06-11 PROCEDURE — 3077F SYST BP >= 140 MM HG: CPT | Mod: CPTII,S$GLB,, | Performed by: STUDENT IN AN ORGANIZED HEALTH CARE EDUCATION/TRAINING PROGRAM

## 2024-06-11 PROCEDURE — 3079F DIAST BP 80-89 MM HG: CPT | Mod: CPTII,S$GLB,, | Performed by: STUDENT IN AN ORGANIZED HEALTH CARE EDUCATION/TRAINING PROGRAM

## 2024-06-11 PROCEDURE — 99203 OFFICE O/P NEW LOW 30 MIN: CPT | Mod: S$GLB,,, | Performed by: STUDENT IN AN ORGANIZED HEALTH CARE EDUCATION/TRAINING PROGRAM

## 2024-06-11 NOTE — PROGRESS NOTES
History & Physical    Subjective     History of Present Illness:  Patient is a 64 y.o. female presents with longstanding bulging in the epigastric region.  She was referred to me for possible hernia.    Chief Complaint   Patient presents with    Consult     VH       Review of patient's allergies indicates:   Allergen Reactions    [place] Other (See Comments) and Swelling     Tongue swells, angiodema       Current Outpatient Medications   Medication Sig Dispense Refill    AA/prot/lysine/methio/vit C/B6 (A/G PRO ORAL) Take 2 tablets by mouth once daily.       aspirin 81 MG Chew Take 81 mg by mouth once daily.      atorvastatin (LIPITOR) 40 MG tablet Take 40 mg by mouth once daily.      carvediloL (COREG) 12.5 MG tablet Take 12.5 mg by mouth 2 (two) times daily with meals.      multivitamin (THERAGRAN) per tablet Take 1 tablet by mouth once daily.       omega-3 fatty acids/fish oil (FISH OIL-OMEGA-3 FATTY ACIDS) 300-1,000 mg capsule Take 4 capsules by mouth once daily.      SYNTHROID 125 mcg tablet TAKE 1 TABLET BY MOUTH BEFORE BREAKFAST. 90 tablet 2    ubidecarenone/vitamin E mixed (COQ10  ORAL) Take by mouth once daily.      vitamin D 1000 units Tab Take 2,000 Units by mouth once daily.       ascorbic acid, vitamin C, (VITAMIN C) 1000 MG tablet Take 1,000 mg by mouth once daily. (Patient not taking: Reported on 6/11/2024)      microfibrillar collagen powder Apply 1 g topically as needed. (Patient not taking: Reported on 6/11/2024)      SOOLANTRA 1 % Crea Apply topically. (Patient not taking: Reported on 5/7/2024)      zinc gluconate 50 mg tablet Take 50 mg by mouth once daily. (Patient not taking: Reported on 6/11/2024)       No current facility-administered medications for this visit.       Past Medical History:   Diagnosis Date    Arthritis     Cataract     Depression     Hypertension     Following with Dr. Rafa Mendes of left eye      Past Surgical History:   Procedure Laterality Date     BUNIONECTOMY      right foot     SECTION      x2    COLONOSCOPY N/A 2016    Procedure: COLONOSCOPY;  Surgeon: Aj Pruitt Jr., MD;  Location: [facility] ENDO;  Service: Endoscopy;  Laterality: N/A;    CORONARY ANGIOGRAPHY N/A 2019    Procedure: ANGIOGRAM, CORONARY ARTERY;  Surgeon: Melanie Valencia MD;  Location: [facility] CATH;  Service: Cardiology;  Laterality: N/A;    LEFT HEART CATHETERIZATION N/A 2019    Procedure: Left heart cath;  Surgeon: Melanie Valencia MD;  Location: [facility] CATH;  Service: Cardiology;  Laterality: N/A;    TONSILLECTOMY       Family History   Problem Relation Name Age of Onset    Heart disease Mother      Arthritis Mother      Cataracts Mother      Diabetes Father      Heart disease Father      Cataracts Father      Diabetes Sister X3     Strabismus Sister X3     Diabetes Brother X1     Heart disease Brother X1     Strabismus Brother X1     Diabetes Paternal Aunt      Heart disease Maternal Grandfather      Cancer Neg Hx      Amblyopia Neg Hx      Blindness Neg Hx      Glaucoma Neg Hx      Macular degeneration Neg Hx      Retinal detachment Neg Hx       Social History     Tobacco Use    Smoking status: Former     Current packs/day: 0.00     Average packs/day: 0.2 packs/day for 4.0 years (0.6 ttl pk-yrs)     Types: Cigarettes     Start date: 10/1/2008     Quit date: 10/1/2012     Years since quittin.7    Smokeless tobacco: Never   Substance Use Topics    Alcohol use: Yes     Alcohol/week: 1.0 standard drink of alcohol     Types: 1 Glasses of wine per week     Comment: socially    Drug use: No        Review of Systems:  Review of Systems   Constitutional:  Negative for fever.   HENT: Negative.     Eyes: Negative.    Respiratory: Negative.     Cardiovascular: Negative.    Gastrointestinal: Negative.    Endocrine: Negative.    Genitourinary: Negative.    Musculoskeletal: Negative.    Skin: Negative.    Allergic/Immunologic: Negative.    Neurological: Negative.    Hematological:  "Negative.    Psychiatric/Behavioral: Negative.            Objective     Vital Signs (Most Recent)  Temp: 97.2 °F (36.2 °C) (06/11/24 0916)  Pulse: 66 (06/11/24 0916)  BP: (!) 168/82 (06/11/24 0916)  5' 10" (1.778 m)  118.7 kg (261 lb 11 oz)     Physical Exam:  Physical Exam  Constitutional:       General: She is not in acute distress.     Appearance: Normal appearance. She is not ill-appearing, toxic-appearing or diaphoretic.   HENT:      Head: Normocephalic.      Nose: Nose normal.   Eyes:      Conjunctiva/sclera: Conjunctivae normal.   Cardiovascular:      Rate and Rhythm: Normal rate and regular rhythm.   Pulmonary:      Effort: Pulmonary effort is normal.   Abdominal:      Palpations: Abdomen is soft.      Comments: Diastasis in the epigastric region.  No evidence of a hernia   Musculoskeletal:         General: Normal range of motion.      Cervical back: Normal range of motion.   Skin:     General: Skin is warm.   Neurological:      General: No focal deficit present.      Mental Status: She is alert.   Psychiatric:         Mood and Affect: Mood normal.           Diagnostic Results:  Old CT scan was reviewed.  No significant abdominal wall abnormalities       Assessment and Plan   64-year-old female with a diastasis recti of the upper abdomen.    PLAN:  Discuss that this is not a hernia.  No general surgical intervention is indicated.  Repair of a diastasis is considered cosmetic.  Follow up with me as needed.           "

## 2024-06-25 ENCOUNTER — LAB VISIT (OUTPATIENT)
Dept: LAB | Facility: HOSPITAL | Age: 64
End: 2024-06-25
Attending: INTERNAL MEDICINE
Payer: COMMERCIAL

## 2024-06-25 DIAGNOSIS — R74.8 ELEVATED CK: ICD-10-CM

## 2024-06-25 DIAGNOSIS — E78.5 DYSLIPIDEMIA: ICD-10-CM

## 2024-06-25 DIAGNOSIS — I10 ESSENTIAL HYPERTENSION: ICD-10-CM

## 2024-06-25 DIAGNOSIS — I25.10 CORONARY ARTERY DISEASE DUE TO CALCIFIED CORONARY LESION: ICD-10-CM

## 2024-06-25 DIAGNOSIS — E78.00 HYPERCHOLESTEREMIA: ICD-10-CM

## 2024-06-25 DIAGNOSIS — I25.84 CORONARY ARTERY DISEASE DUE TO CALCIFIED CORONARY LESION: ICD-10-CM

## 2024-06-25 LAB
ALBUMIN SERPL BCP-MCNC: 3.9 G/DL (ref 3.5–5.2)
ALP SERPL-CCNC: 73 U/L (ref 55–135)
ALT SERPL W/O P-5'-P-CCNC: 23 U/L (ref 10–44)
ANION GAP SERPL CALC-SCNC: 11 MMOL/L (ref 8–16)
AST SERPL-CCNC: 20 U/L (ref 10–40)
BILIRUB SERPL-MCNC: 1.2 MG/DL (ref 0.1–1)
BUN SERPL-MCNC: 11 MG/DL (ref 8–23)
CALCIUM SERPL-MCNC: 9.4 MG/DL (ref 8.7–10.5)
CHLORIDE SERPL-SCNC: 107 MMOL/L (ref 95–110)
CHOLEST SERPL-MCNC: 147 MG/DL (ref 120–199)
CHOLEST/HDLC SERPL: 4.9 {RATIO} (ref 2–5)
CK SERPL-CCNC: 104 U/L (ref 20–180)
CO2 SERPL-SCNC: 26 MMOL/L (ref 23–29)
CREAT SERPL-MCNC: 0.8 MG/DL (ref 0.5–1.4)
EST. GFR  (NO RACE VARIABLE): >60 ML/MIN/1.73 M^2
GLUCOSE SERPL-MCNC: 107 MG/DL (ref 70–110)
HDLC SERPL-MCNC: 30 MG/DL (ref 40–75)
HDLC SERPL: 20.4 % (ref 20–50)
LDLC SERPL CALC-MCNC: 78.4 MG/DL (ref 63–159)
NONHDLC SERPL-MCNC: 117 MG/DL
POTASSIUM SERPL-SCNC: 4.6 MMOL/L (ref 3.5–5.1)
PROT SERPL-MCNC: 6.4 G/DL (ref 6–8.4)
SODIUM SERPL-SCNC: 144 MMOL/L (ref 136–145)
TRIGL SERPL-MCNC: 193 MG/DL (ref 30–150)

## 2024-06-25 PROCEDURE — 80053 COMPREHEN METABOLIC PANEL: CPT | Performed by: INTERNAL MEDICINE

## 2024-06-25 PROCEDURE — 82550 ASSAY OF CK (CPK): CPT | Performed by: INTERNAL MEDICINE

## 2024-06-25 PROCEDURE — 80061 LIPID PANEL: CPT | Performed by: INTERNAL MEDICINE

## 2024-06-25 PROCEDURE — 36415 COLL VENOUS BLD VENIPUNCTURE: CPT | Mod: PO | Performed by: INTERNAL MEDICINE

## 2024-09-24 ENCOUNTER — OFFICE VISIT (OUTPATIENT)
Dept: FAMILY MEDICINE | Facility: CLINIC | Age: 64
End: 2024-09-24
Payer: COMMERCIAL

## 2024-09-24 VITALS
BODY MASS INDEX: 36.77 KG/M2 | DIASTOLIC BLOOD PRESSURE: 72 MMHG | HEART RATE: 62 BPM | HEIGHT: 70 IN | SYSTOLIC BLOOD PRESSURE: 136 MMHG | OXYGEN SATURATION: 95 % | WEIGHT: 256.81 LBS

## 2024-09-24 DIAGNOSIS — I10 ESSENTIAL HYPERTENSION: ICD-10-CM

## 2024-09-24 DIAGNOSIS — E66.9 OBESITY (BMI 30-39.9): ICD-10-CM

## 2024-09-24 DIAGNOSIS — F33.1 MODERATE RECURRENT MAJOR DEPRESSION: Primary | ICD-10-CM

## 2024-09-24 DIAGNOSIS — E03.9 HYPOTHYROIDISM, UNSPECIFIED TYPE: ICD-10-CM

## 2024-09-24 DIAGNOSIS — L65.9 HAIR LOSS: ICD-10-CM

## 2024-09-24 PROCEDURE — 3008F BODY MASS INDEX DOCD: CPT | Mod: CPTII,S$GLB,, | Performed by: NURSE PRACTITIONER

## 2024-09-24 PROCEDURE — 99214 OFFICE O/P EST MOD 30 MIN: CPT | Mod: S$GLB,,, | Performed by: NURSE PRACTITIONER

## 2024-09-24 PROCEDURE — 3078F DIAST BP <80 MM HG: CPT | Mod: CPTII,S$GLB,, | Performed by: NURSE PRACTITIONER

## 2024-09-24 PROCEDURE — 1159F MED LIST DOCD IN RCRD: CPT | Mod: CPTII,S$GLB,, | Performed by: NURSE PRACTITIONER

## 2024-09-24 PROCEDURE — 99999 PR PBB SHADOW E&M-EST. PATIENT-LVL V: CPT | Mod: PBBFAC,,, | Performed by: NURSE PRACTITIONER

## 2024-09-24 PROCEDURE — 3075F SYST BP GE 130 - 139MM HG: CPT | Mod: CPTII,S$GLB,, | Performed by: NURSE PRACTITIONER

## 2024-09-24 PROCEDURE — 1160F RVW MEDS BY RX/DR IN RCRD: CPT | Mod: CPTII,S$GLB,, | Performed by: NURSE PRACTITIONER

## 2024-09-24 RX ORDER — SERTRALINE HYDROCHLORIDE 25 MG/1
25 TABLET, FILM COATED ORAL DAILY
Qty: 30 TABLET | Refills: 1 | Status: SHIPPED | OUTPATIENT
Start: 2024-09-24 | End: 2024-11-23

## 2024-09-24 NOTE — PROGRESS NOTES
THIS DOCUMENT WAS MADE IN PART WITH VOICE RECOGNITION SOFTWARE.  OCCASIONALLY THIS SOFTWARE WILL MISINTERPRET WORDS OR PHRASES.     Assessment and Plan:    Moderate recurrent major depression  Comments:  Elected to start Zoloft  Plans on resuming counseling  4wk f/u  Orders:  -     sertraline (ZOLOFT) 25 MG tablet; Take 1 tablet (25 mg total) by mouth once daily.  Dispense: 30 tablet; Refill: 1    Hypothyroidism, unspecified type  Comments:  Controlled  Continue Synthroid as prescribed  Periodic monitoring TSH  Orders:  -     CBC Without Differential; Future; Expected date: 09/24/2024  -     Comprehensive Metabolic Panel; Future; Expected date: 09/24/2024  -     TSH; Future; Expected date: 09/24/2024  -     T4, Free; Future; Expected date: 09/24/2024  -     T3; Future; Expected date: 09/24/2024    Essential hypertension  Comments:  Controlled  Continue Coreg  Orders:  -     CBC Without Differential; Future; Expected date: 09/24/2024  -     Comprehensive Metabolic Panel; Future; Expected date: 09/24/2024    Hair loss  Comments:  We will recheck thyroid levels  Referral placed to dermatology  Orders:  -     Ambulatory referral/consult to Dermatology; Future; Expected date: 10/01/2024  -     TSH; Future; Expected date: 09/24/2024  -     T4, Free; Future; Expected date: 09/24/2024  -     T3; Future; Expected date: 09/24/2024    Obesity (BMI 30-39.9)  Comments:  Will check labs  Patient will check with insurance regarding weight loss medication coverage  Advised on diet and exercise  Orders:  -     Hemoglobin A1C; Future; Expected date: 09/24/2024             Visit summary:    Discussed medication options for depression and have elected to start Zoloft. We discussed how to take this medication and the likely time to effect of this medication. We discussed potential side effects and to let me know if she is having any of these side effects. Follow up in 1 month.     Recheck labs    Referral placed dermatology for further  evaluation of hair loss    Emergent conditions/ER precautions discussed.      Follow up in about 4 weeks (around 10/22/2024).   ______________________________________________________________________  Subjective:    Chief Complaint:  Follow up chronic medical conditions.    HPI:  Christin is a 64 y.o. year old female with a past medical history noted below, here to follow up chronic medical conditions.           Having some concerns regarding hair loss- progressively getting worse-  She has seen dermatologist- Norma Waldrop,  - would like referral for 2nd opinion.    She would like to get back on medication for depression. She reports having some issues with anxiety and depression- feels on edge a lot- feels like she could cry at any second. She denies SI. Trintellix made her mind race. She has tried counseling after her son passed away, which helped- may resume with previous  counselor.       Obesity and prediabetes- Recent A1c 5.4. She had taken ozempic for a short time earlier this year but had vision changes with this so she ended up stopping this. - interested in trying medication again to help with weight loss.      HTN- Taking coreg 12.5mg BID. BP has been controlled with this.     CAD- Seeing cardiology. Stent 5/19. Nuc stress with no ischemia 5/21. On statin, BB, and aspirin.    Edema:  Takes Lasix 10 mg as needed     HLD- controlled. Taking Zetia 10 mg, atorvastatin 10 mg, Omega fish oil       Psoriatic arthritis- Seeing Rheumatology.      Depression- weaned off of trintellix 10 mg daily- unable tolerate     Hypothyroidism- Taking Synthroid (brand) on 125 mcg daily. She reports today that she has been taking this regularly as prescribed.     Vitamin D deficiency- Recent lab 43, she is taking vitamin D intermittently.            Medications:  Current Outpatient Medications on File Prior to Visit   Medication Sig Dispense Refill    AA/prot/lysine/methio/vit C/B6 (A/G PRO ORAL) Take 2 tablets by mouth once  daily.       ascorbic acid, vitamin C, (VITAMIN C) 1000 MG tablet Take 1,000 mg by mouth once daily.      aspirin 81 MG Chew Take 81 mg by mouth once daily.      atorvastatin (LIPITOR) 10 MG tablet Take 1 tablet (10 mg total) by mouth once daily. 90 tablet 4    carvediloL (COREG) 12.5 MG tablet Take 12.5 mg by mouth 2 (two) times daily with meals.      ezetimibe (ZETIA) 10 mg tablet Take 1 tablet (10 mg total) by mouth once daily. 90 tablet 3    furosemide (LASIX) 20 MG tablet Take 0.5 tablets (10 mg total) by mouth daily as needed (edema). 45 tablet 3    multivitamin (THERAGRAN) per tablet Take 1 tablet by mouth once daily.       omega-3 fatty acids/fish oil (FISH OIL-OMEGA-3 FATTY ACIDS) 300-1,000 mg capsule Take 4 capsules by mouth once daily.      SOOLANTRA 1 % Crea Apply topically.      SYNTHROID 125 mcg tablet TAKE 1 TABLET BY MOUTH BEFORE BREAKFAST. 90 tablet 2    ubidecarenone/vitamin E mixed (COQ10  ORAL) Take by mouth once daily.      vitamin D 1000 units Tab Take 2,000 Units by mouth once daily.       microfibrillar collagen powder Apply 1 g topically as needed. (Patient not taking: Reported on 9/24/2024)      potassium chloride SA (K-DUR,KLOR-CON M) 10 MEQ tablet Take 1 tablet (10 mEq total) by mouth daily as needed (edema). (Patient not taking: Reported on 9/24/2024) 90 tablet 3    zinc gluconate 50 mg tablet Take 50 mg by mouth once daily. (Patient not taking: Reported on 9/24/2024)       No current facility-administered medications on file prior to visit.       Review of Systems:  Review of Systems   Constitutional:  Negative for fatigue and unexpected weight change.   HENT:  Negative for congestion, postnasal drip and rhinorrhea.    Respiratory:  Negative for cough and shortness of breath.    Gastrointestinal:  Negative for constipation, diarrhea, nausea and vomiting.   Genitourinary:  Negative for difficulty urinating and dysuria.   Musculoskeletal:  Negative for arthralgias and back pain.  "  Skin:         Hair loss   Neurological:  Negative for dizziness and headaches.   Psychiatric/Behavioral:  Positive for decreased concentration and dysphoric mood. Negative for self-injury and suicidal ideas. The patient is nervous/anxious.        Past Medical History:  Past Medical History:   Diagnosis Date    Arthritis     Cataract     Depression     Hypertension     Following with Dr. Rafa Mendes of left eye        Objective:    Vitals:  Vitals:    09/24/24 0917   BP: 136/72   Pulse: 62   SpO2: 95%   Weight: 116.5 kg (256 lb 13.4 oz)   Height: 5' 10" (1.778 m)   PainSc: 0-No pain       Physical Exam  Vitals and nursing note reviewed.   Constitutional:       General: She is not in acute distress.     Appearance: She is obese.   HENT:      Head: Normocephalic and atraumatic.   Eyes:      General: No scleral icterus.     Conjunctiva/sclera: Conjunctivae normal.   Cardiovascular:      Rate and Rhythm: Normal rate and regular rhythm.   Pulmonary:      Effort: Pulmonary effort is normal. No respiratory distress.      Breath sounds: Normal breath sounds.   Musculoskeletal:      Cervical back: Neck supple.      Right lower leg: No edema.      Left lower leg: No edema.   Lymphadenopathy:      Cervical: No cervical adenopathy.   Skin:     General: Skin is warm and dry.   Neurological:      Mental Status: She is alert and oriented to person, place, and time.   Psychiatric:         Mood and Affect: Mood normal.         Behavior: Behavior normal.         Thought Content: Thought content normal. Thought content does not include suicidal ideation. Thought content does not include suicidal plan.         Data:  CMP  Sodium   Date Value Ref Range Status   06/25/2024 144 136 - 145 mmol/L Final     Potassium   Date Value Ref Range Status   06/25/2024 4.6 3.5 - 5.1 mmol/L Final     Chloride   Date Value Ref Range Status   06/25/2024 107 95 - 110 mmol/L Final     CO2   Date Value Ref Range Status   06/25/2024 26 23 - 29 " mmol/L Final     Glucose   Date Value Ref Range Status   06/25/2024 107 70 - 110 mg/dL Final     BUN   Date Value Ref Range Status   06/25/2024 11 8 - 23 mg/dL Final     Creatinine   Date Value Ref Range Status   06/25/2024 0.8 0.5 - 1.4 mg/dL Final     Calcium   Date Value Ref Range Status   06/25/2024 9.4 8.7 - 10.5 mg/dL Final     Total Protein   Date Value Ref Range Status   06/25/2024 6.4 6.0 - 8.4 g/dL Final     Albumin   Date Value Ref Range Status   06/25/2024 3.9 3.5 - 5.2 g/dL Final     Total Bilirubin   Date Value Ref Range Status   06/25/2024 1.2 (H) 0.1 - 1.0 mg/dL Final     Comment:     For infants and newborns, interpretation of results should be based  on gestational age, weight and in agreement with clinical  observations.    Premature Infant recommended reference ranges:  Up to 24 hours.............<8.0 mg/dL  Up to 48 hours............<12.0 mg/dL  3-5 days..................<15.0 mg/dL  6-29 days.................<15.0 mg/dL       Alkaline Phosphatase   Date Value Ref Range Status   06/25/2024 73 55 - 135 U/L Final     AST   Date Value Ref Range Status   06/25/2024 20 10 - 40 U/L Final     ALT   Date Value Ref Range Status   06/25/2024 23 10 - 44 U/L Final     Anion Gap   Date Value Ref Range Status   06/25/2024 11 8 - 16 mmol/L Final     eGFR   Date Value Ref Range Status   06/25/2024 >60.0 >60 mL/min/1.73 m^2 Final         Medical history reviewed, Medications reconciled.              Ivonne Rod, FNP-C  Family Medicine

## 2024-10-01 ENCOUNTER — LAB VISIT (OUTPATIENT)
Dept: LAB | Facility: HOSPITAL | Age: 64
End: 2024-10-01
Payer: COMMERCIAL

## 2024-10-01 DIAGNOSIS — E78.00 HYPERCHOLESTEREMIA: ICD-10-CM

## 2024-10-01 DIAGNOSIS — E03.9 HYPOTHYROIDISM, UNSPECIFIED TYPE: ICD-10-CM

## 2024-10-01 DIAGNOSIS — L40.50 PSA (PSORIATIC ARTHRITIS): ICD-10-CM

## 2024-10-01 DIAGNOSIS — I25.10 CORONARY ARTERY DISEASE DUE TO CALCIFIED CORONARY LESION: ICD-10-CM

## 2024-10-01 DIAGNOSIS — L65.9 HAIR LOSS: ICD-10-CM

## 2024-10-01 DIAGNOSIS — E66.9 OBESITY (BMI 30-39.9): ICD-10-CM

## 2024-10-01 DIAGNOSIS — I25.84 CORONARY ARTERY DISEASE DUE TO CALCIFIED CORONARY LESION: ICD-10-CM

## 2024-10-01 DIAGNOSIS — R74.8 ELEVATED CK: ICD-10-CM

## 2024-10-01 DIAGNOSIS — I10 ESSENTIAL HYPERTENSION: ICD-10-CM

## 2024-10-01 DIAGNOSIS — E78.5 DYSLIPIDEMIA: ICD-10-CM

## 2024-10-01 LAB
ALBUMIN SERPL BCP-MCNC: 3.8 G/DL (ref 3.5–5.2)
ALP SERPL-CCNC: 68 U/L (ref 55–135)
ALT SERPL W/O P-5'-P-CCNC: 22 U/L (ref 10–44)
ANION GAP SERPL CALC-SCNC: 6 MMOL/L (ref 8–16)
AST SERPL-CCNC: 22 U/L (ref 10–40)
BILIRUB SERPL-MCNC: 1.1 MG/DL (ref 0.1–1)
BUN SERPL-MCNC: 18 MG/DL (ref 8–23)
CALCIUM SERPL-MCNC: 9.4 MG/DL (ref 8.7–10.5)
CHLORIDE SERPL-SCNC: 107 MMOL/L (ref 95–110)
CK SERPL-CCNC: 77 U/L (ref 20–180)
CO2 SERPL-SCNC: 30 MMOL/L (ref 23–29)
CREAT SERPL-MCNC: 0.9 MG/DL (ref 0.5–1.4)
ERYTHROCYTE [DISTWIDTH] IN BLOOD BY AUTOMATED COUNT: 12.9 % (ref 11.5–14.5)
EST. GFR  (NO RACE VARIABLE): >60 ML/MIN/1.73 M^2
ESTIMATED AVG GLUCOSE: 105 MG/DL (ref 68–131)
GLUCOSE SERPL-MCNC: 109 MG/DL (ref 70–110)
HBA1C MFR BLD: 5.3 % (ref 4–5.6)
HCT VFR BLD AUTO: 42.3 % (ref 37–48.5)
HGB BLD-MCNC: 13.7 G/DL (ref 12–16)
MCH RBC QN AUTO: 29.8 PG (ref 27–31)
MCHC RBC AUTO-ENTMCNC: 32.4 G/DL (ref 32–36)
MCV RBC AUTO: 92 FL (ref 82–98)
PLATELET # BLD AUTO: 218 K/UL (ref 150–450)
PMV BLD AUTO: 11.2 FL (ref 9.2–12.9)
POTASSIUM SERPL-SCNC: 4.9 MMOL/L (ref 3.5–5.1)
PROT SERPL-MCNC: 6.4 G/DL (ref 6–8.4)
RBC # BLD AUTO: 4.59 M/UL (ref 4–5.4)
SODIUM SERPL-SCNC: 143 MMOL/L (ref 136–145)
T3 SERPL-MCNC: 94 NG/DL (ref 60–180)
T4 FREE SERPL-MCNC: 0.98 NG/DL (ref 0.71–1.51)
TSH SERPL DL<=0.005 MIU/L-ACNC: 2.02 UIU/ML (ref 0.4–4)
WBC # BLD AUTO: 5.74 K/UL (ref 3.9–12.7)

## 2024-10-01 PROCEDURE — 82550 ASSAY OF CK (CPK): CPT | Performed by: INTERNAL MEDICINE

## 2024-10-01 PROCEDURE — 84439 ASSAY OF FREE THYROXINE: CPT | Performed by: NURSE PRACTITIONER

## 2024-10-01 PROCEDURE — 83036 HEMOGLOBIN GLYCOSYLATED A1C: CPT | Performed by: NURSE PRACTITIONER

## 2024-10-01 PROCEDURE — 84443 ASSAY THYROID STIM HORMONE: CPT | Performed by: NURSE PRACTITIONER

## 2024-10-01 PROCEDURE — 84480 ASSAY TRIIODOTHYRONINE (T3): CPT | Performed by: NURSE PRACTITIONER

## 2024-10-01 PROCEDURE — 85027 COMPLETE CBC AUTOMATED: CPT | Performed by: NURSE PRACTITIONER

## 2024-10-01 PROCEDURE — 80053 COMPREHEN METABOLIC PANEL: CPT | Performed by: NURSE PRACTITIONER

## 2024-10-02 ENCOUNTER — TELEPHONE (OUTPATIENT)
Dept: FAMILY MEDICINE | Facility: CLINIC | Age: 64
End: 2024-10-02
Payer: COMMERCIAL

## 2024-10-02 NOTE — TELEPHONE ENCOUNTER
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  She also complains of chronic cough.  Denies any chest pain or shortness a breath.  No nausea, vomiting or abdominal pain.  Complains of low back pain that has been going on for the past few weeks.  In the ED, she was found to be significantly hypotensive with leukocytosis and received over 2 L IV fluids.  UA was positive for UTI.  She underwent a CT chest and abdomen that showed no pulmonary embolism but there were multifocal small areas of opacities in the left lower and left upper lobe.  Severe left hydronephrosis secondary to 19 mm ureterovesicular junction calculus was noted.  Patient was started on broad-spectrum antibiotics, Urology was contacted and patient was taken to the operating room.  Urology was unable to pass a wire past the stone for retrieval.  IR was contacted from the operating room.  Plans for percutaneous nephrostomy tomorrow.  Due to her hypotension and her tenuous condition, she was admitted to the intensive care unit and Critical Care Services consulted for assistance.      On my evaluation the ICU, patient is resting comfortably on 3 L nasal cannula.  Denies any chest pain or shortness a breath.  Complains of low back pain.  Patient quit smoking many years ago.  Not on any supplemental oxygen at home.  Uses p.r.n. albuterol.     Impression:  --severe sepsis secondary to UTI  --UTI  Called pt, got them rescheduled due chapin is out on the 10/24.   secondary to obstructing stone in left ureterovesicular junction  --possible pneumonia  --left hydronephrosis  --renal calculi  --hypotension secondary to above  --history of CVA with right hemiparesis  --hyperlipidemia      ASSESSMENT/PLAN:  70-year-old female with recurrent UTIs and multiple other medical problems presenting with severe sepsis secondary to UTI from an obstructing left ureterovesicular stone and possible pneumonia.  Admit the patient to ICU  Patient at high risk of deterioration due to an obstructing stone.  Monitor hemodynamics closely.   Hold home antihypertensives.  Patient received sepsis fluid bolus in the ED.   Patient to be NPO for nephrostomy tube in a.m. IR was contacted by Urology.  Will start gentle IV hydration.  Monitor renal function and replace electrolytes.  Prior cultures reviewed.  Patient had the Escherichia coli, Klebsiella UTIs multiple times in the past that were sensitive to Rocephin.  Will continue Rocephin, follow up on cultures and reassess.  Hold home dose of aspirin as patient would be going for procedure tomorrow.  Continue statins  Bronchodilators, supplemental oxygen as needed.  Continue Protonix    Discussed with urology, anesthesiology and nursing staff    Goals/Disposition:  Full code.  Admit to ICU    PERTINENT DIAGNOSTICS/PROCEDURES:  CTA Chest: 12/20/2023  No evidence of pulmonary embolism.  Multifocal but small areas of left lower and left upper lobe consolidations, concerning for infectious/inflammatory process.  Persistent and probably increasing high-grade hydronephrosis with evidence of at least mild parenchymal volume loss in the partially visualized left kidney. Recommend follow-up ultrasound kidney for further characterization.  Increase in a large fat herniation through a defect in the ventral diaphragm. There is again moderate elevation of the right hemidiaphragm.  Slight prominence of the pulmonary artery to 3.1 cm, but also of a similar diameter with  respect to the adjacent a sending thoracic aorta which can be seen in the clinical setting of pulmonary hypertension.    CT Abd/Pelvis: 12/20/2023  Patchy infiltrate at the left lung base suspicious for pneumonia.  Severe left hydronephrosis due to a large ureterovesicular junction calculus measuring up to 19 mm in maximum dimension as described. This obstruction appears to be at least subacute as there is moderate parenchymal volume loss on the left, but a degree of left renal function remains as there is abundant excreted contrast within the dilated collecting system.  Left adnexal dermoid cyst measuring up to 3.3 cm. There is a right-sided 3.0 cm ovarian cyst. Further evaluation with nonemergent pelvic ultrasound is recommended.    BEST PRACTICES:  - VTE prophylaxis: SCDs  - SUP: PPI  - LDA: PIV   - Nutrition: NPO except meds  - Therapy/mobilization: As Tolerated      ================================================================    PMHx:   Past Medical History:   Diagnosis Date    ASD (atrial septal defect) 08/2016    Basal cell carcinoma     BCC of forehead, removed by Dr. Sandeep Gutierrez, back 03/07/2017    COVID-19 01/07/2022    Diplopia 2016    after acute infarct of selene cerbellar hemispheres, right 6th and left 4th nerve palsy, Dr Harry Stewart    Esophageal reflux     Essential (primary) hypertension     Fat pad 02/12/2016    Epicardial fat pad     head tremor     History of ischemic multifocal multiple vascular territories stroke 08/26/2016    8-26-16, cryptogenic, bilateral cerebellar hemisphere, vermis and small L brainstem and L ABELINO L PCA ,cryptogenic, although aortic arch athero on DENI    Malignant neoplasm (CMD)     Left Kidney, oncytoma, Dr Martin    Paroxysmal supraventricular tachycardia     Pneumonia of right lower lobe due to Streptococcus pneumoniae (CMD) 01/29/2016    PONV (postoperative nausea and vomiting)     RBBB (right bundle branch block) 01/12/2014    Recurrent UTI       Past  Surgical History:   Procedure Laterality Date    Ablation - supraventricular tachycardia  1/2014    Dr Garcia    Partial nephrectomy Left 02/02/2017    robotic partial nephrectomy, 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 90 tablet 3    albuterol 108 (90 Base) MCG/ACT inhaler Inhale 2 puffs into the lungs every 4 hours as needed for Shortness of Breath or Wheezing. 1 each 1    clonazePAM (KlonoPIN) 1 MG tablet TAKE 1 TABLET EVERY NIGHT 30 tablet 1    ibandronate (BONIVA) 150 MG tablet TAKE 1 TABLET BY MOUTH EVERY 30 DAYS 3 tablet 3    OLANZapine (ZyPREXA) 5 MG tablet Take 0.5 tablets by mouth in the morning and 0.5 tablets in the evening. 90 tablet 3    diphenhydrAMINE-PSE-APAP 12.5- MG Tab Take 10 mg by mouth.      calcium carbonate-vitamin D (CALTRATE+D) 600-400 MG-UNIT per tablet Take 1 tablet by mouth daily.      polyethylene glycol (MIRALAX) 17 GM/SCOOP powder Take 17 g by mouth as needed. Indications: Constipation       aspirin 325 MG tablet Take 1 tablet by mouth daily. Hold for 1 week. 30 tablet 1    vitamin - therapeutic multivitamins w/minerals (CENTRUM SILVER,THERA-M) Tab Take 1 tablet by mouth daily. 30 tablet 0     ALLERGIES:   Allergen Reactions    Amantadine WEAKNESS and Other (See Comments)     Stroke like symptoms. tingling    Gabapentin Other (See Comments)     respiratory depression and confusion, hypercarbic.    Lisinopril Cough      Social History     Tobacco Use    Smoking status: Former     Current packs/day: 0.00     Average packs/day: 1 pack/day for 10.0 years (10.0 ttl pk-yrs)      Types: Cigarettes     Start date: 1973     Quit date: 1983     Years since quittin.4    Smokeless tobacco: Never   Substance Use Topics    Alcohol use: Never      Family History   Problem Relation Age of Onset    Cancer, Breast Mother 88    Hypertension Mother     Heart disease Father         s/p stent, valve (mechanical).     Heart disease Sister         s/p stent    Hypertension Sister     Hyperlipidemia Sister         ================================================================    ROS:  A 14 point review of systems was done.  Pertinent positives mentioned in HPI above    No intake/output data recorded.  No intake/output data recorded.    Vital Last Value 24 Hour Range   Temperature 97.9 °F (36.6 °C) (23) Temp  Min: 97.9 °F (36.6 °C)  Max: 98.9 °F (37.2 °C)   Pulse 73 (23) Pulse  Min: 63  Max: 84   Respiratory (!) 46 (23) Resp  Min: 18  Max: 46   Non-Invasive  Blood Pressure 109/68 (23) BP  Min: 85/54  Max: 115/55   Pulse Oximetry (!) 88 % (23) SpO2  Min: 87 %  Max: 98 %   Arterial   Blood Pressure   No data recorded        Physical Exam:  General:  Elderly female, in no acute distress  Neuro:  Awake, alert, moving all extremities.  Has dysphonia that is chronic from her prior stroke  HEENT:  Normal contour, atraumatic, VLADIMIR, EOMI  Neck:  Supple  Chest:  Clear to auscultation bilaterally  Heart:  Regular in rate and rhythm  Abdomen:  Soft, nontender, nondistended, bowel sounds present  Extremities:  No edema of lower extremities  Skin:  No rash    Pertinent Reviewed: Allergies, Medications, Labs, Imaging, and Physician and Nursing Notes    ACCS Attestation       Ms. Adames is critically ill as documented above. I evaluated the patient and reviewed imaging and laboratory data. Critical care services I provided [id number], 33 minutes not including time allocated for procedures. Time spent is exclusive of time spent by other providers.    Mg  BRYANNA Foley MD  [facility]

## 2024-10-03 DIAGNOSIS — I10 ESSENTIAL HYPERTENSION: ICD-10-CM

## 2024-10-03 RX ORDER — CARVEDILOL 25 MG/1
TABLET ORAL
Qty: 180 TABLET | Refills: 1 | OUTPATIENT
Start: 2024-10-03

## 2024-10-03 NOTE — TELEPHONE ENCOUNTER
No care due was identified.  [facility] Embedded Care Due Messages. Reference number: [id number].   10/03/2024 12:23:03 AM CDT

## 2024-10-03 NOTE — TELEPHONE ENCOUNTER
Refill Decision Note   Christin Caruso  is requesting a refill authorization.  Brief Assessment and Rationale for Refill:  Quick Discontinue     Medication Therapy Plan:  PATIENT WAS DECREASED TO 12.5MG ON  05/07/24      Comments:     Note composed:12:30 PM 10/03/2024

## 2024-10-22 DIAGNOSIS — F33.1 MODERATE RECURRENT MAJOR DEPRESSION: ICD-10-CM

## 2024-10-22 RX ORDER — SERTRALINE HYDROCHLORIDE 25 MG/1
25 TABLET, FILM COATED ORAL
Qty: 90 TABLET | Refills: 1 | Status: SHIPPED | OUTPATIENT
Start: 2024-10-22

## 2024-10-22 NOTE — TELEPHONE ENCOUNTER
Wallace pt    LOV 5/7/24  Rx pending    I have seen and examined the baby and reviewed all labs. I reviewed prenatal history with mother;   My exam is documented above    Well  via ;   Routine  care;   Feeding and  care were discussed today. Parent questions were answered    Ayla Peña MD

## 2024-10-28 ENCOUNTER — OFFICE VISIT (OUTPATIENT)
Dept: FAMILY MEDICINE | Facility: CLINIC | Age: 64
End: 2024-10-28
Payer: COMMERCIAL

## 2024-10-28 VITALS
HEART RATE: 66 BPM | OXYGEN SATURATION: 98 % | BODY MASS INDEX: 36.37 KG/M2 | DIASTOLIC BLOOD PRESSURE: 72 MMHG | HEIGHT: 70 IN | WEIGHT: 254.06 LBS | SYSTOLIC BLOOD PRESSURE: 132 MMHG

## 2024-10-28 DIAGNOSIS — F33.1 MODERATE RECURRENT MAJOR DEPRESSION: Primary | ICD-10-CM

## 2024-10-28 PROCEDURE — 99999 PR PBB SHADOW E&M-EST. PATIENT-LVL IV: CPT | Mod: PBBFAC,,, | Performed by: NURSE PRACTITIONER

## 2024-10-28 PROCEDURE — 1160F RVW MEDS BY RX/DR IN RCRD: CPT | Mod: CPTII,S$GLB,, | Performed by: NURSE PRACTITIONER

## 2024-10-28 PROCEDURE — 99213 OFFICE O/P EST LOW 20 MIN: CPT | Mod: S$GLB,,, | Performed by: NURSE PRACTITIONER

## 2024-10-28 PROCEDURE — 3078F DIAST BP <80 MM HG: CPT | Mod: CPTII,S$GLB,, | Performed by: NURSE PRACTITIONER

## 2024-10-28 PROCEDURE — 3044F HG A1C LEVEL LT 7.0%: CPT | Mod: CPTII,S$GLB,, | Performed by: NURSE PRACTITIONER

## 2024-10-28 PROCEDURE — 3075F SYST BP GE 130 - 139MM HG: CPT | Mod: CPTII,S$GLB,, | Performed by: NURSE PRACTITIONER

## 2024-10-28 PROCEDURE — 1159F MED LIST DOCD IN RCRD: CPT | Mod: CPTII,S$GLB,, | Performed by: NURSE PRACTITIONER

## 2024-10-28 PROCEDURE — 3008F BODY MASS INDEX DOCD: CPT | Mod: CPTII,S$GLB,, | Performed by: NURSE PRACTITIONER

## 2024-10-28 RX ORDER — SERTRALINE HYDROCHLORIDE 25 MG/1
25 TABLET, FILM COATED ORAL DAILY
Start: 2024-10-28

## 2024-11-05 DIAGNOSIS — I10 ESSENTIAL HYPERTENSION: ICD-10-CM

## 2024-11-05 DIAGNOSIS — F32.0 MILD MAJOR DEPRESSION: ICD-10-CM

## 2024-11-05 RX ORDER — VORTIOXETINE 10 MG/1
1 TABLET, FILM COATED ORAL
Qty: 90 TABLET | Refills: 2 | OUTPATIENT
Start: 2024-11-05

## 2024-11-05 RX ORDER — CARVEDILOL 25 MG/1
TABLET ORAL
Qty: 180 TABLET | Refills: 1 | OUTPATIENT
Start: 2024-11-05

## 2024-11-05 NOTE — TELEPHONE ENCOUNTER
No care due was identified.  Health [facility] Embedded Care Due Messages. Reference number: [id number].   11/05/2024 7:42:47 AM CST

## 2024-11-05 NOTE — TELEPHONE ENCOUNTER
Refill Decision Note  Yousuf DC. Inappropriate Request     Christin Caruso  is requesting a refill authorization.  Brief Assessment and Rationale for Refill:  Quick Discontinue     Medication Therapy Plan:  prescription was discontinued on 5/7/2024 by Judi Gonsalez MD    Medication Reconciliation Completed: No   Comments:           Note composed:11:48 AM 11/05/2024

## 2024-11-20 DIAGNOSIS — Z12.31 OTHER SCREENING MAMMOGRAM: ICD-10-CM

## 2024-12-14 DIAGNOSIS — I10 ESSENTIAL HYPERTENSION: ICD-10-CM

## 2024-12-14 NOTE — TELEPHONE ENCOUNTER
No care due was identified.  Health [facility] Embedded Care Due Messages. Reference number: [id number].   12/14/2024 10:20:37 AM CST

## 2024-12-15 RX ORDER — CARVEDILOL 25 MG/1
TABLET ORAL
Qty: 180 TABLET | Refills: 1 | OUTPATIENT
Start: 2024-12-15

## 2024-12-16 NOTE — TELEPHONE ENCOUNTER
Refill Decision Note   Christin Caruso  is requesting a refill authorization.  Brief Assessment and Rationale for Refill:  Quick Discontinue     Medication Therapy Plan:  The original prescription was discontinued on 5/7/2024 by Judi Gonsalez MD.      Comments:     Note composed:11:58 PM 12/15/2024

## 2025-01-06 ENCOUNTER — LAB VISIT (OUTPATIENT)
Dept: LAB | Facility: HOSPITAL | Age: 65
End: 2025-01-06
Attending: INTERNAL MEDICINE
Payer: COMMERCIAL

## 2025-01-06 DIAGNOSIS — E78.5 DYSLIPIDEMIA: ICD-10-CM

## 2025-01-06 DIAGNOSIS — R74.8 ELEVATED CK: ICD-10-CM

## 2025-01-06 DIAGNOSIS — L40.50 PSA (PSORIATIC ARTHRITIS): ICD-10-CM

## 2025-01-06 DIAGNOSIS — I25.84 CORONARY ARTERY DISEASE DUE TO CALCIFIED CORONARY LESION: ICD-10-CM

## 2025-01-06 DIAGNOSIS — I25.10 CORONARY ARTERY DISEASE DUE TO CALCIFIED CORONARY LESION: ICD-10-CM

## 2025-01-06 DIAGNOSIS — E78.00 HYPERCHOLESTEREMIA: ICD-10-CM

## 2025-01-06 DIAGNOSIS — I10 ESSENTIAL HYPERTENSION: ICD-10-CM

## 2025-01-06 LAB
ALBUMIN SERPL BCP-MCNC: 4 G/DL (ref 3.5–5.2)
ALP SERPL-CCNC: 75 U/L (ref 40–150)
ALT SERPL W/O P-5'-P-CCNC: 27 U/L (ref 10–44)
ANION GAP SERPL CALC-SCNC: 8 MMOL/L (ref 8–16)
AST SERPL-CCNC: 21 U/L (ref 10–40)
BILIRUB SERPL-MCNC: 1.2 MG/DL (ref 0.1–1)
BUN SERPL-MCNC: 20 MG/DL (ref 8–23)
CALCIUM SERPL-MCNC: 9.5 MG/DL (ref 8.7–10.5)
CHLORIDE SERPL-SCNC: 107 MMOL/L (ref 95–110)
CO2 SERPL-SCNC: 28 MMOL/L (ref 23–29)
CREAT SERPL-MCNC: 0.7 MG/DL (ref 0.5–1.4)
EST. GFR  (NO RACE VARIABLE): >60 ML/MIN/1.73 M^2
GLUCOSE SERPL-MCNC: 105 MG/DL (ref 70–110)
POTASSIUM SERPL-SCNC: 4.9 MMOL/L (ref 3.5–5.1)
PROT SERPL-MCNC: 6.9 G/DL (ref 6–8.4)
SODIUM SERPL-SCNC: 143 MMOL/L (ref 136–145)

## 2025-01-06 PROCEDURE — 80053 COMPREHEN METABOLIC PANEL: CPT | Performed by: INTERNAL MEDICINE

## 2025-01-06 PROCEDURE — 36415 COLL VENOUS BLD VENIPUNCTURE: CPT | Mod: PO | Performed by: INTERNAL MEDICINE

## 2025-01-09 DIAGNOSIS — E03.9 HYPOTHYROIDISM, UNSPECIFIED TYPE: ICD-10-CM

## 2025-01-09 RX ORDER — LEVOTHYROXINE SODIUM 125 UG/1
125 TABLET ORAL
Qty: 90 TABLET | Refills: 1 | Status: SHIPPED | OUTPATIENT
Start: 2025-01-09

## 2025-01-09 NOTE — TELEPHONE ENCOUNTER
No care due was identified.  [facility] Embedded Care Due Messages. Reference number: [id number].   1/09/2025 12:31:23 PM CST

## 2025-01-09 NOTE — TELEPHONE ENCOUNTER
Refill Decision Note   Christin Caruso  is requesting a refill authorization.  Brief Assessment and Rationale for Refill:  Approve     Medication Therapy Plan:         Comments:     Note composed:1:05 PM 01/09/2025             Appointments     Last Visit   5/7/2024 Judi Gonsalez MD   Next Visit   Visit date not found Judi Gonsalez MD

## 2025-02-15 DIAGNOSIS — I10 ESSENTIAL HYPERTENSION: ICD-10-CM

## 2025-02-15 NOTE — TELEPHONE ENCOUNTER
No care due was identified.  [facility] Embedded Care Due Messages. Reference number: [id number].   2/15/2025 12:31:04 PM CST

## 2025-02-16 RX ORDER — CARVEDILOL 25 MG/1
25 TABLET ORAL 2 TIMES DAILY WITH MEALS
Qty: 180 TABLET | Refills: 1 | OUTPATIENT
Start: 2025-02-16

## 2025-02-17 NOTE — TELEPHONE ENCOUNTER
Refill Decision Note   Christin Caruso  is requesting a refill authorization.  Brief Assessment and Rationale for Refill:  Quick Discontinue     Medication Therapy Plan:  Medication Discontinued 5/7/24 (not pt's current therapy/dose)      Comments:     Note composed:10:33 PM 02/16/2025

## 2025-03-23 DIAGNOSIS — F33.1 MODERATE RECURRENT MAJOR DEPRESSION: ICD-10-CM

## 2025-03-23 NOTE — TELEPHONE ENCOUNTER
No care due was identified.  Health [facility] Embedded Care Due Messages. Reference number: [id number].   3/23/2025 11:32:38 AM CDT

## 2025-03-24 RX ORDER — SERTRALINE HYDROCHLORIDE 25 MG/1
25 TABLET, FILM COATED ORAL
Qty: 90 TABLET | Refills: 0 | Status: SHIPPED | OUTPATIENT
Start: 2025-03-24

## 2025-03-24 NOTE — TELEPHONE ENCOUNTER
Refill Routing Note   Medication(s) are not appropriate for processing by [facility] for the following reason(s):        No active prescription written by provider    [facility] action(s):  Defer             Appointments  past 12m or future 3m with PCP    Date Provider   Last Visit   5/7/2024 Judi Gonsalez MD   Next Visit   Visit date not found Judi Gonsalez MD   ED visits in past 90 days: 0        Note composed:7:10 AM 03/24/2025

## 2025-05-08 ENCOUNTER — LAB VISIT (OUTPATIENT)
Dept: LAB | Facility: HOSPITAL | Age: 65
End: 2025-05-08
Attending: INTERNAL MEDICINE
Payer: COMMERCIAL

## 2025-05-08 DIAGNOSIS — R00.2 PALPITATIONS: ICD-10-CM

## 2025-05-08 DIAGNOSIS — M79.671 RIGHT FOOT PAIN: ICD-10-CM

## 2025-05-08 DIAGNOSIS — E78.5 HYPERLIPIDEMIA, UNSPECIFIED HYPERLIPIDEMIA TYPE: ICD-10-CM

## 2025-05-08 DIAGNOSIS — E78.5 HYPERLIPIDEMIA, UNSPECIFIED HYPERLIPIDEMIA TYPE: Primary | ICD-10-CM

## 2025-05-08 LAB
ALBUMIN SERPL BCP-MCNC: 4.1 G/DL (ref 3.5–5.2)
ALP SERPL-CCNC: 58 UNIT/L (ref 40–150)
ALT SERPL W/O P-5'-P-CCNC: 97 UNIT/L (ref 10–44)
ANION GAP (OHS): 8 MMOL/L (ref 8–16)
AST SERPL-CCNC: 86 UNIT/L (ref 11–45)
BILIRUB SERPL-MCNC: 0.8 MG/DL (ref 0.1–1)
BUN SERPL-MCNC: 21 MG/DL (ref 8–23)
CALCIUM SERPL-MCNC: 9.2 MG/DL (ref 8.7–10.5)
CHLORIDE SERPL-SCNC: 105 MMOL/L (ref 95–110)
CHOLEST SERPL-MCNC: 159 MG/DL (ref 120–199)
CHOLEST/HDLC SERPL: 5.3 {RATIO} (ref 2–5)
CO2 SERPL-SCNC: 30 MMOL/L (ref 23–29)
CREAT SERPL-MCNC: 0.8 MG/DL (ref 0.5–1.4)
GFR SERPLBLD CREATININE-BSD FMLA CKD-EPI: >60 ML/MIN/1.73/M2
GLUCOSE SERPL-MCNC: 102 MG/DL (ref 70–110)
HDLC SERPL-MCNC: 30 MG/DL (ref 40–75)
HDLC SERPL: 18.9 % (ref 20–50)
LDLC SERPL CALC-MCNC: 92.6 MG/DL (ref 63–159)
NONHDLC SERPL-MCNC: 129 MG/DL
POTASSIUM SERPL-SCNC: 5 MMOL/L (ref 3.5–5.1)
PROT SERPL-MCNC: 6.9 GM/DL (ref 6–8.4)
SODIUM SERPL-SCNC: 143 MMOL/L (ref 136–145)
T3FREE SERPL-MCNC: 2.6 PG/ML (ref 2.3–4.2)
T4 FREE SERPL-MCNC: 0.98 NG/DL (ref 0.71–1.51)
TRIGL SERPL-MCNC: 182 MG/DL (ref 30–150)
TSH SERPL-ACNC: 2.36 UIU/ML (ref 0.4–4)

## 2025-05-08 PROCEDURE — 84481 FREE ASSAY (FT-3): CPT

## 2025-05-08 PROCEDURE — 80061 LIPID PANEL: CPT

## 2025-05-08 PROCEDURE — 84439 ASSAY OF FREE THYROXINE: CPT

## 2025-05-08 PROCEDURE — 84443 ASSAY THYROID STIM HORMONE: CPT

## 2025-05-08 PROCEDURE — 80053 COMPREHEN METABOLIC PANEL: CPT

## 2025-05-08 PROCEDURE — 36415 COLL VENOUS BLD VENIPUNCTURE: CPT | Mod: PO

## 2025-05-14 ENCOUNTER — HOSPITAL ENCOUNTER (OUTPATIENT)
Dept: RADIOLOGY | Facility: HOSPITAL | Age: 65
Discharge: HOME | End: 2025-05-14
Attending: EMERGENCY MEDICINE
Payer: COMMERCIAL

## 2025-05-14 ENCOUNTER — OFFICE VISIT (OUTPATIENT)
Dept: FAMILY MEDICINE | Facility: CLINIC | Age: 65
End: 2025-05-14
Payer: COMMERCIAL

## 2025-05-14 VITALS
DIASTOLIC BLOOD PRESSURE: 86 MMHG | WEIGHT: 248.25 LBS | SYSTOLIC BLOOD PRESSURE: 128 MMHG | HEART RATE: 56 BPM | HEIGHT: 70 IN | OXYGEN SATURATION: 97 % | BODY MASS INDEX: 35.54 KG/M2

## 2025-05-14 DIAGNOSIS — M79.671 RIGHT FOOT PAIN: ICD-10-CM

## 2025-05-14 DIAGNOSIS — M79.674 PAIN IN RIGHT TOE(S): Primary | ICD-10-CM

## 2025-05-14 PROCEDURE — 3074F SYST BP LT 130 MM HG: CPT | Mod: CPTII,S$GLB,, | Performed by: EMERGENCY MEDICINE

## 2025-05-14 PROCEDURE — 99999 PR PBB SHADOW E&M-EST. PATIENT-LVL IV: CPT | Mod: PBBFAC,,, | Performed by: EMERGENCY MEDICINE

## 2025-05-14 PROCEDURE — 3008F BODY MASS INDEX DOCD: CPT | Mod: CPTII,S$GLB,, | Performed by: EMERGENCY MEDICINE

## 2025-05-14 PROCEDURE — 73630 X-RAY EXAM OF FOOT: CPT | Mod: 26,RT,, | Performed by: RADIOLOGY

## 2025-05-14 PROCEDURE — 1159F MED LIST DOCD IN RCRD: CPT | Mod: CPTII,S$GLB,, | Performed by: EMERGENCY MEDICINE

## 2025-05-14 PROCEDURE — 73630 X-RAY EXAM OF FOOT: CPT | Mod: TC,FY,PO,RT

## 2025-05-14 PROCEDURE — 3079F DIAST BP 80-89 MM HG: CPT | Mod: CPTII,S$GLB,, | Performed by: EMERGENCY MEDICINE

## 2025-05-14 PROCEDURE — 99213 OFFICE O/P EST LOW 20 MIN: CPT | Mod: S$GLB,,, | Performed by: EMERGENCY MEDICINE

## 2025-05-14 NOTE — PATIENT INSTRUCTIONS
Dominick Taping:  Tape the fractured toe to the adjacent toe (typically the third toe)  Use gauze or padding between toes to prevent skin breakdown  Stiff-soled or Post-operative Shoe:  Helps protect the toe and minimize motion during walking  Partial Weight-Bearing:

## 2025-05-14 NOTE — PROGRESS NOTES
Name: Christin Caruso  MRN: [id number]  : 1960  PCP: Judi Gonsalez MD    Subjective   Christin Caruso is here today right toes 4/5 pain.        Review of Systems   Constitutional: Negative.    HENT: Negative.     Eyes: Negative.    Respiratory: Negative.     Cardiovascular: Negative.    Gastrointestinal: Negative.    Endocrine: Negative.    Genitourinary: Negative.    Musculoskeletal: Negative.         Toes pain   Allergic/Immunologic: Negative.    Neurological: Negative.    Hematological: Negative.    Psychiatric/Behavioral: Negative.     All other systems reviewed and are negative.      Problem List[1]    Health Maintenance Due   Topic Date Due    Pneumococcal Vaccines (Age 50+) (1 of 2 - PCV) Never done    High Dose Statin  Never done    Shingles Vaccine (1 of 2) Never done    Cervical Cancer Screening  2023    TETANUS VACCINE  10/01/2023       Objective   Vitals:    25 0940   BP: 128/86   Pulse: (!) 56       Physical Exam  Vitals and nursing note reviewed.   Constitutional:       General: She is not in acute distress.     Appearance: Normal appearance. She is well-developed.   HENT:      Head: Normocephalic and atraumatic.      Right Ear: External ear normal.      Left Ear: External ear normal.      Mouth/Throat:      Mouth: Mucous membranes are moist.   Eyes:      General: Lids are normal. Gaze aligned appropriately.      Extraocular Movements: Extraocular movements intact.      Conjunctiva/sclera: Conjunctivae normal.      Pupils: Pupils are equal, round, and reactive to light.   Cardiovascular:      Rate and Rhythm: Normal rate and regular rhythm.      Heart sounds: No murmur heard.     No friction rub. No gallop.   Pulmonary:      Effort: Pulmonary effort is normal. No respiratory distress.      Breath sounds: Normal breath sounds and air entry. No wheezing, rhonchi or rales.   Abdominal:      General: Abdomen is flat. There is no distension.   Musculoskeletal:         General: No swelling  or deformity.      Cervical back: Full passive range of motion without pain, normal range of motion and neck supple.      Right lower leg: No edema.      Left lower leg: No edema.      Comments: Right toes 4/5: moderate pain with manipulation, no obvious fracture, neurovascularly intact distally    Skin:     General: Skin is warm and dry.      Coloration: Skin is not jaundiced.   Neurological:      General: No focal deficit present.      Mental Status: She is alert and oriented to person, place, and time. Mental status is at baseline.      GCS: GCS eye subscore is 4. GCS verbal subscore is 5. GCS motor subscore is 6.   Psychiatric:         Attention and Perception: Attention and perception normal.         Mood and Affect: Mood normal.         Speech: Speech normal.         Behavior: Behavior normal. Behavior is cooperative.         Thought Content: Thought content normal.         Cognition and Memory: Cognition normal.         Judgment: Judgment normal.         Assessment & Plan  Pain in right toe(s)  Patient relates stumbling as she left the shower 4 days ago with resulting pain to toes 4/5.  Able to ambulate but with pain.  Swelling and discoloration have improved.    X ray appears to demonstrate a minimally displaced fourth proximal phalanx fracture.  We will marcia tape the 3rd and 4 th phalanxs together and treat conservatively.    I dont believe Ortho is needed.             Follow up  prn    Patient is encouraged to contact me at anytime via [employer] marianne or my office for any needs.    Juventino Phillip MD  05/14/2025    DISCLAIMER: This note was prepared with Transifex Direct voice recognition transcription software. Garbled syntax, mangled pronouns, and other bizarre constructions may be attributed to that software system.  I attest to having reviewed and edited the generated note for accuracy, though some syntax or spelling errors may persist. Please contact the author of this note for any  clarification.         [1]   Patient Active Problem List  Diagnosis    Acquired hypothyroidism    Generalized osteoarthritis    Essential hypertension    Mild major depression    Osteoarthritis of fingers of hands, bilateral    Right ureteral stone    Frequent headaches    Pain in right foot - Right Foot    Predislocation syndrome of metatarsophalangeal joint of right foot - Right Foot    Injury of plantar plate of right foot    Subluxation of metatarsal - Right Foot    Chest pain    PSA (psoriatic arthritis)    Psoriasis    Anxiety    Coronary artery disease, s/p stent mid RCA 4.0 x 40 mm ORSIRO, prox-mid LCx 3.0 x 40 ORSIRO, 5/19    Hypercholesteremia    Morbid obesity    Dyslipidemia    Obstructive sleep apnea    Carpal tunnel syndrome of right wrist    Dizziness    Elevated CK    Moderate recurrent major depression

## 2025-05-27 ENCOUNTER — OFFICE VISIT (OUTPATIENT)
Dept: ENDOCRINOLOGY | Facility: CLINIC | Age: 65
End: 2025-05-27
Payer: COMMERCIAL

## 2025-05-27 VITALS
SYSTOLIC BLOOD PRESSURE: 146 MMHG | DIASTOLIC BLOOD PRESSURE: 80 MMHG | WEIGHT: 251.19 LBS | OXYGEN SATURATION: 95 % | HEIGHT: 70 IN | HEART RATE: 61 BPM | BODY MASS INDEX: 35.96 KG/M2

## 2025-05-27 DIAGNOSIS — E03.9 HYPOTHYROIDISM, UNSPECIFIED TYPE: ICD-10-CM

## 2025-05-27 DIAGNOSIS — E66.01 MORBID OBESITY: ICD-10-CM

## 2025-05-27 DIAGNOSIS — L65.9 HAIR LOSS: ICD-10-CM

## 2025-05-27 DIAGNOSIS — E03.9 HYPOTHYROIDISM, UNSPECIFIED TYPE: Primary | ICD-10-CM

## 2025-05-27 PROCEDURE — 99204 OFFICE O/P NEW MOD 45 MIN: CPT | Mod: S$GLB,,, | Performed by: PHYSICIAN ASSISTANT

## 2025-05-27 PROCEDURE — 1159F MED LIST DOCD IN RCRD: CPT | Mod: CPTII,S$GLB,, | Performed by: PHYSICIAN ASSISTANT

## 2025-05-27 PROCEDURE — 3008F BODY MASS INDEX DOCD: CPT | Mod: CPTII,S$GLB,, | Performed by: PHYSICIAN ASSISTANT

## 2025-05-27 PROCEDURE — 3077F SYST BP >= 140 MM HG: CPT | Mod: CPTII,S$GLB,, | Performed by: PHYSICIAN ASSISTANT

## 2025-05-27 PROCEDURE — 3079F DIAST BP 80-89 MM HG: CPT | Mod: CPTII,S$GLB,, | Performed by: PHYSICIAN ASSISTANT

## 2025-05-27 PROCEDURE — 99999 PR PBB SHADOW E&M-EST. PATIENT-LVL IV: CPT | Mod: PBBFAC,,, | Performed by: PHYSICIAN ASSISTANT

## 2025-05-27 RX ORDER — DEXAMETHASONE 1 MG/1
TABLET ORAL
Qty: 1 TABLET | Refills: 0 | Status: SHIPPED | OUTPATIENT
Start: 2025-05-27

## 2025-05-27 RX ORDER — LEVOTHYROXINE SODIUM 112 UG/1
CAPSULE ORAL
Qty: 90 CAPSULE | Refills: 3 | Status: SHIPPED | OUTPATIENT
Start: 2025-05-27 | End: 2025-05-28

## 2025-05-27 NOTE — PROGRESS NOTES
CHIEF COMPLAINT: Hypothyroidism  65 y.o.  being seen as a f/u. Last seen in  by Dr. Head. On synthroid 112 mcg. Changed dose ~1 month ago. On Brandname. Starting taking old dose at home because she was having palpitations. States that she has joint pain. States that she is losing hair for the last 5 years. Saw derm and given ketoconazole shampoo. Taking OTC Vit D 1000 daily. States her son passed away ~5 years ago.    +hair loss, dry skin, anxiety, insomnia, mental fog, heat/cold intolerance, sweating.   No palpitations, tremors, diarrhea, wt changes.     +facial hair,   No acne or muscle weakness.    PAST MEDICAL HISTORY: Hx of HTN, hypothyroidism, MONIKA (Using CPAP).      PAST SURGICAL HISTORY:      SOCIAL HISTORY: No T/A     FAMILY HISTORY: DM2. Heart disease, hypothyroidism      MEDICATIONS/ALLERGIES: The patient's MedCard has been updated and reviewed.       Wt Readings from Last 10 Encounters:   25 114 kg (251 lb 3.4 oz)   25 112.6 kg (248 lb 3.8 oz)   25 114.6 kg (252 lb 9.6 oz)   24 113.4 kg (250 lb)   10/28/24 115.2 kg (254 lb 1.3 oz)   10/02/24 115.5 kg (254 lb 10.1 oz)   24 116.5 kg (256 lb 13.4 oz)   24 116.4 kg (256 lb 9.6 oz)   24 118.7 kg (261 lb 11 oz)   24 114.9 kg (253 lb 6.7 oz)      ROS:   Constitutional: She has gained some weight  Eyes: No recent visual changes  ENT: No dysphagia  Cardiovascular: Denies current anginal symptoms  Respiratory: Denies current respiratory difficulty  Gastrointestinal: No N/V  GenitoUrinary - No dysuria  Skin: No new skin rash  Neurologic: No focal neurologic complaints  Remainder ROS negative      PE:   GENERAL: Well developed, well nourished.  ENT: Nares patent, oropharynx clear, mucosa pink,   NECK: Supple, trachea midline, thyroid is firm and palpable but no nodules  CHEST: Resp even and unlabored, CTA bilateral.  CARDIAC: RRR, S1, S2 heard, no murmurs, rubs, S3, or S4     Lab Results   Component Value  Date    TSH 2.357 05/08/2025    A6AZDUC 94 10/01/2024    FREET4 0.98 05/08/2025 12/26/18  TSH 5,3  FT3 3.0  Glu 111  Insulin 57 (Done after eating)     ASSESSMENT/PLAN:  1. Hypothyroidism, unspecified type  TIROSINT 112 mcg Cap    TSH    T4, Free    TSH    T4, Free      2. Hair loss  Cortisol    ACTH    dexAMETHasone (DECADRON) 1 MG Tab      3. Morbid obesity           1. Hypothyroidism- TSH wnl. Will try Tirosint to see if this helps with hair loss. Start tirosint 112 mcg qd.     2. Hair Loss-will obtain DMST.      3. Obesity-Body mass index is 36.05 kg/m². -obtain DMST. Increase exercise to 30 min qd.     FOLLOWUP:  Acth, cortisol-dexamethasone test  Tfts in six weeks  F/u in 6 mths w/ labs prior -tfts

## 2025-05-28 ENCOUNTER — PATIENT OUTREACH (OUTPATIENT)
Dept: ADMINISTRATIVE | Facility: HOSPITAL | Age: 65
End: 2025-05-28
Payer: COMMERCIAL

## 2025-05-28 RX ORDER — LEVOTHYROXINE SODIUM 112 UG/1
112 TABLET ORAL
Qty: 30 TABLET | Refills: 11 | Status: SHIPPED | OUTPATIENT
Start: 2025-05-28 | End: 2026-05-28

## 2025-05-28 NOTE — TELEPHONE ENCOUNTER
Requesting alterative refill for Tirosint     Last visit 5/28/205   Next visit  11/18/2025  Please see pharmacy note

## 2025-06-05 ENCOUNTER — LAB VISIT (OUTPATIENT)
Dept: LAB | Facility: HOSPITAL | Age: 65
End: 2025-06-05
Attending: PHYSICIAN ASSISTANT
Payer: COMMERCIAL

## 2025-06-05 DIAGNOSIS — L65.9 HAIR LOSS: ICD-10-CM

## 2025-06-05 LAB — CORTIS SERPL-MCNC: 5.8 UG/DL

## 2025-06-05 PROCEDURE — 82533 TOTAL CORTISOL: CPT

## 2025-06-05 PROCEDURE — 82024 ASSAY OF ACTH: CPT

## 2025-06-05 PROCEDURE — 36415 COLL VENOUS BLD VENIPUNCTURE: CPT | Mod: PO

## 2025-06-10 ENCOUNTER — RESULTS FOLLOW-UP (OUTPATIENT)
Dept: ENDOCRINOLOGY | Facility: CLINIC | Age: 65
End: 2025-06-10

## 2025-06-10 ENCOUNTER — PATIENT MESSAGE (OUTPATIENT)
Dept: ENDOCRINOLOGY | Facility: CLINIC | Age: 65
End: 2025-06-10
Payer: COMMERCIAL

## 2025-06-10 DIAGNOSIS — R79.89 HIGH SERUM CORTISOL: Primary | ICD-10-CM

## 2025-06-10 LAB — ACTH (OHS): <5 PG/ML

## 2025-06-16 ENCOUNTER — LAB VISIT (OUTPATIENT)
Dept: LAB | Facility: HOSPITAL | Age: 65
End: 2025-06-16
Payer: COMMERCIAL

## 2025-06-16 DIAGNOSIS — R79.89 ELEVATED LIVER FUNCTION TESTS: Primary | ICD-10-CM

## 2025-06-16 DIAGNOSIS — R79.89 HIGH SERUM CORTISOL: ICD-10-CM

## 2025-06-16 LAB
ALBUMIN SERPL BCP-MCNC: 4.1 G/DL (ref 3.5–5.2)
ALP SERPL-CCNC: 69 UNIT/L (ref 40–150)
ALT SERPL W/O P-5'-P-CCNC: 30 UNIT/L (ref 10–44)
ANION GAP (OHS): 10 MMOL/L (ref 8–16)
AST SERPL-CCNC: 25 UNIT/L (ref 11–45)
BILIRUB SERPL-MCNC: 0.8 MG/DL (ref 0.1–1)
BUN SERPL-MCNC: 19 MG/DL (ref 8–23)
CALCIUM SERPL-MCNC: 9 MG/DL (ref 8.7–10.5)
CHLORIDE SERPL-SCNC: 102 MMOL/L (ref 95–110)
CO2 SERPL-SCNC: 29 MMOL/L (ref 23–29)
CREAT SERPL-MCNC: 0.7 MG/DL (ref 0.5–1.4)
GFR SERPLBLD CREATININE-BSD FMLA CKD-EPI: >60 ML/MIN/1.73/M2
GLUCOSE SERPL-MCNC: 111 MG/DL (ref 70–110)
POTASSIUM SERPL-SCNC: 4.2 MMOL/L (ref 3.5–5.1)
PROT SERPL-MCNC: 6.7 GM/DL (ref 6–8.4)
SODIUM SERPL-SCNC: 141 MMOL/L (ref 136–145)

## 2025-06-16 PROCEDURE — 36415 COLL VENOUS BLD VENIPUNCTURE: CPT | Mod: PO

## 2025-06-16 PROCEDURE — 80053 COMPREHEN METABOLIC PANEL: CPT

## 2025-06-16 PROCEDURE — 82530 CORTISOL FREE: CPT

## 2025-06-18 ENCOUNTER — LAB VISIT (OUTPATIENT)
Dept: LAB | Facility: HOSPITAL | Age: 65
End: 2025-06-18
Attending: PHYSICIAN ASSISTANT
Payer: COMMERCIAL

## 2025-06-18 ENCOUNTER — OFFICE VISIT (OUTPATIENT)
Dept: FAMILY MEDICINE | Facility: CLINIC | Age: 65
End: 2025-06-18
Payer: COMMERCIAL

## 2025-06-18 VITALS
BODY MASS INDEX: 35 KG/M2 | DIASTOLIC BLOOD PRESSURE: 72 MMHG | OXYGEN SATURATION: 96 % | TEMPERATURE: 99 F | HEIGHT: 70 IN | RESPIRATION RATE: 16 BRPM | WEIGHT: 244.5 LBS | HEART RATE: 60 BPM | SYSTOLIC BLOOD PRESSURE: 120 MMHG

## 2025-06-18 DIAGNOSIS — R79.89 HIGH SERUM CORTISOL: ICD-10-CM

## 2025-06-18 DIAGNOSIS — L40.50 PSA (PSORIATIC ARTHRITIS): ICD-10-CM

## 2025-06-18 DIAGNOSIS — G72.0 STATIN MYOPATHY: ICD-10-CM

## 2025-06-18 DIAGNOSIS — T46.6X5A STATIN MYOPATHY: ICD-10-CM

## 2025-06-18 DIAGNOSIS — F33.1 MODERATE RECURRENT MAJOR DEPRESSION: Primary | ICD-10-CM

## 2025-06-18 PROCEDURE — 82533 TOTAL CORTISOL: CPT | Mod: 91

## 2025-06-18 PROCEDURE — 99999 PR PBB SHADOW E&M-EST. PATIENT-LVL IV: CPT | Mod: PBBFAC,,, | Performed by: INTERNAL MEDICINE

## 2025-06-18 RX ORDER — ESCITALOPRAM OXALATE 10 MG/1
10 TABLET ORAL DAILY
Qty: 90 TABLET | Refills: 1 | Status: SHIPPED | OUTPATIENT
Start: 2025-06-18 | End: 2026-06-18

## 2025-06-18 RX ORDER — HYDROCHLOROTHIAZIDE 25 MG/1
25 TABLET ORAL DAILY
COMMUNITY
Start: 2025-05-28

## 2025-06-18 NOTE — PROGRESS NOTES
Assessment and Plan:    1. Moderate recurrent major depression (Primary)  Discussed options with patient in detail.  We will stop sertraline and wait a couple weeks to see if the tinnitus improves.  Can start Lexapro after that if needed for mood.  Advised to let me know what she decides.  - EScitalopram oxalate (LEXAPRO) 10 MG tablet; Take 1 tablet (10 mg total) by mouth once daily.  Dispense: 90 tablet; Refill: 1    2. Statin myopathy  Unable to tolerate multiple statins due to muscle pain.  Planning to follow up with her cardiologist to discuss alternatives.  Discussed PCSK9 inhibitors today.    3. PSA (psoriatic arthritis)  Has not seen Rheumatology in several years.  No new current complaints.      ______________________________________________________________________    Subjective:    Chief Complaint:  Annual exam    HPI:  Christin is a 65 y.o. year old patient here for annual exam.     She reports that she has been weaning off of sertraline as she had read it could be a cause of the tinnitus that she has had for the past two years.  She has been on Zoloft intermittently since 2015, but consistently since 2020 at this point.  She is doing well with weaning off of this, no significant worsening of symptoms she has been on 12.5 mg daily for at least the last two weeks and is planning to stop it soon.  Wanted to talk about alternatives.  She had previously done well on Lexapro but this was stopped due to an interaction with the medication she is no longer taking.    Seeing Cardiology for HTN, HLD, and CAD. Taking coreg 12.5 mg BID, HCTZ 25 mg daily. Intolerant of all statins even low dose. Prescribed bempedoic acid/zetia at last visit with cardiologist, reports that her LFTs increased with this so it was stopped, LFTs normalized. Planning to follow up to discuss options for cholesterol medications. BP has been controlled at home.     Currently being worked up by Endo after abnormal DMST.     Taking levothyroxine  112 mcg daily for hypothyroidism. Dose managed by Endo NP.     Seeing Dr. Gabriel for Gyn.     She has been using ketoconazole shampoo from Derm.     Past Medical History:  Past Medical History:   Diagnosis Date    Arthritis     Cataract     Coronary artery disease, s/p stent mid RCA 4.0 x 40 mm ORSIRO, prox-mid LCx 3.0 x 40 ORSIRO, 2019    Depression     Essential hypertension 2015    Hypercholesteremia 2019    Hypertension     Following with Dr. Rafa Mendes of left eye        Past Surgical History:  Past Surgical History:   Procedure Laterality Date    BUNIONECTOMY      right foot     SECTION      x2    COLONOSCOPY N/A 2016    Procedure: COLONOSCOPY;  Surgeon: Aj Pruitt Jr., MD;  Location: [facility] ENDO;  Service: Endoscopy;  Laterality: N/A;    CORONARY ANGIOGRAPHY N/A 2019    Procedure: ANGIOGRAM, CORONARY ARTERY;  Surgeon: Melanie Valencia MD;  Location: [facility] CATH;  Service: Cardiology;  Laterality: N/A;    LEFT HEART CATHETERIZATION N/A 2019    Procedure: Left heart cath;  Surgeon: Melanie Valencia MD;  Location: [facility] CATH;  Service: Cardiology;  Laterality: N/A;    TONSILLECTOMY         Family History:  Family History   Problem Relation Name Age of Onset    Heart disease Mother      Arthritis Mother      Cataracts Mother      Diabetes Father      Heart disease Father      Cataracts Father      Diabetes Sister X3     Strabismus Sister X3     Diabetes Brother X1     Heart disease Brother X1     Strabismus Brother X1     Diabetes Paternal Aunt      Heart disease Maternal Grandfather      Cancer Neg Hx      Amblyopia Neg Hx      Blindness Neg Hx      Glaucoma Neg Hx      Macular degeneration Neg Hx      Retinal detachment Neg Hx         Social History:  Social History     Socioeconomic History    Marital status:    Tobacco Use    Smoking status: Former     Current packs/day: 0.00     Average packs/day: 0.2 packs/day for 4.0 years (0.6 ttl pk-yrs)     Types:  Cigarettes     Start date: 10/1/2008     Quit date: 10/1/2012     Years since quittin.7     Passive exposure: Past    Smokeless tobacco: Never   Substance and Sexual Activity    Alcohol use: Yes     Alcohol/week: 1.0 standard drink of alcohol     Types: 1 Glasses of wine per week     Comment: socially    Drug use: No    Sexual activity: Yes     Partners: Male     Social Drivers of Health     Financial Resource Strain: Low Risk  (1/10/2025)    Overall Financial Resource Strain (CARDIA)     Difficulty of Paying Living Expenses: Not hard at all   Food Insecurity: No Food Insecurity (1/10/2025)    Hunger Vital Sign     Worried About Running Out of Food in the Last Year: Never true     Ran Out of Food in the Last Year: Never true   Physical Activity: Inactive (1/10/2025)    Exercise Vital Sign     Days of Exercise per Week: 0 days     Minutes of Exercise per Session: 0 min   Stress: No Stress Concern Present (1/10/2025)    [employer] - Occupational Stress Questionnaire     Feeling of Stress : Only a little   Housing Stability: Unknown (1/10/2025)    Housing Stability Vital Sign     Unable to Pay for Housing in the Last Year: No       Medications:  Medications Ordered Prior to Encounter[1]    Allergies:  Creighton and Statins-hmg-coa reductase inhibitors    Immunizations:  Immunization History   Administered Date(s) Administered    Hepatitis A / Hepatitis B 2019    Influenza 10/01/2013    Influenza - Quadrivalent 2014    Influenza - Trivalent - Afluria, Fluzone MDV 10/01/2013    Influenza Split 10/01/2013, 10/01/2013    MMR 2019    Tdap 10/01/2013    Typhoid - Live 2019       Review of Systems:  Review of Systems   Constitutional:  Negative for chills and fever.   HENT:  Positive for tinnitus.    Respiratory:  Negative for shortness of breath and wheezing.    Cardiovascular:  Negative for chest pain and leg swelling.   Gastrointestinal:  Negative for diarrhea, nausea  "and vomiting.   Neurological:  Negative for dizziness, syncope and light-headedness.       Objective:    Vitals:  Vitals:    06/18/25 0846   BP: 120/72   Pulse: 60   Resp: 16   Temp: 98.5 °F (36.9 °C)   TempSrc: Oral   SpO2: 96%   Weight: 110.9 kg (244 lb 7.8 oz)   Height: 5' 10" (1.778 m)   PainSc: 0-No pain       Physical Exam  Vitals reviewed.   Constitutional:       General: She is not in acute distress.     Appearance: She is well-developed.   Eyes:      General: No scleral icterus.        Right eye: No discharge.         Left eye: No discharge.      Conjunctiva/sclera: Conjunctivae normal.   Cardiovascular:      Rate and Rhythm: Normal rate and regular rhythm.      Heart sounds: No murmur heard.  Pulmonary:      Effort: Pulmonary effort is normal. No respiratory distress.      Breath sounds: Normal breath sounds. No wheezing, rhonchi or rales.   Musculoskeletal:      Right lower leg: No edema.      Left lower leg: No edema.   Skin:     General: Skin is warm and dry.   Neurological:      Mental Status: She is alert and oriented to person, place, and time.   Psychiatric:         Behavior: Behavior normal.         Thought Content: Thought content normal.         Judgment: Judgment normal.         Data:  LFTs back to normal on most recent CMP home      Judi Gonsalez MD  Internal Medicine             [1]   Current Outpatient Medications on File Prior to Visit   Medication Sig Dispense Refill    AA/prot/lysine/methio/vit C/B6 (A/G PRO ORAL) Take 2 tablets by mouth once daily.       ascorbic acid, vitamin C, (VITAMIN C) 1000 MG tablet Take 1,000 mg by mouth once daily.      aspirin 81 MG Chew Take 81 mg by mouth once daily.      carvediloL (COREG) 12.5 MG tablet Take 12.5 mg by mouth 2 (two) times daily with meals.      hydroCHLOROthiazide (HYDRODIURIL) 25 MG tablet Take 25 mg by mouth once daily.      ketoconazole (NIZORAL) 2 % shampoo LATHER INTO AFFECTED AREAS. RINSE OFF IN 5-10 MIN. REPEAT 2-3 TIMES A WEEK. 120 mL " 3    multivitamin (THERAGRAN) per tablet Take 1 tablet by mouth once daily.       omega-3 fatty acids/fish oil (FISH OIL-OMEGA-3 FATTY ACIDS) 300-1,000 mg capsule Take 4 capsules by mouth once daily.      sertraline (ZOLOFT) 25 MG tablet TAKE 1 TABLET BY MOUTH EVERY DAY 90 tablet 0    SOOLANTRA 1 % Crea Apply topically.      ubidecarenone/vitamin E mixed (COQ10  ORAL) Take by mouth once daily.      UNITHROID 112 mcg tablet Take 1 tablet (112 mcg total) by mouth before breakfast. 30 tablet 11    vitamin D 1000 units Tab Take 2,000 Units by mouth once daily.       zinc gluconate 50 mg tablet Take 50 mg by mouth once daily.      [DISCONTINUED] dexAMETHasone (DECADRON) 1 MG Tab Take one tablet between 11-12 pm. Draw acth and cortisol the following morning at 8 am. (Patient not taking: Reported on 6/18/2025) 1 tablet 0     No current facility-administered medications on file prior to visit.

## 2025-06-20 LAB
COLLECT DURATION TIME UR: 24 H
CORTIS 24H UR-MRATE: 13 MCG/24 H (ref 3.5–45)
SPECIMEN VOL 24H UR: 2350 ML

## 2025-06-24 LAB
CORTIS 12 AM SAL-MCNC: <50 NG/DL
CORTIS 8 AM SAL-MCNC: 155 NG/DL (ref 100–750)

## 2025-06-26 ENCOUNTER — RESULTS FOLLOW-UP (OUTPATIENT)
Dept: ENDOCRINOLOGY | Facility: CLINIC | Age: 65
End: 2025-06-26

## 2025-07-03 ENCOUNTER — PATIENT MESSAGE (OUTPATIENT)
Dept: OPTOMETRY | Facility: CLINIC | Age: 65
End: 2025-07-03

## 2025-07-03 ENCOUNTER — OFFICE VISIT (OUTPATIENT)
Dept: OPTOMETRY | Facility: CLINIC | Age: 65
End: 2025-07-03
Payer: COMMERCIAL

## 2025-07-03 DIAGNOSIS — H43.393 VITREOUS FLOATERS, BILATERAL: Primary | ICD-10-CM

## 2025-07-03 PROCEDURE — 1159F MED LIST DOCD IN RCRD: CPT | Mod: CPTII,S$GLB,, | Performed by: OPTOMETRIST

## 2025-07-03 PROCEDURE — 92014 COMPRE OPH EXAM EST PT 1/>: CPT | Mod: S$GLB,,, | Performed by: OPTOMETRIST

## 2025-07-03 PROCEDURE — 99999 PR PBB SHADOW E&M-EST. PATIENT-LVL III: CPT | Mod: PBBFAC,,, | Performed by: OPTOMETRIST

## 2025-07-03 PROCEDURE — 1160F RVW MEDS BY RX/DR IN RCRD: CPT | Mod: CPTII,S$GLB,, | Performed by: OPTOMETRIST

## 2025-07-03 NOTE — PROGRESS NOTES
HPI    [facility] pt here for floaters OU DLS- 01/04/23    Pt complains of increased floaters OU X 2 weeks, denies vision changes and   FOL.     Last edited by Elayne Smith on 7/3/2025  9:56 AM.            Assessment /Plan     For exam results, see Encounter Report.    Vitreous floaters, bilateral      No evidence of holes, tears or detachment of retina. Negative Shafers sign in vitreous. 90 diopter lens exam negative in all quadrants. Patient educated to signs and symptoms of retinal detachment and return to clinic immediately if signs or symptoms arise.                    14-Nov-2021 04:43

## 2025-07-08 ENCOUNTER — LAB VISIT (OUTPATIENT)
Dept: LAB | Facility: HOSPITAL | Age: 65
End: 2025-07-08
Attending: PHYSICIAN ASSISTANT
Payer: COMMERCIAL

## 2025-07-08 DIAGNOSIS — E03.9 HYPOTHYROIDISM, UNSPECIFIED TYPE: ICD-10-CM

## 2025-07-08 LAB
T4 FREE SERPL-MCNC: 1.12 NG/DL (ref 0.71–1.51)
TSH SERPL-ACNC: 1.48 UIU/ML (ref 0.4–4)

## 2025-07-08 PROCEDURE — 36415 COLL VENOUS BLD VENIPUNCTURE: CPT | Mod: PO

## 2025-07-08 PROCEDURE — 84439 ASSAY OF FREE THYROXINE: CPT

## 2025-07-08 PROCEDURE — 84443 ASSAY THYROID STIM HORMONE: CPT

## 2025-07-10 DIAGNOSIS — I10 ESSENTIAL HYPERTENSION: ICD-10-CM

## 2025-07-10 RX ORDER — CARVEDILOL 25 MG/1
25 TABLET ORAL 2 TIMES DAILY WITH MEALS
Qty: 180 TABLET | Refills: 1 | OUTPATIENT
Start: 2025-07-10

## 2025-07-10 NOTE — TELEPHONE ENCOUNTER
No care due was identified.  [facility] Embedded Care Due Messages. Reference number: [id number].   7/10/2025 11:36:32 AM CDT

## 2025-07-11 NOTE — TELEPHONE ENCOUNTER
Refill Decision Note   Christin Caruso  is requesting a refill authorization.  Brief Assessment and Rationale for Refill:  Quick Discontinue     Medication Therapy Plan:  PATIENT WAS DECREASED TO 12.5MG      Comments:     Note composed:11:29 PM 07/10/2025

## 2025-07-16 ENCOUNTER — PATIENT MESSAGE (OUTPATIENT)
Dept: ENDOCRINOLOGY | Facility: CLINIC | Age: 65
End: 2025-07-16
Payer: COMMERCIAL

## 2025-07-16 DIAGNOSIS — E03.9 HYPOTHYROIDISM, UNSPECIFIED TYPE: Primary | ICD-10-CM

## 2025-07-16 RX ORDER — LEVOTHYROXINE SODIUM 125 UG/1
125 TABLET ORAL
Qty: 30 TABLET | Refills: 11 | Status: SHIPPED | OUTPATIENT
Start: 2025-07-16 | End: 2026-07-16

## 2025-08-01 ENCOUNTER — LAB VISIT (OUTPATIENT)
Dept: LAB | Facility: HOSPITAL | Age: 65
End: 2025-08-01
Attending: NURSE PRACTITIONER
Payer: COMMERCIAL

## 2025-08-01 DIAGNOSIS — R79.89 OTHER SPECIFIED ABNORMAL FINDINGS OF BLOOD CHEMISTRY: Primary | ICD-10-CM

## 2025-08-01 LAB
ALBUMIN SERPL BCP-MCNC: 3.9 G/DL (ref 3.5–5.2)
ALP SERPL-CCNC: 57 UNIT/L (ref 40–150)
ALT SERPL W/O P-5'-P-CCNC: 30 UNIT/L (ref 0–55)
ANION GAP (OHS): 10 MMOL/L (ref 8–16)
AST SERPL-CCNC: 36 UNIT/L (ref 0–50)
BILIRUB SERPL-MCNC: 1.2 MG/DL (ref 0.1–1)
BUN SERPL-MCNC: 25 MG/DL (ref 8–23)
CALCIUM SERPL-MCNC: 8.7 MG/DL (ref 8.7–10.5)
CHLORIDE SERPL-SCNC: 102 MMOL/L (ref 95–110)
CO2 SERPL-SCNC: 28 MMOL/L (ref 23–29)
CREAT SERPL-MCNC: 0.7 MG/DL (ref 0.5–1.4)
GFR SERPLBLD CREATININE-BSD FMLA CKD-EPI: >60 ML/MIN/1.73/M2
GLUCOSE SERPL-MCNC: 111 MG/DL (ref 70–110)
POTASSIUM SERPL-SCNC: 3.9 MMOL/L (ref 3.5–5.1)
PROT SERPL-MCNC: 6.6 GM/DL (ref 6–8.4)
SODIUM SERPL-SCNC: 140 MMOL/L (ref 136–145)

## 2025-08-01 PROCEDURE — 80053 COMPREHEN METABOLIC PANEL: CPT

## 2025-08-01 PROCEDURE — 36415 COLL VENOUS BLD VENIPUNCTURE: CPT | Mod: PO

## 2025-08-04 DIAGNOSIS — I10 ESSENTIAL HYPERTENSION: ICD-10-CM

## 2025-08-05 NOTE — TELEPHONE ENCOUNTER
Refill Routing Note   Medication(s) are not appropriate for processing by [facility] for the following reason(s):        No active prescription written by provider    [facility] action(s):  Defer               Appointments  past 12m or future 3m with PCP    Date Provider   Last Visit   6/18/2025 Judi Gonsalez MD   Next Visit   Visit date not found Judi Gonsalez MD   ED visits in past 90 days: 0        Note composed:1:31 PM 08/05/2025

## 2025-08-05 NOTE — TELEPHONE ENCOUNTER
No care due was identified.  [facility] Embedded Care Due Messages. Reference number: [id number].   8/04/2025 7:21:09 PM CDT

## 2025-08-06 RX ORDER — CARVEDILOL 25 MG/1
25 TABLET ORAL 2 TIMES DAILY WITH MEALS
Qty: 180 TABLET | Refills: 1 | Status: SHIPPED | OUTPATIENT
Start: 2025-08-06

## 2025-08-07 ENCOUNTER — LAB VISIT (OUTPATIENT)
Dept: LAB | Facility: HOSPITAL | Age: 65
End: 2025-08-07
Attending: PHYSICIAN ASSISTANT
Payer: COMMERCIAL

## 2025-08-07 DIAGNOSIS — E03.9 HYPOTHYROIDISM, UNSPECIFIED TYPE: ICD-10-CM

## 2025-08-07 LAB
T4 FREE SERPL-MCNC: 1.2 NG/DL (ref 0.71–1.51)
TSH SERPL-ACNC: 0.58 UIU/ML (ref 0.4–4)

## 2025-08-07 PROCEDURE — 84443 ASSAY THYROID STIM HORMONE: CPT

## 2025-08-07 PROCEDURE — 36415 COLL VENOUS BLD VENIPUNCTURE: CPT | Mod: PO

## 2025-08-07 PROCEDURE — 84439 ASSAY OF FREE THYROXINE: CPT

## (undated) DEVICE — BAG URO DRAIN

## (undated) DEVICE — SYR 10CC LUER LOCK

## (undated) DEVICE — NEPTUNE 4 PORT MANIFOLD

## (undated) DEVICE — SOL IRR NACL .9% 3000ML

## (undated) DEVICE — SPONGE GAUZE 16PLY 4X4

## (undated) DEVICE — PACK CYSTO

## (undated) DEVICE — SET TUR BLADDER IRRIG Y TYPE

## (undated) DEVICE — SEE MEDLINE ITEM 152622

## (undated) DEVICE — COVER LIGHT HANDLE 80/CA

## (undated) DEVICE — CONTAINER SPECIMEN STRL 4OZ

## (undated) DEVICE — SEE MEDLINE ITEM 157128

## (undated) DEVICE — WIRE GD LUB STD 3CM .035 150CM

## (undated) DEVICE — DRAPE C ARM 42 X 120 10/BX

## (undated) DEVICE — SEE MEDLINE ITEM 152487

## (undated) DEVICE — GOWN SURGICAL X-LARGE

## (undated) DEVICE — GUIDE WIRE MOTION .035 X 150CM

## (undated) DEVICE — GUIDEWIRE UROLOGY .038X150CM

## (undated) DEVICE — GLOVE BIOGEL ECLIPSE SZ 7.5

## (undated) DEVICE — SEE MEDLINE ITEM 154981

## (undated) DEVICE — CATH URTRL OPEN END STR TIP 5F